# Patient Record
Sex: MALE | Race: BLACK OR AFRICAN AMERICAN | Employment: OTHER | URBAN - METROPOLITAN AREA
[De-identification: names, ages, dates, MRNs, and addresses within clinical notes are randomized per-mention and may not be internally consistent; named-entity substitution may affect disease eponyms.]

---

## 2021-03-04 ENCOUNTER — OFFICE VISIT (OUTPATIENT)
Dept: FAMILY MEDICINE | Facility: CLINIC | Age: 56
End: 2021-03-04
Payer: COMMERCIAL

## 2021-03-04 VITALS
WEIGHT: 188 LBS | HEIGHT: 69 IN | TEMPERATURE: 98 F | OXYGEN SATURATION: 100 % | SYSTOLIC BLOOD PRESSURE: 134 MMHG | HEART RATE: 78 BPM | DIASTOLIC BLOOD PRESSURE: 72 MMHG | BODY MASS INDEX: 27.85 KG/M2

## 2021-03-04 DIAGNOSIS — Z86.0100 HISTORY OF COLONIC POLYPS: ICD-10-CM

## 2021-03-04 DIAGNOSIS — F17.200 NICOTINE DEPENDENCE, UNCOMPLICATED, UNSPECIFIED NICOTINE PRODUCT TYPE: Primary | ICD-10-CM

## 2021-03-04 DIAGNOSIS — N52.9 ERECTILE DYSFUNCTION, UNSPECIFIED ERECTILE DYSFUNCTION TYPE: ICD-10-CM

## 2021-03-04 DIAGNOSIS — Z12.11 SCREENING FOR COLON CANCER: ICD-10-CM

## 2021-03-04 PROCEDURE — 99203 OFFICE O/P NEW LOW 30 MIN: CPT | Performed by: FAMILY MEDICINE

## 2021-03-04 RX ORDER — VARENICLINE TARTRATE 1 MG/1
1 TABLET, FILM COATED ORAL 2 TIMES DAILY
Qty: 60 TABLET | Refills: 1 | Status: SHIPPED | OUTPATIENT
Start: 2021-04-03

## 2021-03-04 RX ORDER — SILDENAFIL CITRATE 20 MG/1
TABLET ORAL
Qty: 60 TABLET | Refills: 0 | Status: SHIPPED | OUTPATIENT
Start: 2021-03-04

## 2021-03-04 RX ORDER — VARENICLINE TARTRATE 1 MG/1
1 TABLET, FILM COATED ORAL 2 TIMES DAILY
Qty: 60 TABLET | Refills: 1 | Status: SHIPPED | OUTPATIENT
Start: 2021-04-03 | End: 2021-03-04

## 2021-03-04 SDOH — HEALTH STABILITY: MENTAL HEALTH: HOW MANY STANDARD DRINKS CONTAINING ALCOHOL DO YOU HAVE ON A TYPICAL DAY?: NOT ASKED

## 2021-03-04 SDOH — HEALTH STABILITY: MENTAL HEALTH: HOW OFTEN DO YOU HAVE A DRINK CONTAINING ALCOHOL?: 2-4 TIMES A MONTH

## 2021-03-04 SDOH — HEALTH STABILITY: MENTAL HEALTH: HOW OFTEN DO YOU HAVE 6 OR MORE DRINKS ON ONE OCCASION?: NOT ASKED

## 2021-03-04 ASSESSMENT — MIFFLIN-ST. JEOR: SCORE: 1673.14

## 2021-03-04 NOTE — PROGRESS NOTES
"    Assessment & Plan     Nicotine dependence, uncomplicated, unspecified nicotine product type  - varenicline (CHANTIX CONTINUING MONTH CHRISSY) 1 MG tablet; Take 1 tablet (1 mg) by mouth 2 times daily    Erectile dysfunction, unspecified erectile dysfunction type  - sildenafil (REVATIO) 20 MG tablet; Take 2-5 tablets ( mg) 30 minutes to 4 hours before anticipated need for erectile dysfunction.    Screening for colon cancer  - GASTROENTEROLOGY ADULT REF PROCEDURE ONLY; Future    History of colonic polyps  - GASTROENTEROLOGY ADULT REF PROCEDURE ONLY; Future       BMI:   Estimated body mass index is 27.76 kg/m  as calculated from the following:    Height as of this encounter: 1.753 m (5' 9\").    Weight as of this encounter: 85.3 kg (188 lb).     No follow-ups on file.    Ham Murphy [facility] PRIOR OTILIO Charlton is a 55 year old who presents for the following health issues     HPI       New Patient/Transfer of Care - insurance change    He had preventive visit completed in November 2020:   - PSA normal, fasting glucose 102, lipids with mildly low HDL, and negative hepatitis c test.    Smoking cessation: prescribed Chantix for smoking cessation    Erectile dysfunction: prescribed sildenafil.    He was referred for colonoscopy and had it scheduled but insurance would not cover at that location.      Review of Systems   Constitutional, HEENT, cardiovascular, pulmonary, gi and gu systems are negative, except as otherwise noted.      Objective    /72   Pulse 78   Temp 98  F (36.7  C) (Tympanic)   Ht 1.753 m (5' 9\")   Wt 85.3 kg (188 lb)   SpO2 100%   BMI 27.76 kg/m    Body mass index is 27.76 kg/m .  Physical Exam   GENERAL: healthy, alert and no distress  RESP: lungs clear to auscultation - no rales, rhonchi or wheezes  CV: regular rate and rhythm, normal S1 S2, no S3 or S4, no murmur, click or rub, no peripheral edema and peripheral pulses strong  MS: no gross " musculoskeletal defects noted, no edema  PSYCH: mentation appears normal, affect normal/bright    Results from CareEverywhere reviewed.

## 2021-03-23 DIAGNOSIS — Z11.59 ENCOUNTER FOR SCREENING FOR OTHER VIRAL DISEASES: ICD-10-CM

## 2021-04-03 ENCOUNTER — HOSPITAL ENCOUNTER (OUTPATIENT)
Dept: LAB | Facility: CLINIC | Age: 56
Discharge: HOME | End: 2021-04-03
Attending: INTERNAL MEDICINE | Admitting: INTERNAL MEDICINE
Payer: COMMERCIAL

## 2021-04-03 DIAGNOSIS — Z11.59 ENCOUNTER FOR SCREENING FOR OTHER VIRAL DISEASES: ICD-10-CM

## 2021-04-03 LAB
SARS-COV-2 RNA RESP QL NAA+PROBE: NORMAL
SPECIMEN SOURCE: NORMAL

## 2021-04-03 PROCEDURE — U0005 INFEC AGEN DETEC AMPLI PROBE: HCPCS | Performed by: INTERNAL MEDICINE

## 2021-04-03 PROCEDURE — U0003 INFECTIOUS AGENT DETECTION BY NUCLEIC ACID (DNA OR RNA); SEVERE ACUTE RESPIRATORY SYNDROME CORONAVIRUS 2 (SARS-COV-2) (CORONAVIRUS DISEASE [COVID-19]), AMPLIFIED PROBE TECHNIQUE, MAKING USE OF HIGH THROUGHPUT TECHNOLOGIES AS DESCRIBED BY CMS-2020-01-R: HCPCS | Performed by: INTERNAL MEDICINE

## 2021-04-04 LAB
LABORATORY COMMENT REPORT: NORMAL
SARS-COV-2 RNA RESP QL NAA+PROBE: NEGATIVE
SPECIMEN SOURCE: NORMAL

## 2021-04-06 ENCOUNTER — HOSPITAL ENCOUNTER (OUTPATIENT)
Facility: CLINIC | Age: 56
Discharge: HOME | End: 2021-04-06
Attending: INTERNAL MEDICINE | Admitting: INTERNAL MEDICINE
Payer: COMMERCIAL

## 2021-04-06 VITALS
HEIGHT: 68 IN | HEART RATE: 77 BPM | DIASTOLIC BLOOD PRESSURE: 70 MMHG | OXYGEN SATURATION: 96 % | RESPIRATION RATE: 16 BRPM | WEIGHT: 185 LBS | BODY MASS INDEX: 28.04 KG/M2 | SYSTOLIC BLOOD PRESSURE: 125 MMHG

## 2021-04-06 LAB — COLONOSCOPY: NORMAL

## 2021-04-06 PROCEDURE — 45378 DIAGNOSTIC COLONOSCOPY: CPT | Performed by: INTERNAL MEDICINE

## 2021-04-06 PROCEDURE — G0500 MOD SEDAT ENDO SERVICE >5YRS: HCPCS | Performed by: INTERNAL MEDICINE

## 2021-04-06 PROCEDURE — G0105 COLORECTAL SCRN; HI RISK IND: HCPCS | Performed by: INTERNAL MEDICINE

## 2021-04-06 PROCEDURE — 250N000011 HC RX IP 250 OP 636: Performed by: INTERNAL MEDICINE

## 2021-04-06 RX ORDER — FLUMAZENIL 0.1 MG/ML
0.2 INJECTION, SOLUTION INTRAVENOUS
Status: DISCONTINUED | OUTPATIENT
Start: 2021-04-06 | End: 2021-04-06 | Stop reason: HOSPADM

## 2021-04-06 RX ORDER — NALOXONE HYDROCHLORIDE 0.4 MG/ML
0.4 INJECTION, SOLUTION INTRAMUSCULAR; INTRAVENOUS; SUBCUTANEOUS
Status: DISCONTINUED | OUTPATIENT
Start: 2021-04-06 | End: 2021-04-06 | Stop reason: HOSPADM

## 2021-04-06 RX ORDER — NALOXONE HYDROCHLORIDE 0.4 MG/ML
0.2 INJECTION, SOLUTION INTRAMUSCULAR; INTRAVENOUS; SUBCUTANEOUS
Status: DISCONTINUED | OUTPATIENT
Start: 2021-04-06 | End: 2021-04-06 | Stop reason: HOSPADM

## 2021-04-06 RX ORDER — ONDANSETRON 2 MG/ML
4 INJECTION INTRAMUSCULAR; INTRAVENOUS
Status: DISCONTINUED | OUTPATIENT
Start: 2021-04-06 | End: 2021-04-06 | Stop reason: HOSPADM

## 2021-04-06 RX ORDER — ONDANSETRON 4 MG/1
4 TABLET, ORALLY DISINTEGRATING ORAL EVERY 6 HOURS PRN
Status: DISCONTINUED | OUTPATIENT
Start: 2021-04-06 | End: 2021-04-06 | Stop reason: HOSPADM

## 2021-04-06 RX ORDER — FENTANYL CITRATE 50 UG/ML
INJECTION, SOLUTION INTRAMUSCULAR; INTRAVENOUS PRN
Status: COMPLETED | OUTPATIENT
Start: 2021-04-06 | End: 2021-04-06

## 2021-04-06 RX ORDER — LIDOCAINE 40 MG/G
CREAM TOPICAL
Status: DISCONTINUED | OUTPATIENT
Start: 2021-04-06 | End: 2021-04-06 | Stop reason: HOSPADM

## 2021-04-06 RX ORDER — ONDANSETRON 2 MG/ML
4 INJECTION INTRAMUSCULAR; INTRAVENOUS EVERY 6 HOURS PRN
Status: DISCONTINUED | OUTPATIENT
Start: 2021-04-06 | End: 2021-04-06 | Stop reason: HOSPADM

## 2021-04-06 RX ORDER — PROCHLORPERAZINE MALEATE 10 MG
10 TABLET ORAL EVERY 6 HOURS PRN
Status: DISCONTINUED | OUTPATIENT
Start: 2021-04-06 | End: 2021-04-06 | Stop reason: HOSPADM

## 2021-04-06 RX ADMIN — FENTANYL CITRATE 100 MCG: 50 INJECTION, SOLUTION INTRAMUSCULAR; INTRAVENOUS at 10:55

## 2021-04-06 RX ADMIN — MIDAZOLAM 2 MG: 1 INJECTION INTRAMUSCULAR; INTRAVENOUS at 10:55

## 2021-04-06 ASSESSMENT — MIFFLIN-ST. JEOR: SCORE: 1643.65

## 2021-04-06 NOTE — H&P
Pre-Endoscopy History and Physical     Zoltan Dalton MRN# [id number]   YOB: 1965 Age: 55 year old     Date of Procedure: 2021  Primary care provider: Ham Murphy  Type of Endoscopy: Colonoscopy with possible biopsy, possible polypectomy  Reason for Procedure: screen  Type of Anesthesia Anticipated: Conscious Sedation    HPI:    Zoltan is a 55 year old male who will be undergoing the above procedure.      A history and physical has been performed. The patient's medications and allergies have been reviewed. The risks and benefits of the procedure and the sedation options and risks were discussed with the patient.  All questions were answered and informed consent was obtained.      He denies a personal or family history of anesthesia complications or bleeding disorders.     Patient Active Problem List   Diagnosis     Erectile dysfunction     Tobacco abuse        History reviewed. No pertinent past medical history.     Past Surgical History:   Procedure Laterality Date     COLONOSCOPY         Social History     Tobacco Use     Smoking status: Former Smoker     Packs/day: 1.00     Years: 38.00     Pack years: 38.00     Types: Cigarettes     Start date:      Quit date: 2020     Years since quittin.2     Smokeless tobacco: Never Used   Substance Use Topics     Alcohol use: Yes     Frequency: 2-4 times a month     Comment: 6 per week       Family History   Problem Relation Age of Onset     Colon Cancer No family hx of        Prior to Admission medications    Medication Sig Start Date End Date Taking? Authorizing Provider   sildenafil (REVATIO) 20 MG tablet Take 2-5 tablets ( mg) 30 minutes to 4 hours before anticipated need for erectile dysfunction. 3/4/21  Yes Ham Murphy DO   varenicline (CHANTIX CONTINUING MONTH CHRISSY) 1 MG tablet Take 1 tablet (1 mg) by mouth 2 times daily 4/3/21  Yes Ham Murphy, DO       No Known Allergies     REVIEW OF SYSTEMS:   5 point ROS  "negative except as noted above in HPI, including Gen., Resp., CV, GI &  system review.    PHYSICAL EXAM:   /75   Pulse 82   Resp 12   Ht 1.727 m (5' 8\")   Wt 83.9 kg (185 lb)   SpO2 98%   BMI 28.13 kg/m   Estimated body mass index is 28.13 kg/m  as calculated from the following:    Height as of this encounter: 1.727 m (5' 8\").    Weight as of this encounter: 83.9 kg (185 lb).   GENERAL APPEARANCE: alert, and oriented  MENTAL STATUS: alert  AIRWAY EXAM: Mallampatti Class I (visualization of the soft palate, fauces, uvula, anterior and posterior pillars)  RESP: lungs clear to auscultation - no rales, rhonchi or wheezes  CV: regular rates and rhythm  DIAGNOSTICS:    Not indicated    IMPRESSION   ASA Class 2 - Mild systemic disease    PLAN:   Plan for Colonoscopy with possible biopsy, possible polypectomy. We discussed the risks, benefits and alternatives and the patient wished to proceed.    The above has been forwarded to the consulting provider.      Signed Electronically by: Ambrose Vargas MD  April 6, 2021          "

## 2021-04-06 NOTE — LETTER
March 22, 2021      Zoltan Dalton  [address] [address]        Dear Zoltan,      Please be aware that coverage of these services is subject to the terms and limitations of your health insurance plan.  Call member services at your health plan with any benefit or coverage questions.    Thank you for choosing [facility]. You are scheduled for the following service(s):    Date:  4/6/2021             Procedure:  COLONOSCOPY  Doctor:           Arrival Time:  10:30 am *Enter and check in at the Main Hospital Entrance*  Procedure Time:  11:00 am      Location:   [facility]        Endoscopy Department, First Floor         [address] [place], [place] [address]      [phone] or [phone] ([facility]) to reschedule        MIRALAX -GATORADE  PREP  Colonoscopy is the most accurate test to detect colon polyps and colon cancer; and the only test where polyps can be removed. During this procedure, a doctor examines the lining of your large intestine and rectum through a flexible tube.   Transportation  You must arrange for a ride for the day of your procedure with a responsible adult. A taxi , Uber, etc, is not an option unless you are accompanied by a responsible adult. If you fail to arrange transportation with a responsible adult, your procedure will be cancelled and rescheduled.    Purchase the  following supplies at your local pharmacy:  - 2 (two) bisacodyl tablets: each tablet contains 5 mg.  (Dulcolax  laxative NOT Dulcolax  stool softener)   - 1 (one) 8.3 oz bottle of Polyethylene Glycol (PEG) 3350 Powder   (MiraLAX , Smooth LAX , ClearLAX  or equivalent)  - 64 oz Gatorade    Regular Gatorade, Gatorade G2 , Powerade , Powerade Zero  or Pedialyte  is acceptable. Red colored flavors are not allowed; all other colors (yellow, green, orange, purple and blue) are okay. It is also okay to buy two 2.12 oz packets of powdered Gatorade that  can be mixed with water to a total volume of 64 oz of liquid.  - 1 (one) 10 oz bottle of Magnesium Citrate (Red colored flavors are not allowed)  It is also okay for you to use a 0.5 oz package of powdered magnesium citrate (17 g) mixed with 10 oz of water.      PREPARATION FOR COLONOSCOPY    7 days before:    Discontinue fiber supplements and medications containing iron. This includes Metamucil  and Fibercon ; and multivitamins with iron.    3 days before:    Begin a low-fiber diet. A low-fiber diet helps making the cleanout more effective.     Examples of a low-fiber diet include (but are not limited to): white bread, white rice, pasta, crackers, fish, chicken, eggs, ground beef, creamy peanut butter, cooked/steamed/boiled vegetables, canned fruit, bananas, melons, milk, plain yogurt cheese, salad dressing and other condiments.     The following are not allowed on a low-fiber diet: seeds, nuts, popcorn, bran, whole wheat, corn, quinoa, raw fruits and vegetables, berries and dried fruit, beans and lentils.    For additional details on low-fiber diet, please refer to the table on the last page.    2 days before:    Continue the low-fiber diet.     Drink at least 8 glasses of water throughout the day.     Stop eating solid foods at 11:45 pm.    1 day before:    In the morning: begin a clear liquid diet (liquids you can see through).     Examples of a clear liquid diet include: water, clear broth or bouillon, Gatorade, Pedialyte or Powerade, carbonated and non-carbonated soft drinks (Sprite , 7-Up , ginger ale), strained fruit juices without pulp (apple, white grape, white cranberry), Jell-O  and popsicles.     The following are not allowed on a clear liquid diet: red liquids, alcoholic beverages, dairy products (milk, creamer, and yogurt), protein shakes, creamy broths, juice with pulp and chewing tobacco.    At noon: take 2 (two) bisacodyl tablets     At 4 (and no later than 6pm): start drinking the Miralax-Gatorade  preparation (8.3 oz of Miralax mixed with 64 oz of Gatorade in a large pitcher). Drink 1(one) 8 oz glass every 15 minutes thereafter, until the mixture is gone.    COLON CLEANSING TIPS: drink adequate amounts of fluids before and after your colon cleansing to prevent dehydration. Stay near a toilet because you will have diarrhea. Even if you are sitting on the toilet, continue to drink the cleansing solution every 15 minutes. If you feel nauseous or vomit, rinse your mouth with water, take a 15 to 30-minute-break and then continue drinking the solution. You will be uncomfortable until the stool has flushed from your colon (in about 2 to 4 hours). You may feel chilled.    Day of your procedure  You may take all of your morning medications including blood pressure medications, blood thinners (if you have not been instructed to stop these by our office), methadone, anti-seizure medications with sips of water 3 hours prior to your procedure or earlier. Do not take insulin or vitamins prior to your procedure. Continue the clear liquid diet.       4 hours prior: drink 10 oz of magnesium citrate. It may be easier to drink it with a straw.    STOP consuming all liquids after that.     Do not take anything by mouth during this time.     Allow extra time to travel to your procedure as you may need to stop and use a restroom along the way.    You are ready for the procedure, if you followed all instructions and your stool is no longer formed, but clear or yellow liquid. If you are unsure whether your colon is clean, please call our office at [phone] before you leave for your appointment.    Bring the following to your procedure:  - Insurance Card/Photo ID.   - List of current medications including over-the-counter medications and supplements.   - Your rescue inhaler if you currently use one to control asthma.    Canceling or rescheduling your appointment:   If you must cancel or reschedule your appointment, please call  [phone] as soon as possible.      COLONOSCOPY PRE-PROCEDURE CHECKLIST    If you have diabetes, ask your regular doctor for diet and medication restrictions.  If you take an anticoagulant or anti-platelet medication (such as Coumadin , Lovenox , Pradaxa , Xarelto , Eliquis , etc.), please call your primary doctor for advice on holding this medication.  If you take aspirin you may continue to do so.  If you are or may be pregnant, please discuss the risks and benefits of this procedure with your doctor.        What happens during a colonoscopy?    Plan to spend up to two hours, starting at registration time, at the endoscopy center the day of your procedure. The colonoscopy takes an average of 15 to 30 minutes. Recovery time is about 30 minutes.      Before the exam:    You will change into a gown.    Your medical history and medication list will be reviewed with you, unless that has been done over the phone prior to the procedure.     A nurse will insert an intravenous (IV) line into your hand or arm.    The doctor will meet with you and will give you a consent form to sign.  During the exam:     Medicine will be given through the IV line to help you relax.     Your heart rate and oxygen levels will be monitored. If your blood pressure is low, you may be given fluids through the IV line.     The doctor will insert a flexible hollow tube, called a colonoscope, into your rectum. The scope will be advanced slowly through the large intestine (colon).    You may have a feeling of fullness or pressure.     If an abnormal tissue or a polyp is found, the doctor may remove it through the endoscope for closer examination, or biopsy. Tissue removal is painless    After the exam:           Any tissue samples removed during the exam will be sent to a lab for evaluation. It may take 5-7 working days for you to be notified of the results.     A nurse will provide you with complete discharge instructions before you leave the  endoscopy center. Be sure to ask the nurse for specific instructions if you take blood thinners such as Aspirin, Coumadin or Plavix.     The doctor will prepare a full report for you and for the physician who referred you for the procedure.     Your doctor will talk with you about the initial results of your exam.      Medication given during the exam will prohibit you from driving for the rest of the day.     Following the exam, you may resume your normal diet. Your first meal should be light, no greasy foods. Avoid alcohol until the next day.     You may resume your regular activities the day after the procedure.         LOW-FIBER DIET    Foods RECOMMENDED Foods to AVOID   Breads, Cereal, Rice and Pasta:   White bread, rolls, biscuits, croissant and fiordaliza toast.   Waffles, Swedish toast and pancakes.   White rice, noodles, pasta, macaroni and peeled cooked potatoes.   Plain crackers and saltines.   Cooked cereals: farina, cream of rice.   Cold cereals: Puffed Rice , Rice Krispies , Corn Flakes  and Special K    Breads, Cereal, Rice and Pasta:   Breads or rolls with nuts, seeds or fruit.   Whole wheat, pumpernickel, rye breads and cornbread.   Potatoes with skin, brown or wild rice, and kasha (buckwheat).     Vegetables:   Tender cooked and canned vegetables without seeds: carrots, asparagus tips, green or wax beans, pumpkin, spinach, lima beans. Vegetables:   Raw or steamed vegetables.   Vegetables with seeds.   Sauerkraut.   Winter squash, peas, broccoli, Brussel sprouts, cabbage, onions, cauliflower, baked beans, peas and corn.   Fruits:   Strained fruit juice.   Canned fruit, except pineapple.   Ripe bananas and melon. Fruits:   Prunes and prune juice.   Raw fruits.   Dried fruits: figs, dates and raisins.   Milk/Dairy:   Milk: plain or flavored.   Yogurt, custard and ice cream.   Cheese and cottage cheese Milk/Dairy:     Meat and other proteins:   ground, well-cooked tender beef, lamb, ham, veal, pork, fish,  poultry and organ meats.   Eggs.   Peanut butter without nuts. Meat and other proteins:   Tough, fibrous meats with gristle.   Dry beans, peas and lentils.   Peanut butter with nuts.   Tofu.   Fats, Snack, Sweets, Condiments and Beverages:   Margarine, butter, oils, mayonnaise, sour cream and salad dressing, plain gravy.   Sugar, hard candy, clear jelly, honey and syrup.   Spices, cooked herbs, bouillon, broth and soups made with allowed vegetable, ketchup and mustard.   Coffee, tea and carbonated drinks.   Plain cakes, cookies and pretzels.   Gelatin, plain puddings, custard, ice cream, sherbet and popsicles. Fats, Snack, Sweets, Condiments and Beverages:   Nuts, seeds and coconut.   Jam, marmalade and preserves.   Pickles, olives, relish and horseradish.   All desserts containing nuts, seeds, dried fruit and coconut; or made from whole grains or bran.   Candy made with nuts or seeds.   Popcorn.         DIRECTIONS TO THE ENDOSCOPY DEPARTMENT    From the north ([facility])  Take [address] South, exit on [address] [address]. Get into the left hand hawa, turn left (east), go one-half mile to [address] and turn left. Go north to the second stoplight, take a right on [address] and follow it to the Main Hospital entrance.    From the south ([facility])  Take 35N to the [address]E split and exit on [address] [address]. On [address] [address], turn left (west) to [address]. Turn right (north) on [address]. Go north to the second stoplight, take a right on [address] and follow it to the Main Hospital entrance.    From the east via 35E ([facility])  Take [address] south to [address] [address] exit. Turn right on [address] [address]. Go west to [address]. Turn right (north) on [address]. Go to the second stoplight, take a right on [address] to the Main Hospital entrance.    From the east via [address] ([facility])  Take [address] to [address]. Turn left  (south) on [address] to [address], turn left (east) on [address] and follow it to the Main Hospital entrance.    From the west via [address] ([place], [place])  Take [address] east to [address]. Turn right (south) on [address] to [address], turn left (east) on [address] and follow it to the Main Hospital entrance.

## 2025-01-01 ENCOUNTER — APPOINTMENT (OUTPATIENT)
Dept: ULTRASOUND IMAGING | Facility: CLINIC | Age: 60
DRG: 420 | End: 2025-01-01
Attending: STUDENT IN AN ORGANIZED HEALTH CARE EDUCATION/TRAINING PROGRAM

## 2025-01-01 ENCOUNTER — APPOINTMENT (OUTPATIENT)
Dept: ULTRASOUND IMAGING | Facility: CLINIC | Age: 60
DRG: 420 | End: 2025-01-01

## 2025-01-01 ENCOUNTER — APPOINTMENT (OUTPATIENT)
Dept: ULTRASOUND IMAGING | Facility: CLINIC | Age: 60
DRG: 420 | End: 2025-01-01
Attending: EMERGENCY MEDICINE

## 2025-01-01 ENCOUNTER — APPOINTMENT (OUTPATIENT)
Dept: CT IMAGING | Facility: CLINIC | Age: 60
End: 2025-01-01
Attending: STUDENT IN AN ORGANIZED HEALTH CARE EDUCATION/TRAINING PROGRAM

## 2025-01-01 ENCOUNTER — APPOINTMENT (OUTPATIENT)
Dept: PHYSICAL THERAPY | Facility: CLINIC | Age: 60
DRG: 420 | End: 2025-01-01
Attending: STUDENT IN AN ORGANIZED HEALTH CARE EDUCATION/TRAINING PROGRAM

## 2025-01-01 ENCOUNTER — APPOINTMENT (OUTPATIENT)
Dept: CT IMAGING | Facility: CLINIC | Age: 60
DRG: 420 | End: 2025-01-01
Attending: EMERGENCY MEDICINE

## 2025-01-01 ENCOUNTER — DOCUMENTATION ONLY (OUTPATIENT)
Dept: OTHER | Facility: CLINIC | Age: 60
End: 2025-01-01

## 2025-01-01 ENCOUNTER — APPOINTMENT (OUTPATIENT)
Dept: MRI IMAGING | Facility: CLINIC | Age: 60
DRG: 420 | End: 2025-01-01

## 2025-01-01 ENCOUNTER — APPOINTMENT (OUTPATIENT)
Dept: GENERAL RADIOLOGY | Facility: CLINIC | Age: 60
DRG: 420 | End: 2025-01-01
Attending: STUDENT IN AN ORGANIZED HEALTH CARE EDUCATION/TRAINING PROGRAM

## 2025-01-01 PROCEDURE — 74183 MRI ABD W/O CNTR FLWD CNTR: CPT

## 2025-01-01 PROCEDURE — 76705 ECHO EXAM OF ABDOMEN: CPT

## 2025-01-01 PROCEDURE — 71250 CT THORAX DX C-: CPT

## 2025-01-01 PROCEDURE — 49083 ABD PARACENTESIS W/IMAGING: CPT | Mod: 52

## 2025-01-01 PROCEDURE — 93971 EXTREMITY STUDY: CPT | Mod: LT

## 2025-01-01 PROCEDURE — 74330 X-RAY BILE/PANC ENDOSCOPY: CPT

## 2025-06-24 ENCOUNTER — HOSPITAL ENCOUNTER (INPATIENT)
Facility: CLINIC | Age: 60
End: 2025-06-24
Attending: EMERGENCY MEDICINE | Admitting: STUDENT IN AN ORGANIZED HEALTH CARE EDUCATION/TRAINING PROGRAM

## 2025-06-24 DIAGNOSIS — R74.01 TRANSAMINITIS: ICD-10-CM

## 2025-06-24 DIAGNOSIS — R53.83 OTHER FATIGUE: ICD-10-CM

## 2025-06-24 DIAGNOSIS — K63.89 COLONIC MASS: ICD-10-CM

## 2025-06-24 DIAGNOSIS — R91.8 PULMONARY NODULES: ICD-10-CM

## 2025-06-24 DIAGNOSIS — R16.0 LIVER MASS: ICD-10-CM

## 2025-06-24 DIAGNOSIS — R63.4 UNEXPLAINED WEIGHT LOSS: ICD-10-CM

## 2025-06-24 DIAGNOSIS — R17 ELEVATED BILIRUBIN: ICD-10-CM

## 2025-06-24 DIAGNOSIS — N17.9 AKI (ACUTE KIDNEY INJURY): ICD-10-CM

## 2025-06-24 LAB
AFP SERPL-MCNC: <1.8 NG/ML
ALBUMIN SERPL BCG-MCNC: 3 G/DL (ref 3.5–5.2)
ALBUMIN UR-MCNC: 10 MG/DL
ALP SERPL-CCNC: 489 U/L (ref 40–150)
ALT SERPL W P-5'-P-CCNC: 140 U/L (ref 0–70)
ANION GAP SERPL CALCULATED.3IONS-SCNC: 13 MMOL/L (ref 7–15)
APPEARANCE UR: ABNORMAL
APTT PPP: 26 SECONDS (ref 22–38)
AST SERPL W P-5'-P-CCNC: 135 U/L (ref 0–45)
BASOPHILS # BLD AUTO: 0 10E3/UL (ref 0–0.2)
BASOPHILS NFR BLD AUTO: 0 %
BILIRUB SERPL-MCNC: 33.4 MG/DL
BILIRUB UR QL STRIP: ABNORMAL
BUN SERPL-MCNC: 52.3 MG/DL (ref 8–23)
CALCIUM SERPL-MCNC: 12 MG/DL (ref 8.8–10.4)
CEA SERPL-MCNC: 43.1 NG/ML
CELLULAR CAST: 43 /LPF (ref ?–1)
CHLORIDE SERPL-SCNC: 97 MMOL/L (ref 98–107)
CK SERPL-CCNC: 474 U/L (ref 39–308)
COLOR UR AUTO: ABNORMAL
CREAT SERPL-MCNC: 2.24 MG/DL (ref 0.67–1.17)
EGFRCR SERPLBLD CKD-EPI 2021: 33 ML/MIN/1.73M2
EOSINOPHIL # BLD AUTO: 0 10E3/UL (ref 0–0.7)
EOSINOPHIL NFR BLD AUTO: 0 %
ERYTHROCYTE [DISTWIDTH] IN BLOOD BY AUTOMATED COUNT: 23.4 % (ref 10–15)
EST. AVERAGE GLUCOSE BLD GHB EST-MCNC: 77 MG/DL
FLUAV RNA SPEC QL NAA+PROBE: NEGATIVE
FLUBV RNA RESP QL NAA+PROBE: NEGATIVE
GLUCOSE SERPL-MCNC: 107 MG/DL (ref 70–99)
GLUCOSE UR STRIP-MCNC: NEGATIVE MG/DL
GRANULAR CAST: 45 /LPF (ref ?–1)
HBA1C MFR BLD: 4.3 %
HCO3 SERPL-SCNC: 24 MMOL/L (ref 22–29)
HCT VFR BLD AUTO: 30.7 % (ref 40–53)
HGB BLD-MCNC: 11.3 G/DL (ref 13.3–17.7)
HGB UR QL STRIP: ABNORMAL
HOLD SPECIMEN: NORMAL
HYALINE CASTS: 68 /LPF
IMM GRANULOCYTES # BLD: 0.2 10E3/UL
IMM GRANULOCYTES NFR BLD: 1 %
INR PPP: 1.09 (ref 0.85–1.15)
KETONES UR STRIP-MCNC: ABNORMAL MG/DL
LACTATE SERPL-SCNC: 1 MMOL/L (ref 0.7–2)
LEUKOCYTE ESTERASE UR QL STRIP: NEGATIVE
LIPASE SERPL-CCNC: 31 U/L (ref 13–60)
LYMPHOCYTES # BLD AUTO: 1.5 10E3/UL (ref 0.8–5.3)
LYMPHOCYTES NFR BLD AUTO: 10 %
MCH RBC QN AUTO: 33.5 PG (ref 26.5–33)
MCHC RBC AUTO-ENTMCNC: 36.8 G/DL (ref 31.5–36.5)
MCV RBC AUTO: 91 FL (ref 78–100)
MONOCYTES # BLD AUTO: 1.1 10E3/UL (ref 0–1.3)
MONOCYTES NFR BLD AUTO: 8 %
MUCOUS THREADS #/AREA URNS LPF: PRESENT /LPF
NEUTROPHILS # BLD AUTO: 11.7 10E3/UL (ref 1.6–8.3)
NEUTROPHILS NFR BLD AUTO: 81 %
NITRATE UR QL: NEGATIVE
NRBC # BLD AUTO: 0 10E3/UL
NRBC BLD AUTO-RTO: 0 /100
PH UR STRIP: 5.5 [PH] (ref 5–7)
PLATELET # BLD AUTO: 277 10E3/UL (ref 150–450)
POTASSIUM SERPL-SCNC: 3.6 MMOL/L (ref 3.4–5.3)
PROT SERPL-MCNC: 5.9 G/DL (ref 6.4–8.3)
PROTHROMBIN TIME: 14.2 SECONDS (ref 11.8–14.8)
RBC # BLD AUTO: 3.37 10E6/UL (ref 4.4–5.9)
RBC URINE: 3 /HPF
RSV RNA SPEC NAA+PROBE: NEGATIVE
SARS-COV-2 RNA RESP QL NAA+PROBE: NEGATIVE
SODIUM SERPL-SCNC: 134 MMOL/L (ref 135–145)
SP GR UR STRIP: 1.01 (ref 1–1.03)
SQUAMOUS EPITHELIAL: 5 /HPF
TSH SERPL DL<=0.005 MIU/L-ACNC: NORMAL M[IU]/L
UROBILINOGEN UR STRIP-MCNC: NORMAL MG/DL
WBC # BLD AUTO: 14.5 10E3/UL (ref 4–11)
WBC URINE: 17 /HPF

## 2025-06-24 PROCEDURE — 85610 PROTHROMBIN TIME: CPT | Performed by: NURSE PRACTITIONER

## 2025-06-24 PROCEDURE — 82105 ALPHA-FETOPROTEIN SERUM: CPT | Performed by: NURSE PRACTITIONER

## 2025-06-24 PROCEDURE — 258N000003 HC RX IP 258 OP 636

## 2025-06-24 PROCEDURE — 87637 SARSCOV2&INF A&B&RSV AMP PRB: CPT | Performed by: NURSE PRACTITIONER

## 2025-06-24 PROCEDURE — 85025 COMPLETE CBC W/AUTO DIFF WBC: CPT | Performed by: EMERGENCY MEDICINE

## 2025-06-24 PROCEDURE — 36415 COLL VENOUS BLD VENIPUNCTURE: CPT | Performed by: STUDENT IN AN ORGANIZED HEALTH CARE EDUCATION/TRAINING PROGRAM

## 2025-06-24 PROCEDURE — 82040 ASSAY OF SERUM ALBUMIN: CPT | Performed by: EMERGENCY MEDICINE

## 2025-06-24 PROCEDURE — 81001 URINALYSIS AUTO W/SCOPE: CPT | Performed by: EMERGENCY MEDICINE

## 2025-06-24 PROCEDURE — 99223 1ST HOSP IP/OBS HIGH 75: CPT | Performed by: NURSE PRACTITIONER

## 2025-06-24 PROCEDURE — 36415 COLL VENOUS BLD VENIPUNCTURE: CPT

## 2025-06-24 PROCEDURE — 36415 COLL VENOUS BLD VENIPUNCTURE: CPT | Performed by: NURSE PRACTITIONER

## 2025-06-24 PROCEDURE — 85730 THROMBOPLASTIN TIME PARTIAL: CPT | Performed by: NURSE PRACTITIONER

## 2025-06-24 PROCEDURE — 250N000011 HC RX IP 250 OP 636

## 2025-06-24 PROCEDURE — 83690 ASSAY OF LIPASE: CPT | Performed by: EMERGENCY MEDICINE

## 2025-06-24 PROCEDURE — 258N000003 HC RX IP 258 OP 636: Performed by: EMERGENCY MEDICINE

## 2025-06-24 PROCEDURE — 258N000003 HC RX IP 258 OP 636: Performed by: NURSE PRACTITIONER

## 2025-06-24 PROCEDURE — 250N000013 HC RX MED GY IP 250 OP 250 PS 637

## 2025-06-24 PROCEDURE — 36415 COLL VENOUS BLD VENIPUNCTURE: CPT | Performed by: EMERGENCY MEDICINE

## 2025-06-24 PROCEDURE — 99418 PROLNG IP/OBS E/M EA 15 MIN: CPT | Performed by: NURSE PRACTITIONER

## 2025-06-24 PROCEDURE — 83605 ASSAY OF LACTIC ACID: CPT | Performed by: STUDENT IN AN ORGANIZED HEALTH CARE EDUCATION/TRAINING PROGRAM

## 2025-06-24 PROCEDURE — 99285 EMERGENCY DEPT VISIT HI MDM: CPT | Mod: 25

## 2025-06-24 PROCEDURE — 250N000013 HC RX MED GY IP 250 OP 250 PS 637: Performed by: NURSE PRACTITIONER

## 2025-06-24 PROCEDURE — 83036 HEMOGLOBIN GLYCOSYLATED A1C: CPT | Performed by: NURSE PRACTITIONER

## 2025-06-24 PROCEDURE — 84443 ASSAY THYROID STIM HORMONE: CPT | Performed by: NURSE PRACTITIONER

## 2025-06-24 PROCEDURE — 87086 URINE CULTURE/COLONY COUNT: CPT | Performed by: EMERGENCY MEDICINE

## 2025-06-24 PROCEDURE — 82378 CARCINOEMBRYONIC ANTIGEN: CPT | Performed by: NURSE PRACTITIONER

## 2025-06-24 PROCEDURE — 96360 HYDRATION IV INFUSION INIT: CPT

## 2025-06-24 PROCEDURE — 83735 ASSAY OF MAGNESIUM: CPT | Performed by: STUDENT IN AN ORGANIZED HEALTH CARE EDUCATION/TRAINING PROGRAM

## 2025-06-24 PROCEDURE — 86301 IMMUNOASSAY TUMOR CA 19-9: CPT | Performed by: NURSE PRACTITIONER

## 2025-06-24 PROCEDURE — 82550 ASSAY OF CK (CPK): CPT | Performed by: EMERGENCY MEDICINE

## 2025-06-24 PROCEDURE — 120N000001 HC R&B MED SURG/OB

## 2025-06-24 RX ORDER — PROCHLORPERAZINE MALEATE 5 MG/1
10 TABLET ORAL EVERY 6 HOURS PRN
Status: DISCONTINUED | OUTPATIENT
Start: 2025-06-24 | End: 2025-07-10 | Stop reason: HOSPADM

## 2025-06-24 RX ORDER — ONDANSETRON 2 MG/ML
4 INJECTION INTRAMUSCULAR; INTRAVENOUS EVERY 6 HOURS PRN
Status: DISCONTINUED | OUTPATIENT
Start: 2025-06-24 | End: 2025-07-10 | Stop reason: HOSPADM

## 2025-06-24 RX ORDER — AMOXICILLIN 250 MG
1 CAPSULE ORAL 2 TIMES DAILY
Status: DISCONTINUED | OUTPATIENT
Start: 2025-06-24 | End: 2025-07-10 | Stop reason: HOSPADM

## 2025-06-24 RX ORDER — ALBUMIN (HUMAN) 12.5 G/50ML
50 SOLUTION INTRAVENOUS ONCE
Status: DISCONTINUED | OUTPATIENT
Start: 2025-06-24 | End: 2025-06-29

## 2025-06-24 RX ORDER — HYDROMORPHONE HCL IN WATER/PF 6 MG/30 ML
0.4 PATIENT CONTROLLED ANALGESIA SYRINGE INTRAVENOUS
Status: DISCONTINUED | OUTPATIENT
Start: 2025-06-24 | End: 2025-07-10 | Stop reason: HOSPADM

## 2025-06-24 RX ORDER — HYDROMORPHONE HCL IN WATER/PF 6 MG/30 ML
0.2 PATIENT CONTROLLED ANALGESIA SYRINGE INTRAVENOUS
Status: DISCONTINUED | OUTPATIENT
Start: 2025-06-24 | End: 2025-07-10 | Stop reason: HOSPADM

## 2025-06-24 RX ORDER — HYDROMORPHONE HYDROCHLORIDE 2 MG/1
2 TABLET ORAL EVERY 4 HOURS PRN
Status: DISCONTINUED | OUTPATIENT
Start: 2025-06-24 | End: 2025-07-09

## 2025-06-24 RX ORDER — ALBUTEROL SULFATE 0.83 MG/ML
2.5 SOLUTION RESPIRATORY (INHALATION) EVERY 4 HOURS PRN
Status: DISCONTINUED | OUTPATIENT
Start: 2025-06-24 | End: 2025-07-10 | Stop reason: HOSPADM

## 2025-06-24 RX ORDER — SODIUM CHLORIDE, SODIUM LACTATE, POTASSIUM CHLORIDE, CALCIUM CHLORIDE 600; 310; 30; 20 MG/100ML; MG/100ML; MG/100ML; MG/100ML
INJECTION, SOLUTION INTRAVENOUS CONTINUOUS
Status: DISCONTINUED | OUTPATIENT
Start: 2025-06-24 | End: 2025-06-28

## 2025-06-24 RX ORDER — ACETAMINOPHEN 650 MG/1
650 SUPPOSITORY RECTAL EVERY 4 HOURS PRN
Status: DISCONTINUED | OUTPATIENT
Start: 2025-06-24 | End: 2025-07-10 | Stop reason: HOSPADM

## 2025-06-24 RX ORDER — POLYETHYLENE GLYCOL 3350 17 G/17G
17 POWDER, FOR SOLUTION ORAL 2 TIMES DAILY PRN
Status: DISCONTINUED | OUTPATIENT
Start: 2025-06-24 | End: 2025-07-10 | Stop reason: HOSPADM

## 2025-06-24 RX ORDER — LIDOCAINE 40 MG/G
CREAM TOPICAL
Status: DISCONTINUED | OUTPATIENT
Start: 2025-06-24 | End: 2025-07-10 | Stop reason: HOSPADM

## 2025-06-24 RX ORDER — CALCIUM CARBONATE 500 MG/1
1000 TABLET, CHEWABLE ORAL 4 TIMES DAILY PRN
Status: DISCONTINUED | OUTPATIENT
Start: 2025-06-24 | End: 2025-07-10 | Stop reason: HOSPADM

## 2025-06-24 RX ORDER — AMOXICILLIN 250 MG
2 CAPSULE ORAL 2 TIMES DAILY
Status: DISCONTINUED | OUTPATIENT
Start: 2025-06-24 | End: 2025-07-10 | Stop reason: HOSPADM

## 2025-06-24 RX ORDER — HYDRALAZINE HYDROCHLORIDE 20 MG/ML
10 INJECTION INTRAMUSCULAR; INTRAVENOUS EVERY 4 HOURS PRN
Status: DISCONTINUED | OUTPATIENT
Start: 2025-06-24 | End: 2025-07-09

## 2025-06-24 RX ORDER — ONDANSETRON 4 MG/1
4 TABLET, ORALLY DISINTEGRATING ORAL EVERY 6 HOURS PRN
Status: DISCONTINUED | OUTPATIENT
Start: 2025-06-24 | End: 2025-07-10 | Stop reason: HOSPADM

## 2025-06-24 RX ORDER — HYDRALAZINE HYDROCHLORIDE 10 MG/1
10 TABLET, FILM COATED ORAL EVERY 4 HOURS PRN
Status: DISCONTINUED | OUTPATIENT
Start: 2025-06-24 | End: 2025-07-09

## 2025-06-24 RX ORDER — LIDOCAINE 40 MG/G
CREAM TOPICAL
Status: DISCONTINUED | OUTPATIENT
Start: 2025-06-24 | End: 2025-06-26 | Stop reason: HOSPADM

## 2025-06-24 RX ORDER — ACETAMINOPHEN 325 MG/1
650 TABLET ORAL EVERY 4 HOURS PRN
Status: DISCONTINUED | OUTPATIENT
Start: 2025-06-24 | End: 2025-07-10 | Stop reason: HOSPADM

## 2025-06-24 RX ADMIN — SODIUM CHLORIDE, SODIUM LACTATE, POTASSIUM CHLORIDE, AND CALCIUM CHLORIDE: .6; .31; .03; .02 INJECTION, SOLUTION INTRAVENOUS at 23:45

## 2025-06-24 RX ADMIN — SODIUM CHLORIDE 1000 ML: 0.9 INJECTION, SOLUTION INTRAVENOUS at 08:34

## 2025-06-24 RX ADMIN — PHYTONADIONE 5 MG: 10 INJECTION, EMULSION INTRAMUSCULAR; INTRAVENOUS; SUBCUTANEOUS at 19:33

## 2025-06-24 RX ADMIN — SODIUM CHLORIDE, SODIUM LACTATE, POTASSIUM CHLORIDE, AND CALCIUM CHLORIDE: .6; .31; .03; .02 INJECTION, SOLUTION INTRAVENOUS at 14:50

## 2025-06-24 RX ADMIN — SENNOSIDES AND DOCUSATE SODIUM 2 TABLET: 50; 8.6 TABLET ORAL at 20:21

## 2025-06-24 RX ADMIN — POLYETHYLENE GLYCOL 3350, SODIUM SULFATE ANHYDROUS, SODIUM BICARBONATE, SODIUM CHLORIDE, POTASSIUM CHLORIDE 4000 ML: 236; 22.74; 6.74; 5.86; 2.97 POWDER, FOR SOLUTION ORAL at 16:47

## 2025-06-24 ASSESSMENT — ACTIVITIES OF DAILY LIVING (ADL)
ADLS_ACUITY_SCORE: 28
ADLS_ACUITY_SCORE: 41
ADLS_ACUITY_SCORE: 28
ADLS_ACUITY_SCORE: 41
WEAR_GLASSES_OR_BLIND: NO
EATING/SWALLOWING: SWALLOWING SOLID FOOD
DIFFICULTY_COMMUNICATING: NO
CONCENTRATING,_REMEMBERING_OR_MAKING_DECISIONS_DIFFICULTY: NO
WALKING_OR_CLIMBING_STAIRS_DIFFICULTY: YES
ADLS_ACUITY_SCORE: 41
ADLS_ACUITY_SCORE: 28
ADLS_ACUITY_SCORE: 28
DRESSING/BATHING_DIFFICULTY: NO
TOILETING_ISSUES: NO
FALL_HISTORY_WITHIN_LAST_SIX_MONTHS: NO
ADLS_ACUITY_SCORE: 41
ADLS_ACUITY_SCORE: 28
WALKING_OR_CLIMBING_STAIRS: AMBULATION DIFFICULTY, REQUIRES EQUIPMENT
ADLS_ACUITY_SCORE: 41
HEARING_DIFFICULTY_OR_DEAF: NO
DOING_ERRANDS_INDEPENDENTLY_DIFFICULTY: NO
ADLS_ACUITY_SCORE: 41
ADLS_ACUITY_SCORE: 46
ADLS_ACUITY_SCORE: 46
ADLS_ACUITY_SCORE: 28
CHANGE_IN_FUNCTIONAL_STATUS_SINCE_ONSET_OF_CURRENT_ILLNESS/INJURY: NO
ADLS_ACUITY_SCORE: 41
ADLS_ACUITY_SCORE: 41
DIFFICULTY_EATING/SWALLOWING: YES
ADLS_ACUITY_SCORE: 28

## 2025-06-24 ASSESSMENT — COLUMBIA-SUICIDE SEVERITY RATING SCALE - C-SSRS
6. HAVE YOU EVER DONE ANYTHING, STARTED TO DO ANYTHING, OR PREPARED TO DO ANYTHING TO END YOUR LIFE?: NO
1. IN THE PAST MONTH, HAVE YOU WISHED YOU WERE DEAD OR WISHED YOU COULD GO TO SLEEP AND NOT WAKE UP?: NO
2. HAVE YOU ACTUALLY HAD ANY THOUGHTS OF KILLING YOURSELF IN THE PAST MONTH?: NO

## 2025-06-24 NOTE — CONSULTS
GASTROENTEROLOGY CONSULTATION     Zoltan Dalton  [address] [facility] [address]  60 year old male    Admission Date/Time: 6/24/2025  Primary Care Provider: No Ref-Primary, Physician    We were asked to see the patient in consultation by Dr. Jane Rhodes for evaluation of elevated LFTs, likely liver mass, possible colon mass.      PAST MEDICAL HISTORY:  Patient Active Problem List    Diagnosis Date Noted    Transaminitis 06/24/2025     Priority: Medium    Pulmonary nodules 06/24/2025     Priority: Medium    Liver mass 06/24/2025     Priority: Medium    Unexplained weight loss 06/24/2025     Priority: Medium    PIYUSH (acute kidney injury) 06/24/2025     Priority: Medium    Colonic mass 06/24/2025     Priority: Medium    Elevated bilirubin 06/24/2025     Priority: Medium    Other fatigue 06/24/2025     Priority: Medium    Erectile dysfunction 11/19/2020     Priority: Medium    Tobacco abuse 08/08/2014     Priority: Medium       HPI:  Zoltan Dalton is a 60 year old male with a history of tobacco use who presented on 6/24 for abdominal pain and was admitted with PIYUSH, pulmonary nodules, elevated LFTs, weight loss, liver mass, and colonic mass.    He reports epigastric pain with eating for several months that improved within a few hours, as well as decreased appetite, fatigue, and dizziness. He also notes discolored urine.  He also reports a 40 pound weight loss over the past several months associated with his decreased appetite. He noted yellow discoloration of his skin and eyes about 1 month ago. Bowel movements have become less frequent in the last few months, occurring once every 3 days. He reports prolonged time spent passing the bowel movement, but they are not hard. No dark or bloody stools. No dysphagia, nausea, or vomiting. He does not take any medications or supplements at home. No alcohol use in the last 3 months, but was previously drinking a 6 pack of beer or less a week. No history of IV  "drug use. He does smoke marijuana every few months.     Labs on admission show Tbili 33.4, , , alk phos 489. INR 1.41. WBC 14. Hgb 11.3 with normal MCV. BUN 52.3 and Cr 2.24 consistent with PIYUSH. Normal lipase.    CTCAP w/o contrast on admission demonstrated likely malignancy within the liver, possible colonic mass with wall thickening and decompression at the mid ascending colon, pulmonary nodules possibly metastatic, small ascites, and mild anasarca. Abd US demonstrated mod to severe biliary ductal dilation in left hepatic lobe, multiple hypoechoic indeterminate liver lesions, and small ascites.     Surveillance colonoscopy 21 was unremarkable, and recall was 5 years. He previously had a 4mm tubular adenoma on colonoscopy in 2015.    REVIEW OF SYSTEMS: Negative except noted above    MEDICATIONS:   Prior to Admission medications    Not on File       ALLERGIES: No Known Allergies    SOCIAL HISTORY:  Social History     Tobacco Use    Smoking status: Former     Current packs/day: 0.00     Average packs/day: 1 pack/day for 41.0 years (41.0 ttl pk-yrs)     Types: Cigarettes     Start date:      Quit date: 2020     Years since quittin.4    Smokeless tobacco: Never   Substance Use Topics    Alcohol use: Yes     Comment: 6 per week    Drug use: Never       FAMILY HISTORY:  Family History   Problem Relation Age of Onset    Colon Cancer No family hx of        PHYSICAL EXAM:   General: No acute distress. Alert and oriented x3.   /66   Pulse 79   Temp 97.6  F (36.4  C) (Temporal)   Resp 18   Ht 1.727 m (5' 8\")   Wt 59 kg (130 lb 1.1 oz)   SpO2 100%   BMI 19.78 kg/m      EYES: icteric  NECK: no JVD  RESPIRATORY: ctab  CARDIOVASCULAR: RRR  GASTROINTESTINAL: Soft, no distention. Mild generalized tenderness. No rebound, guarding, or rigidity. Active bowel sounds  EXTREMITIES: Dry to touch, warm. No edema.  SKIN/HAIR/NAILS: jaundice  PSYCHIATRIC: Orientated to self, place, and situation. " Normal affect.        ADDITIONAL COMMENTS:   I reviewed the patient's new clinical lab test results.   Recent Labs   Lab Test 06/24/25  0642   WBC 14.5*   HGB 11.3*   MCV 91      INR 1.09     Recent Labs   Lab Test 06/24/25  0642   POTASSIUM 3.6   CHLORIDE 97*   CO2 24   BUN 52.3*   ANIONGAP 13     Recent Labs   Lab Test 06/24/25  0833 06/24/25  0642   ALBUMIN  --  3.0*   BILITOTAL  --  33.4*   ALT  --  140*   AST  --  135*   PROTEIN 10*  --    LIPASE  --  31     MELD 3.0: 32 at 6/24/2025  6:42 AM  MELD-Na: 29 at 6/24/2025  6:42 AM  Calculated from:  Serum Creatinine: 2.24 mg/dL at 6/24/2025  6:42 AM  Serum Sodium: 134 mmol/L at 6/24/2025  6:42 AM  Total Bilirubin: 33.4 mg/dL at 6/24/2025  6:42 AM  Serum Albumin: 3 g/dL at 6/24/2025  6:42 AM  INR(ratio): 1.09 at 6/24/2025  6:42 AM  Age at listing (hypothetical): 60 years  Sex: Male at 6/24/2025  6:42 AM      IMAGING     CTCAP w/o contrast 6/24/25  IMPRESSION:  1.  Findings worrisome for malignancy within the liver. The liver appears heterogeneous with suggestion of underlying hepatic masses primarily at the right liver. There is severe left worse than right intrahepatic biliary ductal dilatation with cutoff of the biliary tree at the central liver. Recommend correlation with hepatic MRI and MRCP.  2.  Indeterminate focal region of wall thickening and decompression at the mid ascending colon. This should be considered a colonic mass until proven otherwise. Recommend correlation with colonoscopy.  3.  Several indeterminate pulmonary nodules bilaterally. Metastatic disease is possible. This could also relate to an atypical infectious or inflammatory etiology. Recommend follow-up short interval CT chest in 3 months.  4.  Small ascites. A few borderline prominent upper abdominal lymph nodes are suggested but these are limited in view.  5.  Technically indeterminate small sclerosis at the left ischial tuberosity.  6.  Mild anasarca.    Abd US  6/24/25  IMPRESSION:  1.  Moderate to severe intrahepatic biliary ductal dilatation in the left hepatic lobe. The common duct is only visualized at the hepatic hilum where it is dilated to 12 mm. Consider MRCP for further evaluation.  2.  Heterogeneous liver with multiple indeterminate hypoechoic lesions in the liver. The lesions are better seen on the noncontrast CT performed today. These are indeterminate but suspicious for malignancy. These can also be evaluated on the MRCP.  3.  Small ascites.      ENDOSCOPIC EVALUATION     None yet this admission    ASSESSMENT & PLAN       Active Problems:    Elevated LFTs    Liver mass    Unexplained weight loss    PIYUSH     Possible Colonic mass      Assessment: Zoltan Dalton is a 60 year old male with a history of tobacco use who presented on 6/24 for abdominal pain and was admitted with PIYUSH, pulmonary nodules, elevated LFTs, weight loss, liver mass, and colonic mass.    1. Elevated LFTs, liver mass: Likely malignancy. No known history of liver disease. No recent ETOH use but previously ~6 drinks/week for years.  Labs on admit: Tbili 33.4, , , alk phos 489. WBC 14.5, Hgb 11.3. INR 1.09. He presented with PIYUSH with Cr 2.24, BUN 52.3, and GFR 33. Normal lipase.    CTCAP showed liver masses suggestive of malignancy and severe intrahepatic biliary ductal dilation with cutoff at the central liver. This is redemonstrated on ultrasound.  -- Discussed with biliary provider. To rule out primary hepatic mass vs multiple satellite lesions suggestive of metastases, will obtain MRCP. We will plan for ERCP 6/26. Will give 5mg IV vit K PM today.  Pending MRCP results, may add EUS.     He is not a candidate for steroids as this does not appear to be alcoholic hepatitis, and also concern for infection with leukocytosis, abnormal UA w/ pending culture, and possible pulmonary infection. Consider blood cultures.    Elevated LFTs and biliary dilation likely related to metastatic  disease. AFP and CA 19-9 pending. Will also check hepatitis panel.    2. Possible colon mass: Noted on admission CT. Previous colonoscopy in 2021 unremarkable, though on colonoscopy in 2015 had a 4mm tubular adenoma in the transverse colon. Weight loss of 40 lbs in last 4 months associated with decreased appetite. Increased constipation recently, but otherwise no alarm symptoms indicative of colon cancer.     We will complete bowel prep today with colonoscopy tomorrow. Will also consult colorectal surgery.      PLAN:  -Hepatitis panel ordered  -Diagnostic para with cell count  -MRCP to check for solitary mass vs multiple satellite masses suggestive of metastatic lesions  -5mg IV vitK 6/24 PM  -AFP, CEA, CA 19-9 pending  -Bowel prep today with clear liquid diet, NPO at midnight  -Mag citrate contraindicated, PRN golytely 2L in AM if not cleared out  -Colonoscopy tomorrow  -ERCP 6/26, pending MRCP may add EUS  -Colorectal surgery consulted    45 minutes of total time was spent providing patient care, including patient evaluation, reviewing documentation/ test results, and .     I discussed the patient's findings and plan with Dr. Ramirez.    Lisa Sneed, S, PA-C  [facility]  Weekdays after 5 pm and weekends: [phone]

## 2025-06-24 NOTE — ED NOTES
[facility]    ED Boarding Nurse Handoff Addendum Report:    Date/time: 6/24/2025, 2:37 PM    Neuro: Alert and Oriented x4  Activity: are SBA with IV pole  Telemetry Monitoring: No  Pain: denying pain.  O2: RA  LDA's: Peripheral  Fluids: has Lactated Ringer's running at 125 mL per hour.  Diet: Clear liquid  Consults: GI  Discharge Disposition: TBD    Plan of Care:    Colonoscopy: 6/25/2025  ERCP: 6/26/2025    Vital signs (within last 30 minutes):    Vitals:    06/24/25 1130 06/24/25 1200 06/24/25 1220 06/24/25 1400   BP: 111/60 109/51  118/66   Pulse: 71 69  75   Resp:  18  18   Temp:    98  F (36.7  C)   TempSrc:    Oral   SpO2: 98% 100% 97% 100%   Weight:       Height:           ED Boarding Nurse name: Frances Nath RN

## 2025-06-24 NOTE — ED PROVIDER NOTES
Emergency Department Note      History of Present Illness     Chief Complaint   Abdominal Pain      HPI   Zoltan Dalton is a 60 year old male with a past medical history significant for tobacco abuse who presents to the emergency department for evaluation of abdominal pain. He reports that for the past several months, he has been experiencing persistent epigastric pain that seems to be brought on with certain foods and meals. He does also feel intermittent pain to his lower abdomen as well. He endorses lower appetite compared to normal as well as fatigue and dizziness. He also has been feeling off balance on his feet. He states that he normally is fairly healthy and does not usually follow with a PCP. However, over the past several months, he has lost up to 40 pounds. Prior to this, he reports that his appetite was much better but this has been decreased as of recently. He has also noticed dark, discolored urine as well as blurry vision. He denies any fevers, chills, or other infectious symptoms. He denies experiencing any prior episodes of this. He does endorse occasional tobacco use, but denies recent alcohol use and was not a daily drinker in the past.     Independent Historian   None    Review of External Notes   I reviewed the hospital admission note from 4/6/21, for which the patient was seen for colonoscopy.    Past Medical History     Medical History and Problem List   ED  Tobacco abuse    Medications   Sildenafil  bisacodyl  varenicline    Surgical History   colonoscopy    Physical Exam     Patient Vitals for the past 24 hrs:   BP Temp Temp src Pulse Resp SpO2 Height Weight   06/24/25 0829 134/66 -- -- 79 -- 100 % -- --   06/24/25 0800 127/61 -- -- 75 18 100 % -- --   06/24/25 0759 127/61 -- -- 75 -- 100 % -- --   06/24/25 0730 127/60 -- -- 74 -- 100 % -- --   06/24/25 0707 126/64 -- -- -- -- 100 % -- --   06/24/25 0642 124/77 -- -- 79 -- 100 % -- --   06/24/25 0641 -- -- -- -- -- 100 % -- --   06/24/25  "0640 -- -- -- -- -- 100 % -- --   06/24/25 0639 124/77 -- -- 79 -- -- -- --   06/24/25 0628 130/50 97.6  F (36.4  C) Temporal 102 18 99 % 1.727 m (5' 8\") 59 kg (130 lb 1.1 oz)     Physical Exam  General: Thin, resting comfortably when I enter the room  Eyes: Pupils equal, conjunctivae pink no scleral icterus or conjunctival injection  ENT:  Moist mucus membranes  Respiratory:  Lungs clear to auscultation bilaterally, no crackles/rubs/wheezes.  Good air movement  CV: Normal rate and rhythm, no murmurs  GI:  Abdomen soft and non-distended.  No tenderness, guarding or rebound  Skin: Warm, dry.  No rashes or petechiae  Musculoskeletal: No peripheral edema or calf tenderness  Neuro: Alert and oriented to person/place/time  Psychiatric: Normal affect      Diagnostics     Lab Results   Labs Ordered and Resulted from Time of ED Arrival to Time of ED Departure   COMPREHENSIVE METABOLIC PANEL - Abnormal       Result Value    Sodium 134 (*)     Potassium 3.6      Carbon Dioxide (CO2) 24      Anion Gap 13      Urea Nitrogen 52.3 (*)     Creatinine 2.24 (*)     GFR Estimate 33 (*)     Calcium 12.0 (*)     Chloride 97 (*)     Glucose 107 (*)     Alkaline Phosphatase 489 (*)      (*)      (*)     Protein Total 5.9 (*)     Albumin 3.0 (*)     Bilirubin Total 33.4 (*)    ROUTINE UA WITH MICROSCOPIC REFLEX TO CULTURE - Abnormal    Color Urine Dark Yellow (*)     Appearance Urine Slightly Cloudy (*)     Glucose Urine Negative      Bilirubin Urine Large (*)     Ketones Urine Trace (*)     Specific Gravity Urine 1.015      Blood Urine Moderate (*)     pH Urine 5.5      Protein Albumin Urine 10 (*)     Urobilinogen Urine Normal      Nitrite Urine Negative      Leukocyte Esterase Urine Negative      Mucus Urine Present (*)     RBC Urine 3 (*)     WBC Urine 17 (*)     Squamous Epithelials Urine 5 (*)     Hyaline Casts Urine 68 (*)     Cellular Casts Urine 43 (*)     Granular Casts Urine 45 (*)    CBC WITH PLATELETS AND " DIFFERENTIAL - Abnormal    WBC Count 14.5 (*)     RBC Count 3.37 (*)     Hemoglobin 11.3 (*)     Hematocrit 30.7 (*)     MCV 91      MCH 33.5 (*)     MCHC 36.8 (*)     RDW 23.4 (*)     Platelet Count 277      % Neutrophils 81      % Lymphocytes 10      % Monocytes 8      % Eosinophils 0      % Basophils 0      % Immature Granulocytes 1      NRBCs per 100 WBC 0      Absolute Neutrophils 11.7 (*)     Absolute Lymphocytes 1.5      Absolute Monocytes 1.1      Absolute Eosinophils 0.0      Absolute Basophils 0.0      Absolute Immature Granulocytes 0.2      Absolute NRBCs 0.0     CK TOTAL - Abnormal     (*)    LIPASE - Normal    Lipase 31     CEA   AFP TUMOR MARKER   CANCER ANTIGEN 19-9   URINE CULTURE       Imaging   US Abdomen Limited (RUQ)   Final Result   IMPRESSION:   1.  Moderate to severe intrahepatic biliary ductal dilatation in the left hepatic lobe. The common duct is only visualized at the hepatic hilum where it is dilated to 12 mm. Consider MRCP for further evaluation.   2.  Heterogeneous liver with multiple indeterminate hypoechoic lesions in the liver. The lesions are better seen on the noncontrast CT performed today. These are indeterminate but suspicious for malignancy. These can also be evaluated on the MRCP.   3.  Small ascites.            CT Chest Abdomen Pelvis w/o Contrast   Final Result   IMPRESSION:   1.  Findings worrisome for malignancy within the liver. The liver appears heterogeneous with suggestion of underlying hepatic masses primarily at the right liver. There is severe left worse than right intrahepatic biliary ductal dilatation with cutoff of    the biliary tree at the central liver. Recommend correlation with hepatic MRI and MRCP.   2.  Indeterminate focal region of wall thickening and decompression at the mid ascending colon. This should be considered a colonic mass until proven otherwise. Recommend correlation with colonoscopy.   3.  Several indeterminate pulmonary nodules  bilaterally. Metastatic disease is possible. This could also relate to an atypical infectious or inflammatory etiology. Recommend follow-up short interval CT chest in 3 months.   4.  Small ascites. A few borderline prominent upper abdominal lymph nodes are suggested but these are limited in view.   5.  Technically indeterminate small sclerosis at the left ischial tuberosity.   6.  Mild anasarca.        EKG   None    Independent Interpretation       ED Course      Medications Administered   Medications   sodium chloride 0.9% BOLUS 1,000 mL (1,000 mLs Intravenous $New Bag 6/24/25 8612)       Procedures   Procedures     Discussion of Management   Admitting Hospitalist, Dr. Rhodes  [facility], Patricia JAMISON for Dr. Ramirez    ED Course   ED Course as of 06/24/25 1121 Tue Jun 24, 2025   0706 I obtained history and examined the patient as noted above.     1010 I rechecked the patient and explained findings.   1048 I spoke with Patricia JAMISON for Dr. Ramirez from [facility] regarding patient care.   1120 I spoke with Joaquin for Dr. Rhodes from [facility], who accepts the patient for admission.       Additional Documentation  None    Medical Decision Making / Diagnosis     CMS Diagnoses: None    MIPS   None    MDM   Zoltan Dalton is a 60 year old male who presents to the emergency department with a complaint of fatigue, dark urine, weight loss, and abdominal pain.  On exam patient is thin.  He is not in any acute distress.  Vital signs are all within acceptable limits.  Patient's abdomen is not tender to palpation.  No distention or guarding.  No lower extremity edema.  Lung sounds are clear.  Patient reports that he is lost 40 pounds over the past 4 months.    CT of the chest abdomen and pelvis shows colon mass, hepatic mass, pulmonary nodules.  The patient has transaminitis with a bilirubin of 33.  Patient does need an MRCP. Spoke with GI, ok to stay here at [facility]. Spoke with the patient and told him this is most likely  cancer and he needs admission to the hospital for further testing and treatment. He is agreeable. Patient is admitted.     Disposition   The patient was admitted to the hospital.     Diagnosis     ICD-10-CM    1. PIYUSH (acute kidney injury)  N17.9       2. Colonic mass  K63.89       3. Liver mass  R16.0       4. Pulmonary nodules  R91.8       5. Transaminitis  R74.01       6. Elevated bilirubin  R17       7. Unexplained weight loss  R63.4       8. Other fatigue  R53.83            Discharge Medications   New Prescriptions    No medications on file         Scribe Disclosure:  I, Salud Dalton, am serving as a scribe at 7:12 AM on 6/24/2025 to document services personally performed by Gema Dinaa MD based on my observations and the provider's statements to me.        Gema Diana MD  06/24/25 7118

## 2025-06-24 NOTE — PROGRESS NOTES
"Hospital Medicine Brief Note:  6/24/2025 2:21 PM     /51   Pulse 69   Temp 97.6  F (36.4  C) (Temporal)   Resp 18   Ht 1.727 m (5' 8\")   Wt 59 kg (130 lb 1.1 oz)   SpO2 97%   BMI 19.78 kg/m       Discussed with GI -- appreciate their assistance.  Plan for colonoscopy in the am -- 6/25/25 and ERCP on 6/26/25.                            Please page Medicine On-call for any acute medical issues that may arise.  We maybe contacted via Vocera --   \"Admitting Hospitalist [facility]\"      Ham Nuñez Ed.D, APRN, CNP  Hospital Medicine   [address]   "

## 2025-06-24 NOTE — ED TRIAGE NOTES
"Here for concern of upper abdominal pain associated with constipation, dizziness, balance is off, and lack of energy. Stated have been losing weight since March, lost about 40 lbs. Stated was eating well up till March, but has been not able to eat will due to \"my digestion\" is bad. ABCs intact.     Triage Assessment (Adult)       Row Name 06/24/25 9260          Triage Assessment    Airway WDL WDL        Respiratory WDL    Respiratory WDL WDL        Cardiac WDL    Cardiac WDL WDL                     "

## 2025-06-24 NOTE — H&P
[facility]    History and Physical - Hospitalist Service       Date of Admission:  6/24/2025    Assessment & Plan      Zoltan Dalton is a 60 year old male who has a PMH of tobacco use who presents to [facility] ED for evaluation of abdominal pain.    Presumed hepatic malignancy.  Constellation of symptoms includes early satiety, 40# weight loss, epigastric and abdominal pain.    Transaminitis:  Likely secondary to hepatic malignancy.     Right intrahepatic biliary ductal dilatation 12 mm.    Acute kidney injury  Colonic mass  Pulmonary nodules  Weight loss/failure to thrive  Tobacco use disorder      Plan:  -- Admit to Medicine  -- GI consult. Will eventually need oncology +/- colon rectal surgery input as well.   -- NPO  -- IVF hydration   -- Sending CEA, CA 19-9,and AFP.  -- Check UA with UC.  Will also obtain other baseline labs including INR, TSH, A1C and acute hepatitis panel for completion.    -- Repeat CBC and CMP in the Am.   -- Multimodal pain management.  Antiemetics.        Diet: NPO for Medical/Clinical Reasons Except for: Meds, Ice Chips  DVT Prophylaxis: Pneumatic Compression Devices  Maki Catheter: Not present  Lines: None     Cardiac Monitoring: None  Code Status: Full Code    Clinically Significant Risk Factors Present on Admission         # Hyponatremia: Lowest Na = 134 mmol/L in last 2 days, will monitor as appropriate  # Hypochloremia: Lowest Cl = 97 mmol/L in last 2 days, will monitor as appropriate   # Hypercalcemia: Highest Ca = 12 mg/dL in last 2 days, will monitor as appropriate    # Hypoalbuminemia: Lowest albumin = 3 g/dL at 6/24/2025  6:42 AM, will monitor as appropriate                          Disposition Plan     Medically Ready for Discharge: Anticipated in 2-4 Days         The patient's care was discussed with the Bedside Nurse, Patient, and EM Team.    GUILHERME Hansen CNP  Hospitalist Service  [facility]  Securely message  with Wilder (more info)  Text page via [facility] Paging/Directory     ______________________________________________________________________    Chief Complaint   Multiple    History is obtained from the patient, electronic health record, and emergency department physician    History of Present Illness   Zoltan Dalton is a 60 year old male who has a PMH of tobacco use who presents to [facility] ED for evaluation of abdominal pain.  He endorses a several month history of persistent epigastric and lower abdominal pain, early satiety, fatigue, dizziness, and 40# weight loss.  He endorses dark colored urine.  No fever, chlls, or other infectious symptoms.  He does not have an established PCP and has not been seen by a provider in several years.      ED course: IV, labs, imaging, tx.  Pertinent findings:  Lab: Bili 33.4, Scr 2.24/BUN 52.3, LFT  -- alk phos 489, /, glucose 107. WBC 14.5  Imaging: Abd US with moderate to severe intrahepatic biliary ductal dilatation L hepatic lobe.  Limited view of common duct but noted to be dilated to 12 mm.  Heterogenous liver with multiple indeterminate hypoechoic lesions. Small ascites.  CT CAP (non contrast due to PIYUSH): Concerning for malignancy within the liver.  Heterogenous with concerns for hepatic masses at the right liver.  Right intrahepatic biliary ductal dilatation with cutoff of the biliary tree at the central liver.  Indeterminate focal regional of wall thickening and decompression at the mid ascending colon -- concerning for colonic mass until proven otherwise.  Several indeterminate pulmonary nodules with metastatic disease on the differential vs atypical infectious or inflammatory etiology.  Small ascites/anasarca and technically indeterminate small sclerosis area at the level of left ischial tuberosity.  Tx: NS bolus 1 L    Previous records:  Hep C viral ab negative  4/2021  C-scope 4/2021 negative.  Repeat 5 years for surveillance.       Past Medical  History    Tobacco use     Past Surgical History   Past Surgical History:   Procedure Laterality Date    COLONOSCOPY      COLONOSCOPY N/A 2021    Procedure: COLONOSCOPY (fv);  Surgeon: Ambrose Vargas MD;  Location:  GI       Prior to Admission Medications   None        Review of Systems    The 10 point Review of Systems is negative other than noted in the HPI or here.     Social History   I have reviewed this patient's social history and updated it with pertinent information if needed.  Social History     Tobacco Use    Smoking status: Former     Current packs/day: 0.00     Average packs/day: 1 pack/day for 41.0 years (41.0 ttl pk-yrs)     Types: Cigarettes     Start date:      Quit date: 2020     Years since quittin.4    Smokeless tobacco: Never   Substance Use Topics    Alcohol use: Yes     Comment: 6 per week    Drug use: Never         Family History     Denies any first degree relative history of cancer.        Allergies   No Known Allergies     Physical Exam   Vital Signs: Temp: 97.6  F (36.4  C) Temp src: Temporal BP: 109/51 Pulse: 69   Resp: 18 SpO2: 100 % O2 Device: None (Room air)    Weight: 130 lbs 1.14 oz    GEN:   Alert, oriented x 3, appears comfortable, NAD.  NECK:   Supple ,no mass or thyromegaly   HEENT:  Normocephalic/atraumatic, no scleral icterus, no nasal discharge, mouth moist.  CV:   Regular rate and rhythm, no murmur or JVD.  S1 + S2 noted, no S3 or S4.  LUNGS:   Clear to auscultation bilaterally without rales/rhonchi/wheezing/retractions.  Symmetric chest rise on inhalation noted.  ABD:   Active bowel sounds, soft, mild tenderness/non-distended.  No rebound/guarding/rigidity.  EXT:   No edema.  No cyanosis.  No joint synovitis noted.  SKIN:   Dry to touch, no exanthems noted in the visualized areas.  Neurologic: Grossly intact,non focal.   Neuropsychiatric:  General: normal, calm and normal eye contact  Level of consciousness: alert / normal  Affect:  normal  Orientation: oriented to self, place, time and situation     Medical Decision Making       90 MINUTES SPENT BY ME on the date of service doing chart review, history, exam, documentation & further activities per the note.      Data   ------------------------- PAST 24 HR DATA REVIEWED -----------------------------------------------    I have personally reviewed the following data over the past 24 hrs:    14.5 (H)  \   11.3 (L)   / 277     134 (L) 97 (L) 52.3 (H) /  107 (H)   3.6 24 2.24 (H) \     ALT: 140 (H) AST: 135 (H) AP: 489 (H) TBILI: 33.4 (HH)   ALB: 3.0 (L) TOT PROTEIN: 5.9 (L) LIPASE: 31     INR:  1.09 PTT:  N/A   D-dimer:  N/A Fibrinogen:  N/A       Imaging results reviewed over the past 24 hrs:   Recent Results (from the past 24 hours)   CT Chest Abdomen Pelvis w/o Contrast    Narrative    EXAM: CT CHEST ABDOMEN PELVIS W/O CONTRAST  LOCATION: [facility]  DATE: 6/24/2025    INDICATION: Weight loss, fatigue, pain with eating.  COMPARISON: None.  TECHNIQUE: CT scan of the chest, abdomen, and pelvis was performed without IV contrast. Multiplanar reformats were obtained. Dose reduction techniques were used.   CONTRAST: None.    FINDINGS:   LUNGS AND PLEURA: No effusions or pneumothorax. No lobar consolidation identified. There are multiple pulmonary nodules noted bilaterally. Grouping of groundglass nodularity noted at the right lower lobe, for example series 4 image 186. A focal example   at the right lower lobe is 8 x 5 mm, series 4 image 205. A rounded solid irregular left upper lobe nodule is 5 mm image 58. Nodule example along the medial inferior right upper lobe along the minor fissure is 14 x 9 mm image 172. There are other nodule   examples. Calcified granulomas at the right lower lobe noted.    MEDIASTINUM/AXILLAE: Assessment limited by low mediastinal fat. No conspicuous adenopathy or acute mediastinal abnormality identified.    CORONARY ARTERY CALCIFICATION:  Mild.    HEPATOBILIARY: Diffusely heterogeneous liver is identified worrisome for underlying neoplasm. Ill-defined hypodensity at the posterior right liver is 4.5 cm series 3 image 125. A few peripheral small hypodense nodules noted at the central liver image   140, posterior right liver image 147. There is severe left greater than the right intrahepatic biliary ductal dilatation. This ductal dilatation becomes cut off along the medial central and right liver around image 130. No conspicuous extrahepatic   biliary ductal dilatation can be visualized. Contracted gallbladder. Tiny gallstones.    PANCREAS: Unenhanced pancreas shows no conspicuous acute abnormality. This assessment is limited in assessment for mass.     SPLEEN: Mildly heterogeneous spleen. No splenic enlargement.    ADRENAL GLANDS: Bilateral adrenal thickening left greater than right. On left side this is 1.3 cm, and on the right this is approximately 0.8 cm.    KIDNEYS/BLADDER: No hydronephrosis. No visible focal renal abnormality within the limits of unenhanced scanning. No stones. Bladder shows diffuse mild wall thickening that may just relate to incomplete distention.    BOWEL: Moderate stool within the colon and rectum. A few small bowel loops are mildly distended with fluid and gas. There is nonspecific circumferential apparent wall thickening and decompression at the mid ascending colon along the right flank. This is   approximately 3.6 cm series 3 image 197.    LYMPH NODES: A few mildly prominent upper abdominal lymph nodes suggested. One example is 8 mm at the gastrohepatic region image 132. Lymph nodes are difficult to assess given the low abdominal fat. There are adjacent small examples versus vascular   varices.    VASCULATURE: Suggestion of a few vascular varices. Mild aortic calcifications.    PELVIC ORGANS: Small ascites within the abdomen and pelvis. Prominent prostate measuring 4.9 cm. Mildly high position of the left  testicle.    MUSCULOSKELETAL: Diffuse degenerative changes throughout the spine. Rounded lucency along the inferior endplate of L2 on series 8 image 66 could just be a Schmorl's node. Indeterminate patchy mild sclerosis along the left ischial tuberosity series 3   image 299. Mild anasarca.      Impression    IMPRESSION:  1.  Findings worrisome for malignancy within the liver. The liver appears heterogeneous with suggestion of underlying hepatic masses primarily at the right liver. There is severe left worse than right intrahepatic biliary ductal dilatation with cutoff of   the biliary tree at the central liver. Recommend correlation with hepatic MRI and MRCP.  2.  Indeterminate focal region of wall thickening and decompression at the mid ascending colon. This should be considered a colonic mass until proven otherwise. Recommend correlation with colonoscopy.  3.  Several indeterminate pulmonary nodules bilaterally. Metastatic disease is possible. This could also relate to an atypical infectious or inflammatory etiology. Recommend follow-up short interval CT chest in 3 months.  4.  Small ascites. A few borderline prominent upper abdominal lymph nodes are suggested but these are limited in view.  5.  Technically indeterminate small sclerosis at the left ischial tuberosity.  6.  Mild anasarca.   US Abdomen Limited (RUQ)    Narrative    EXAM: US ABDOMEN LIMITED  LOCATION: [facility]  DATE: 6/24/2025    INDICATION: elevated bili, pain with eating  COMPARISON: Concomitant CT of the abdomen and pelvis  TECHNIQUE: Limited abdominal ultrasound.    FINDINGS:    GALLBLADDER: Contracted gallbladder. Sonographic Lozano sign is negative. No gallbladder wall thickening or pericholecystic fluid. Limited evaluation for gallstones due to contracted bladder.    BILE DUCTS: Moderate to severe intrahepatic biliary ductal dilatation in the left hepatic lobe. The common duct is visualized only at the hepatic hilum  where it is dilated to 12 mm..    LIVER: Heterogeneous liver echotexture. Indeterminate ill-defined hypoechoic lesion in the right hepatic lobe measuring 1.3 cm and ill-defined hypoechoic lesion in the left hepatic lobe measuring 2.1 cm. There are several other masses in the liver seen   on the noncontrast CT today which are not seen well by ultrasound. Small cyst in the right hepatic lobe measuring 0.9 cm.. The portal vein is patent with flow in the normal direction.    RIGHT KIDNEY: No hydronephrosis.    PANCREAS: The pancreas is largely obscured by overlying gas.    Small ascites.      Impression    IMPRESSION:  1.  Moderate to severe intrahepatic biliary ductal dilatation in the left hepatic lobe. The common duct is only visualized at the hepatic hilum where it is dilated to 12 mm. Consider MRCP for further evaluation.  2.  Heterogeneous liver with multiple indeterminate hypoechoic lesions in the liver. The lesions are better seen on the noncontrast CT performed today. These are indeterminate but suspicious for malignancy. These can also be evaluated on the MRCP.  3.  Small ascites.

## 2025-06-24 NOTE — PHARMACY-ADMISSION MEDICATION HISTORY
Pharmacist Admission Medication History    Admission medication history is complete. The information provided in this note is only as accurate as the sources available at the time of the update.    Information Source(s): Patient and CareEverywhere/SureScripts via in-person    Pertinent Information: None    Changes made to PTA medication list:  Added: None  Deleted: sildenafil, varenicline  Changed: None    Allergies reviewed with patient and updates made in EHR: yes    Medication History Completed By:  Dot Man PharmD, [facility]  Emergency Medicine Clinical Pharmacist  [phone]   6/24/2025 10:13 AM    No outpatient medications have been marked as taking for the 6/24/25 encounter (Hospital Encounter).

## 2025-06-24 NOTE — ED NOTES
[facility]  ED Nurse Handoff Report    ED Chief complaint: Abdominal Pain  . ED Diagnosis:   Final diagnoses:   PIYUSH (acute kidney injury)   Colonic mass   Liver mass   Pulmonary nodules   Transaminitis   Elevated bilirubin   Unexplained weight loss   Other fatigue       Allergies: No Known Allergies    Code Status: Full Code    Activity level - Baseline/Home:  independent.  Activity Level - Current:   standby.   Lift room needed: No.   Bariatric: No   Needed: No   Isolation: No.   Infection: Not Applicable.     Respiratory status: Room air    Vital Signs (within 30 minutes):   Vitals:    06/24/25 0730 06/24/25 0759 06/24/25 0800 06/24/25 0829   BP: 127/60 127/61 127/61 134/66   Pulse: 74 75 75 79   Resp:   18    Temp:       TempSrc:       SpO2: 100% 100% 100% 100%   Weight:       Height:           Cardiac Rhythm:  ,      Pain level:    Patient confused: No.   Patient Falls Risk: nonskid shoes/slippers when out of bed.   Elimination Status: Has voided     Patient Report - Initial Complaint: Abdominal pain.   Focused Assessment:      Abnormal Results:   Labs Ordered and Resulted from Time of ED Arrival to Time of ED Departure   COMPREHENSIVE METABOLIC PANEL - Abnormal       Result Value    Sodium 134 (*)     Potassium 3.6      Carbon Dioxide (CO2) 24      Anion Gap 13      Urea Nitrogen 52.3 (*)     Creatinine 2.24 (*)     GFR Estimate 33 (*)     Calcium 12.0 (*)     Chloride 97 (*)     Glucose 107 (*)     Alkaline Phosphatase 489 (*)      (*)      (*)     Protein Total 5.9 (*)     Albumin 3.0 (*)     Bilirubin Total 33.4 (*)    ROUTINE UA WITH MICROSCOPIC REFLEX TO CULTURE - Abnormal    Color Urine Dark Yellow (*)     Appearance Urine Slightly Cloudy (*)     Glucose Urine Negative      Bilirubin Urine Large (*)     Ketones Urine Trace (*)     Specific Gravity Urine 1.015      Blood Urine Moderate (*)     pH Urine 5.5      Protein Albumin Urine 10 (*)     Urobilinogen  Urine Normal      Nitrite Urine Negative      Leukocyte Esterase Urine Negative      Mucus Urine Present (*)     RBC Urine 3 (*)     WBC Urine 17 (*)     Squamous Epithelials Urine 5 (*)     Hyaline Casts Urine 68 (*)     Cellular Casts Urine 43 (*)     Granular Casts Urine 45 (*)    CBC WITH PLATELETS AND DIFFERENTIAL - Abnormal    WBC Count 14.5 (*)     RBC Count 3.37 (*)     Hemoglobin 11.3 (*)     Hematocrit 30.7 (*)     MCV 91      MCH 33.5 (*)     MCHC 36.8 (*)     RDW 23.4 (*)     Platelet Count 277      % Neutrophils 81      % Lymphocytes 10      % Monocytes 8      % Eosinophils 0      % Basophils 0      % Immature Granulocytes 1      NRBCs per 100 WBC 0      Absolute Neutrophils 11.7 (*)     Absolute Lymphocytes 1.5      Absolute Monocytes 1.1      Absolute Eosinophils 0.0      Absolute Basophils 0.0      Absolute Immature Granulocytes 0.2      Absolute NRBCs 0.0     CK TOTAL - Abnormal     (*)    LIPASE - Normal    Lipase 31     INR - Normal    INR 1.09      PT 14.2     CEA   AFP TUMOR MARKER   CANCER ANTIGEN 19-9   PARTIAL THROMBOPLASTIN TIME   URINE CULTURE        US Abdomen Limited (RUQ)   Final Result   IMPRESSION:   1.  Moderate to severe intrahepatic biliary ductal dilatation in the left hepatic lobe. The common duct is only visualized at the hepatic hilum where it is dilated to 12 mm. Consider MRCP for further evaluation.   2.  Heterogeneous liver with multiple indeterminate hypoechoic lesions in the liver. The lesions are better seen on the noncontrast CT performed today. These are indeterminate but suspicious for malignancy. These can also be evaluated on the MRCP.   3.  Small ascites.            CT Chest Abdomen Pelvis w/o Contrast   Final Result   IMPRESSION:   1.  Findings worrisome for malignancy within the liver. The liver appears heterogeneous with suggestion of underlying hepatic masses primarily at the right liver. There is severe left worse than right intrahepatic biliary ductal  dilatation with cutoff of    the biliary tree at the central liver. Recommend correlation with hepatic MRI and MRCP.   2.  Indeterminate focal region of wall thickening and decompression at the mid ascending colon. This should be considered a colonic mass until proven otherwise. Recommend correlation with colonoscopy.   3.  Several indeterminate pulmonary nodules bilaterally. Metastatic disease is possible. This could also relate to an atypical infectious or inflammatory etiology. Recommend follow-up short interval CT chest in 3 months.   4.  Small ascites. A few borderline prominent upper abdominal lymph nodes are suggested but these are limited in view.   5.  Technically indeterminate small sclerosis at the left ischial tuberosity.   6.  Mild anasarca.          Treatments provided: Labs, IV fluids, imaging  Family Comments: not at bedside  OBS brochure/video discussed/provided to patient:  N/A  ED Medications:   Medications   sodium chloride 0.9% BOLUS 1,000 mL (1,000 mLs Intravenous $New Bag 6/24/25 5929)       Drips infusing:  No  For the majority of the shift this patient was Green.   Interventions performed were. N/a    Sepsis treatment initiated: No    Cares/treatment/interventions/medications to be completed following ED care: TBD    ED Nurse Name: Sandhya Og RN  11:53 AM

## 2025-06-25 ENCOUNTER — ANESTHESIA (OUTPATIENT)
Dept: SURGERY | Facility: CLINIC | Age: 60
End: 2025-06-25

## 2025-06-25 ENCOUNTER — ANESTHESIA EVENT (OUTPATIENT)
Dept: SURGERY | Facility: CLINIC | Age: 60
End: 2025-06-25

## 2025-06-25 LAB
ALBUMIN SERPL BCG-MCNC: 2.2 G/DL (ref 3.5–5.2)
ALBUMIN SERPL BCG-MCNC: 2.2 G/DL (ref 3.5–5.2)
ALP SERPL-CCNC: 410 U/L (ref 40–150)
ALP SERPL-CCNC: 410 U/L (ref 40–150)
ALT SERPL W P-5'-P-CCNC: 115 U/L (ref 0–70)
ALT SERPL W P-5'-P-CCNC: 115 U/L (ref 0–70)
ANION GAP SERPL CALCULATED.3IONS-SCNC: 10 MMOL/L (ref 7–15)
AST SERPL W P-5'-P-CCNC: 118 U/L (ref 0–45)
AST SERPL W P-5'-P-CCNC: 118 U/L (ref 0–45)
BACTERIA UR CULT: NO GROWTH
BILIRUB DIRECT SERPL-MCNC: 20.89 MG/DL (ref 0–0.3)
BILIRUB SERPL-MCNC: 26.6 MG/DL
BILIRUB SERPL-MCNC: 26.6 MG/DL
BUN SERPL-MCNC: 51.4 MG/DL (ref 8–23)
CALCIUM SERPL-MCNC: 10.8 MG/DL (ref 8.8–10.4)
CHLORIDE SERPL-SCNC: 106 MMOL/L (ref 98–107)
CREAT SERPL-MCNC: 1.5 MG/DL (ref 0.67–1.17)
EGFRCR SERPLBLD CKD-EPI 2021: 53 ML/MIN/1.73M2
ERYTHROCYTE [DISTWIDTH] IN BLOOD BY AUTOMATED COUNT: 23.5 % (ref 10–15)
GLUCOSE SERPL-MCNC: 88 MG/DL (ref 70–99)
HAV IGM SERPL QL IA: NONREACTIVE
HBV CORE IGM SERPL QL IA: NONREACTIVE
HBV SURFACE AG SERPL QL IA: NONREACTIVE
HCO3 SERPL-SCNC: 23 MMOL/L (ref 22–29)
HCT VFR BLD AUTO: 28.1 % (ref 40–53)
HCV AB SERPL QL IA: NONREACTIVE
HGB BLD-MCNC: 10.5 G/DL (ref 13.3–17.7)
MAGNESIUM SERPL-MCNC: 2.5 MG/DL (ref 1.7–2.3)
MCH RBC QN AUTO: 33.9 PG (ref 26.5–33)
MCHC RBC AUTO-ENTMCNC: 37.4 G/DL (ref 31.5–36.5)
MCV RBC AUTO: 91 FL (ref 78–100)
PHOSPHATE SERPL-MCNC: 2.7 MG/DL (ref 2.5–4.5)
PLATELET # BLD AUTO: 309 10E3/UL (ref 150–450)
POTASSIUM SERPL-SCNC: 2.9 MMOL/L (ref 3.4–5.3)
POTASSIUM SERPL-SCNC: 3.2 MMOL/L (ref 3.4–5.3)
PROT SERPL-MCNC: 4.7 G/DL (ref 6.4–8.3)
PROT SERPL-MCNC: 4.7 G/DL (ref 6.4–8.3)
RBC # BLD AUTO: 3.1 10E6/UL (ref 4.4–5.9)
SODIUM SERPL-SCNC: 139 MMOL/L (ref 135–145)
WBC # BLD AUTO: 12.5 10E3/UL (ref 4–11)

## 2025-06-25 PROCEDURE — 82248 BILIRUBIN DIRECT: CPT | Performed by: STUDENT IN AN ORGANIZED HEALTH CARE EDUCATION/TRAINING PROGRAM

## 2025-06-25 PROCEDURE — 84132 ASSAY OF SERUM POTASSIUM: CPT | Performed by: STUDENT IN AN ORGANIZED HEALTH CARE EDUCATION/TRAINING PROGRAM

## 2025-06-25 PROCEDURE — 258N000003 HC RX IP 258 OP 636: Performed by: NURSE PRACTITIONER

## 2025-06-25 PROCEDURE — 250N000013 HC RX MED GY IP 250 OP 250 PS 637: Performed by: STUDENT IN AN ORGANIZED HEALTH CARE EDUCATION/TRAINING PROGRAM

## 2025-06-25 PROCEDURE — 84100 ASSAY OF PHOSPHORUS: CPT | Performed by: STUDENT IN AN ORGANIZED HEALTH CARE EDUCATION/TRAINING PROGRAM

## 2025-06-25 PROCEDURE — 85018 HEMOGLOBIN: CPT | Performed by: NURSE PRACTITIONER

## 2025-06-25 PROCEDURE — 99254 IP/OBS CNSLTJ NEW/EST MOD 60: CPT

## 2025-06-25 PROCEDURE — 99232 SBSQ HOSP IP/OBS MODERATE 35: CPT | Performed by: STUDENT IN AN ORGANIZED HEALTH CARE EDUCATION/TRAINING PROGRAM

## 2025-06-25 PROCEDURE — 36415 COLL VENOUS BLD VENIPUNCTURE: CPT | Performed by: NURSE PRACTITIONER

## 2025-06-25 PROCEDURE — 120N000001 HC R&B MED SURG/OB

## 2025-06-25 PROCEDURE — 36415 COLL VENOUS BLD VENIPUNCTURE: CPT

## 2025-06-25 PROCEDURE — 86709 HEPATITIS A IGM ANTIBODY: CPT

## 2025-06-25 PROCEDURE — 255N000002 HC RX 255 OP 636

## 2025-06-25 PROCEDURE — 84155 ASSAY OF PROTEIN SERUM: CPT | Performed by: NURSE PRACTITIONER

## 2025-06-25 PROCEDURE — 36415 COLL VENOUS BLD VENIPUNCTURE: CPT | Performed by: STUDENT IN AN ORGANIZED HEALTH CARE EDUCATION/TRAINING PROGRAM

## 2025-06-25 PROCEDURE — 250N000013 HC RX MED GY IP 250 OP 250 PS 637

## 2025-06-25 PROCEDURE — A9585 GADOBUTROL INJECTION: HCPCS

## 2025-06-25 RX ORDER — NALOXONE HYDROCHLORIDE 0.4 MG/ML
0.2 INJECTION, SOLUTION INTRAMUSCULAR; INTRAVENOUS; SUBCUTANEOUS
Status: DISCONTINUED | OUTPATIENT
Start: 2025-06-25 | End: 2025-07-10 | Stop reason: HOSPADM

## 2025-06-25 RX ORDER — POTASSIUM CHLORIDE 1.5 G/1.58G
20 POWDER, FOR SOLUTION ORAL ONCE
Status: COMPLETED | OUTPATIENT
Start: 2025-06-25 | End: 2025-06-25

## 2025-06-25 RX ORDER — GADOBUTROL 604.72 MG/ML
5.5 INJECTION INTRAVENOUS ONCE
Status: COMPLETED | OUTPATIENT
Start: 2025-06-25 | End: 2025-06-25

## 2025-06-25 RX ORDER — NALOXONE HYDROCHLORIDE 0.4 MG/ML
0.4 INJECTION, SOLUTION INTRAMUSCULAR; INTRAVENOUS; SUBCUTANEOUS
Status: DISCONTINUED | OUTPATIENT
Start: 2025-06-25 | End: 2025-07-10 | Stop reason: HOSPADM

## 2025-06-25 RX ORDER — POTASSIUM CHLORIDE 1.5 G/1.58G
40 POWDER, FOR SOLUTION ORAL ONCE
Status: COMPLETED | OUTPATIENT
Start: 2025-06-25 | End: 2025-06-25

## 2025-06-25 RX ADMIN — SODIUM CHLORIDE, SODIUM LACTATE, POTASSIUM CHLORIDE, AND CALCIUM CHLORIDE: .6; .31; .03; .02 INJECTION, SOLUTION INTRAVENOUS at 14:45

## 2025-06-25 RX ADMIN — GADOBUTROL 5.5 ML: 604.72 INJECTION INTRAVENOUS at 21:18

## 2025-06-25 RX ADMIN — POTASSIUM CHLORIDE 20 MEQ: 1.5 POWDER, FOR SOLUTION ORAL at 16:13

## 2025-06-25 RX ADMIN — POTASSIUM CHLORIDE 40 MEQ: 1.5 POWDER, FOR SOLUTION ORAL at 14:43

## 2025-06-25 RX ADMIN — SODIUM CHLORIDE, SODIUM LACTATE, POTASSIUM CHLORIDE, AND CALCIUM CHLORIDE: .6; .31; .03; .02 INJECTION, SOLUTION INTRAVENOUS at 07:29

## 2025-06-25 RX ADMIN — SODIUM CHLORIDE, SODIUM LACTATE, POTASSIUM CHLORIDE, AND CALCIUM CHLORIDE: .6; .31; .03; .02 INJECTION, SOLUTION INTRAVENOUS at 23:29

## 2025-06-25 RX ADMIN — POLYETHYLENE GLYCOL-3350 AND ELECTROLYTES 2000 ML: 236; 6.74; 5.86; 2.97; 22.74 POWDER, FOR SOLUTION ORAL at 18:10

## 2025-06-25 ASSESSMENT — ACTIVITIES OF DAILY LIVING (ADL)
ADLS_ACUITY_SCORE: 41
ADLS_ACUITY_SCORE: 39
ADLS_ACUITY_SCORE: 39
ADLS_ACUITY_SCORE: 28
ADLS_ACUITY_SCORE: 41
ADLS_ACUITY_SCORE: 28
ADLS_ACUITY_SCORE: 39
ADLS_ACUITY_SCORE: 41
ADLS_ACUITY_SCORE: 41
ADLS_ACUITY_SCORE: 39
ADLS_ACUITY_SCORE: 28
ADLS_ACUITY_SCORE: 39
ADLS_ACUITY_SCORE: 41
ADLS_ACUITY_SCORE: 28
ADLS_ACUITY_SCORE: 41
ADLS_ACUITY_SCORE: 39
ADLS_ACUITY_SCORE: 28
ADLS_ACUITY_SCORE: 39
ADLS_ACUITY_SCORE: 41
ADLS_ACUITY_SCORE: 41

## 2025-06-25 NOTE — PLAN OF CARE
Pt declining a consult or resources for smoking cessation. Discussed PT not here to  but offer resources and help with his non smoking life as able. Pt declining this says he is not much of a smoker anymore. Pt again politely declining any consult. Will complete orders.

## 2025-06-25 NOTE — PLAN OF CARE
"Aox4, VSS, on RA, no tele. PT to have MRCP done this evening.  Currently on bowel prep with second jug to start at 1800 for endoscopy in the AM, finished the first jug around 1800.  PT educated on the goal for bowel prep being 4 cups an hour until done, PT and son agreeable to instructions, will continue to monitor progress until end of shift at 19:30.  PT now on k/mg/phos, k replaced today.  LR running at 125.  Dscharge TBD.    Goal Outcome Evaluation:      Plan of Care Reviewed With: patient    Overall Patient Progress: improvingOverall Patient Progress: improving    Outcome Evaluation: PT on bowel prep, cont on IV fluids, endoscopy in the AM      Problem: Adult Inpatient Plan of Care  Goal: Plan of Care Review  Description: The Plan of Care Review/Shift note should be completed every shift.  The Outcome Evaluation is a brief statement about your assessment that the patient is improving, declining, or no change.  This information will be displayed automatically on your shift  note.  Outcome: Progressing  Flowsheets (Taken 6/25/2025 1727)  Outcome Evaluation: PT on bowel prep, cont on IV fluids, endoscopy in the AM  Plan of Care Reviewed With: patient  Overall Patient Progress: improving  Goal: Patient-Specific Goal (Individualized)  Description: You can add care plan individualizations to a care plan. Examples of Individualization might be:  \"Parent requests to be called daily at 9am for status\", \"I have a hard time hearing out of my right ear\", or \"Do not touch me to wake me up as it startles  me\".  Outcome: Progressing  Goal: Absence of Hospital-Acquired Illness or Injury  Outcome: Progressing  Intervention: Identify and Manage Fall Risk  Recent Flowsheet Documentation  Taken 6/25/2025 1602 by Laurie Barcenas, RN  Safety Promotion/Fall Prevention: safety round/check completed  Intervention: Prevent Skin Injury  Recent Flowsheet Documentation  Taken 6/25/2025 1602 by Laurie Barcenas, RN  Body Position: " position changed independently  Goal: Optimal Comfort and Wellbeing  Outcome: Progressing  Goal: Readiness for Transition of Care  Outcome: Progressing     Problem: Fall Injury Risk  Goal: Absence of Fall and Fall-Related Injury  Outcome: Progressing  Intervention: Promote Injury-Free Environment  Recent Flowsheet Documentation  Taken 6/25/2025 1602 by Laurie Barcenas RN  Safety Promotion/Fall Prevention: safety round/check completed       Problem: Acute Kidney Injury/Impairment  Goal: Fluid and Electrolyte Balance  Outcome: Progressing  Goal: Improved Oral Intake  Outcome: Progressing  Goal: Effective Renal Function  Outcome: Progressing       Problem: Liver Failure  Goal: Optimal Coping with Liver Failure  Outcome: Progressing  Goal: Absence of Bleeding  Outcome: Progressing  Goal: Fluid and Electrolyte Balance  Outcome: Progressing  Goal: Optimal Gastrointestinal Function  Outcome: Progressing  Goal: Blood Glucose Level Within Target Range  Outcome: Progressing  Goal: Hemodynamic Stability  Outcome: Progressing  Goal: Absence of Infection Signs and Symptoms  Outcome: Progressing  Goal: Optimal Neurologic Function  Outcome: Progressing  Goal: Improved Oral Intake  Outcome: Progressing  Goal: Optimal Pain Control, Comfort and Function  Outcome: Progressing  Goal: Effective Oxygenation and Ventilation  Outcome: Progressing  Intervention: Promote Airway Secretion Clearance  Recent Flowsheet Documentation  Taken 6/25/2025 1602 by Laurie Barcenas RN  Activity Management: up ad lyle

## 2025-06-25 NOTE — PROGRESS NOTES
"    GASTROENTEROLOGY PROGRESS NOTE    CC: Elevated LFTs, liver mass, possible colon mass    Of note not mentioned yesterday on consult, pt is a daily cigarette smoker.    SUBJECTIVE:  Feeling better today, less abdominal pressure since beginning bowel prep.    OBJECTIVE:  General Appearance:  Lying comfortably in bed. Ex partner at bedside.  /54 (BP Location: Left arm)   Pulse 70   Temp 97.6  F (36.4  C) (Oral)   Resp 18   Ht 1.727 m (5' 8\")   Wt 56.4 kg (124 lb 4.8 oz)   SpO2 98%   BMI 18.90 kg/m      Patient Vitals for the past 72 hrs:   Weight   06/24/25 1500 56.4 kg (124 lb 4.8 oz)   06/24/25 0628 59 kg (130 lb 1.1 oz)       PHYSICAL EXAM    EYES: icteric  RESPIRATORY: unlabored breathing  GASTROINTESTINAL: Soft, nontender to palpation. Active bowel sounds.  SKIN/HAIR/NAILS: jaundice  NEUROLOGIC: Alert and oriented      Additional Comments:  ROS, FH, SH: See initial GI consult for details.    I have reviewed the patient's new clinical lab results:    Recent Labs   Lab Test 06/24/25  0642   WBC 14.5*   HGB 11.3*   MCV 91      INR 1.09     Recent Labs   Lab Test 06/24/25  0642   POTASSIUM 3.6   CHLORIDE 97*   CO2 24   BUN 52.3*   ANIONGAP 13     Recent Labs   Lab Test 06/24/25  0833 06/24/25  0642   ALBUMIN  --  3.0*   BILITOTAL  --  33.4*   ALT  --  140*   AST  --  135*   PROTEIN 10*  --    LIPASE  --  31         IMAGING     CTCAP w/o contrast 6/24/25  IMPRESSION:  1.  Findings worrisome for malignancy within the liver. The liver appears heterogeneous with suggestion of underlying hepatic masses primarily at the right liver. There is severe left worse than right intrahepatic biliary ductal dilatation with cutoff of the biliary tree at the central liver. Recommend correlation with hepatic MRI and MRCP.  2.  Indeterminate focal region of wall thickening and decompression at the mid ascending colon. This should be considered a colonic mass until proven otherwise. Recommend correlation with " colonoscopy.  3.  Several indeterminate pulmonary nodules bilaterally. Metastatic disease is possible. This could also relate to an atypical infectious or inflammatory etiology. Recommend follow-up short interval CT chest in 3 months.  4.  Small ascites. A few borderline prominent upper abdominal lymph nodes are suggested but these are limited in view.  5.  Technically indeterminate small sclerosis at the left ischial tuberosity.  6.  Mild anasarca.     Abd US 6/24/25  IMPRESSION:  1.  Moderate to severe intrahepatic biliary ductal dilatation in the left hepatic lobe. The common duct is only visualized at the hepatic hilum where it is dilated to 12 mm. Consider MRCP for further evaluation.  2.  Heterogeneous liver with multiple indeterminate hypoechoic lesions in the liver. The lesions are better seen on the noncontrast CT performed today. These are indeterminate but suspicious for malignancy. These can also be evaluated on the MRCP.  3.  Small ascites.    US Paracentesis 6/24/25  FINDINGS: Limited abdominal ultrasound demonstrates trace volume intraperitoneal ascites, not amenable for safe percutaneous drainage. No paracentesis performed.     ENDOSCOPIC EVALUATION     None yet this admission        Active Problems:    Elevated LFTs    Liver mass    Unexplained weight loss    PIYUSH (acute kidney injury)    Colonic mass     Assessment: Zoltan Dalton is a 60 year old male with a history of tobacco use who presented on 6/24 for abdominal pain and was admitted with PIYUSH, pulmonary nodules, elevated LFTs, weight loss, liver mass, and colonic mass.     1. Elevated LFTs, liver mass: Likely malignancy. No known history of liver disease. No recent ETOH use but previously ~6 drinks/week for years.  Labs on admit: Tbili 33.4, , , alk phos 489. WBC 14.5, Hgb 11.3. INR 1.09. He presented with PIYUSH with Cr 2.24, BUN 52.3, and GFR 33. Normal lipase.     CTCAP showed liver masses suggestive of malignancy and severe  intrahepatic biliary ductal dilation with cutoff at the central liver. This is redemonstrated on ultrasound  -- Discussed with biliary provider. To rule out primary hepatic mass vs multiple satellite lesions suggestive of metastases, will MRCP ordered but not yet completed, needs to be completed prior to ERCP tomorrow. Pending MRCP results, may add EUS to ERCP 6/26.    LFTs mildly decreased from yesterday. Elevation and biliary dilation likely related to metastatic disease. AFP normal. CA 19-9 and hepatitis panel pending.    Paracentesis not performed as he was not amenable for safe percutaneous drainage.  Per nursing note 6/24, it was cancelled d/t small abscess, however this was not noted on ultrasound and not noted on admission CT.     He is not a candidate for steroids as this does not appear to be alcoholic hepatitis, and also concern for infection with leukocytosis, abnormal UA w/ pending culture, and possible pulmonary infection. Consider blood cultures.    Discussed with his ex partner that the thought is malignancy but additional imaging/procedure today and tomorrow are to confirm.         2. Possible colon mass: Noted on admission CT. Previous colonoscopy in 2021 unremarkable, though on colonoscopy in 2015 had a 4mm tubular adenoma in the transverse colon. Weight loss of 40 lbs in last 4 months associated with decreased appetite. Increased constipation recently, but otherwise no alarm symptoms indicative of colon cancer. CEA normal.      He did not complete bowel prep yesterday, but was started this AM. He endorsed constipation, so we will plan to finish current bowel prep, do 2L tonight, and 2L in AM if not cleared out.  Colonoscopy tomorrow in conjunction with ERCP.         PLAN:  -CA 19-9 and acute hepatitis panel pending  -MRCP today (needs to be completed before ERCP)  -Finish current Golytely 4L prep, additional 2L today 6/25 PM  -Mag citrate contraindicated, check prep first thing in AM, PRN  golytely 2L if not cleared out  -Colonoscopy tomorrow with ERCP  -Pending MRCP may add EUS to ERCP and colonoscopy  -Colorectal surgery consult pending      33 minutes of total time was spent providing patient care, including patient evaluation, reviewing documentation/test results, and .     I will update Dr. Ramirez, GI staff  SRIRAM Hu, PA-C  [facility]  [phone]

## 2025-06-25 NOTE — PLAN OF CARE
"Pt A&O x4. On RA. PIV infusin LR at 125ml/hr. PIV CDI. Urine cultures pending. NPO. Denies pain,SOB,N/V. Son at bedside. Plan for a colonscopy today (GI prep given).       Goal Outcome Evaluation:      Plan of Care Reviewed With: patient    Overall Patient Progress: no changeOverall Patient Progress: no change    Outcome Evaluation: Plan for a colonscopy today.    Problem: Adult Inpatient Plan of Care  Goal: Plan of Care Review  Description: The Plan of Care Review/Shift note should be completed every shift.  The Outcome Evaluation is a brief statement about your assessment that the patient is improving, declining, or no change.  This information will be displayed automatically on your shift  note.  Outcome: Progressing  Flowsheets (Taken 6/25/2025 0430)  Outcome Evaluation: Plan for a colonscopy today.  Plan of Care Reviewed With: patient  Overall Patient Progress: no change  Goal: Patient-Specific Goal (Individualized)  Description: You can add care plan individualizations to a care plan. Examples of Individualization might be:  \"Parent requests to be called daily at 9am for status\", \"I have a hard time hearing out of my right ear\", or \"Do not touch me to wake me up as it startles  me\".  Outcome: Progressing  Goal: Absence of Hospital-Acquired Illness or Injury  Outcome: Progressing  Intervention: Identify and Manage Fall Risk  Recent Flowsheet Documentation  Taken 6/24/2025 2014 by Anastasia Randall RN  Safety Promotion/Fall Prevention:   activity supervised   supervised activity   safety round/check completed   nonskid shoes/slippers when out of bed   room near nurse's station  Intervention: Prevent Skin Injury  Recent Flowsheet Documentation  Taken 6/24/2025 2014 by Anastasia Randall RN  Body Position: position changed independently  Intervention: Prevent and Manage VTE (Venous Thromboembolism) Risk  Recent Flowsheet Documentation  Taken 6/24/2025 2014 by Anastasia Randall RN  VTE Prevention/Management: SCDs " off (sequential compression devices)  Intervention: Prevent Infection  Recent Flowsheet Documentation  Taken 6/24/2025 2014 by Anastasia Randall RN  Infection Prevention: rest/sleep promoted  Goal: Optimal Comfort and Wellbeing  Outcome: Progressing  Goal: Readiness for Transition of Care  Outcome: Progressing     Problem: Fall Injury Risk  Goal: Absence of Fall and Fall-Related Injury  Outcome: Progressing  Intervention: Identify and Manage Contributors  Recent Flowsheet Documentation  Taken 6/24/2025 2014 by Anastasia Randall RN  Medication Review/Management: medications reviewed  Intervention: Promote Injury-Free Environment  Recent Flowsheet Documentation  Taken 6/24/2025 2014 by Anastasia Randall RN  Safety Promotion/Fall Prevention:   activity supervised   supervised activity   safety round/check completed   nonskid shoes/slippers when out of bed   room near nurse's station     Problem: Acute Kidney Injury/Impairment  Goal: Fluid and Electrolyte Balance  Outcome: Progressing  Goal: Improved Oral Intake  Outcome: Progressing  Goal: Effective Renal Function  Outcome: Progressing  Intervention: Monitor and Support Renal Function  Recent Flowsheet Documentation  Taken 6/24/2025 2014 by Anastasia Randall RN  Medication Review/Management: medications reviewed     Problem: Liver Failure  Goal: Optimal Coping with Liver Failure  Outcome: Progressing  Goal: Absence of Bleeding  Outcome: Progressing  Goal: Fluid and Electrolyte Balance  Outcome: Progressing  Goal: Optimal Gastrointestinal Function  Outcome: Progressing  Goal: Blood Glucose Level Within Target Range  Outcome: Progressing  Goal: Hemodynamic Stability  Outcome: Progressing  Goal: Absence of Infection Signs and Symptoms  Outcome: Progressing  Goal: Optimal Neurologic Function  Outcome: Progressing  Goal: Improved Oral Intake  Outcome: Progressing  Goal: Optimal Pain Control, Comfort and Function  Outcome: Progressing  Goal: Effective Oxygenation and  Ventilation  Outcome: Progressing  Intervention: Promote Airway Secretion Clearance  Recent Flowsheet Documentation  Taken 6/24/2025 2014 by Anastasia Randall, RN  Cough And Deep Breathing: done independently per patient

## 2025-06-25 NOTE — PLAN OF CARE
"VSS afebrile. LFT's elevated, See US abd and CT. UCX no growth. Cr improving.On IVF's. Plan for MRCP this afternoon. ECRP and Colonoscopy tomorrow. Pt needs to drink the previous Golytely Prep and the second Golytely Prep as well. GI and Colorectal surgery following. K+2.9 md aware added K+ and Mag protocol. Pt now having clear/brown flec's watery stool.         Goal Outcome Evaluation:      Plan of Care Reviewed With: patient    Overall Patient Progress: no changeOverall Patient Progress: no change    Outcome Evaluation: VSS afebrile. LFT's elevated, See US abd and CT. UCX no growth. Cr improving.On IVF's. Plan for MRCP this afternoon. ECRP and Colonoscopy tomorrow. Pt needs to drink the previous Golytely Prep and the second Golytely Prep as well. GI and Surgery following.          Problem: Adult Inpatient Plan of Care  Goal: Plan of Care Review  Description: The Plan of Care Review/Shift note should be completed every shift.  The Outcome Evaluation is a brief statement about your assessment that the patient is improving, declining, or no change.  This information will be displayed automatically on your shift  note.  Outcome: Not Progressing  Flowsheets (Taken 6/25/2025 1340)  Outcome Evaluation: VSS afebrile. LFT's elevated, See US abd and CT. UCX no growth. Cr improving.On IVF's. Plan for MRCP this afternoon. ECRP and Colonoscopy tomorrow. Pt needs to drink the previous Golytely Prep and the second Golytely Prep as well. GI and Surgery following.  Plan of Care Reviewed With: patient  Overall Patient Progress: no change  Goal: Patient-Specific Goal (Individualized)  Description: You can add care plan individualizations to a care plan. Examples of Individualization might be:  \"Parent requests to be called daily at 9am for status\", \"I have a hard time hearing out of my right ear\", or \"Do not touch me to wake me up as it startles  me\".  Outcome: Not Progressing  Goal: Absence of Hospital-Acquired Illness or " Injury  Outcome: Not Progressing  Goal: Optimal Comfort and Wellbeing  Outcome: Not Progressing  Goal: Readiness for Transition of Care  Outcome: Not Progressing     Problem: Fall Injury Risk  Goal: Absence of Fall and Fall-Related Injury  Outcome: Not Progressing     Problem: Acute Kidney Injury/Impairment  Goal: Fluid and Electrolyte Balance  Outcome: Not Progressing  Goal: Improved Oral Intake  Outcome: Not Progressing  Goal: Effective Renal Function  Outcome: Not Progressing     Problem: Liver Failure  Goal: Optimal Coping with Liver Failure  Outcome: Not Progressing  Goal: Absence of Bleeding  Outcome: Not Progressing  Goal: Fluid and Electrolyte Balance  Outcome: Not Progressing  Goal: Optimal Gastrointestinal Function  Outcome: Not Progressing  Goal: Blood Glucose Level Within Target Range  Outcome: Not Progressing  Goal: Hemodynamic Stability  Outcome: Not Progressing  Goal: Absence of Infection Signs and Symptoms  Outcome: Not Progressing  Goal: Optimal Neurologic Function  Outcome: Not Progressing  Goal: Improved Oral Intake  Outcome: Not Progressing  Goal: Optimal Pain Control, Comfort and Function  Outcome: Not Progressing  Goal: Effective Oxygenation and Ventilation  Outcome: Not Progressing

## 2025-06-25 NOTE — PROGRESS NOTES
[facility]    Medicine Progress Note - Hospitalist Service    Date of Admission:  6/24/2025    Assessment & Plan   Zoltan Dalton is a 60 year old male with PMH of tobacco use disorder, who was admitted on 6/24/2025 with abdominal pain.    Presumed hepatic malignancy  Multiple liver lesions and intrahepatic biliary ductal dilatation  Early satiety  40lb weight loss  Epigastric and abdominal pain  Failure to thrive  Presents with months of above symptoms. Does not have a PCP or interact with the healthcare system often. Hx of ~6 drinks/week for years.   Afebrile and hemodynamically stable on arrival. Labs notable for tbili 33.4, alk phos 489, AST//140, Cr 2.24, WBC 14.5. Abs US showed moderate to severe intrahepatic biliary ductal dilatation in L hepatic lobe, common duct dilated to 12mm, and heterogenous liver with multiple indeterminate hypoechoic lesions. CT CAP with findings worrisome for liver malignancy- hepatic masses, severe L>R intrahepatic biliary dilatation; indeterminate focal region of wall thickening and decompression at mid ascending colon, considered colonic mass until proven otherwise; several indeterminate pulmonary nodules bilaterally, and small ascites.   Paracentesis unable to be completed 2/2 small volume ascites. AFP<1.8, CA 19-9 pending, CEA 43.1  - GI consulted  - MRCP   - ERCP and colonoscopy 6/26; +/- EUS pending MRCP results  - Hepatitis panel pending    Colonic mass  Previous colonoscopy '21 unremarkable, though had 4mm tubular adenoma in transverse colon on colonoscopy '15.   - GI planning colonoscopy  - CRS consult    Transaminitis   Likely related to above.   - Trend LFTs    PIYUSH  Cr 2.24 from unclear baseline. Likely pre-renal given improvement with IVF.   - IVF   - Avoid nephrotoxins  - AM BMP    Bilateral pulmonary nodules  Possible metastatic disease vs infectious or inflammatory.   - Follow up CT chest in 3 months pending above workup    Tobacco use  disorder - daily smoker, encourage cessation,           Diet: NPO for Procedure/Surgery per Anesthesia Guidelines Except for: Meds; Clear liquids before procedure/surgery: ADULT (Age GREATER than or Equal to 18 years) - Clear liquids 2 hours before procedure/surgery    DVT Prophylaxis: Pneumatic Compression Devices  Maki Catheter: Not present  Lines: None     Cardiac Monitoring: None  Code Status: Full Code      Clinically Significant Risk Factors Present on Admission         # Hyponatremia: Lowest Na = 134 mmol/L in last 2 days, will monitor as appropriate  # Hypochloremia: Lowest Cl = 97 mmol/L in last 2 days, will monitor as appropriate   # Hypercalcemia: Highest Ca = 12 mg/dL in last 2 days, will monitor as appropriate    # Hypoalbuminemia: Lowest albumin = 3 g/dL at 6/24/2025  6:42 AM, will monitor as appropriate                          Social Drivers of Health    Tobacco Use: Medium Risk (4/6/2021)    Patient History     Smoking Tobacco Use: Former     Smokeless Tobacco Use: Never   Alcohol Use: Unknown (3/4/2021)    AUDIT-C     Frequency of Alcohol Consumption: 2-4 times a month     Average Number of Drinks: Not asked     Frequency of Binge Drinking: Not asked          Disposition Plan     Medically Ready for Discharge: Anticipated in 2-4 Days             Jane Rhodes,   Hospitalist Service  [facility]  Securely message with [employer] (more info)  Text page via [employer] Paging/Directory   ______________________________________________________________________    Interval History   No acute overnight events. Feels ok today. No abdominal pain. Very thirsty. Doing colonoscopy prep for tomorrow. MRCP today.     Physical Exam   Vital Signs: Temp: 97.6  F (36.4  C) Temp src: Oral BP: 132/54 Pulse: 70   Resp: 18 SpO2: 98 % O2 Device: None (Room air)    Weight: 124 lbs 4.8 oz    Constitutional: Awake, alert, no distress, and cooperative  Cardiovascular: Regular rate and rhythm, normal S1  and S2, no S3 or S4, and no murmur noted  Respiratory: No increased work of breathing, good air exchange, clear to auscultation bilaterally, no crackles or wheezing  Gastrointestinal: Abdomen soft, non-tender, non-distended. BS normal. No masses, organomegaly     Medical Decision Making       45 MINUTES SPENT BY ME on the date of service doing chart review, history, exam, documentation & further activities per the note.      Data     I have personally reviewed the following data over the past 24 hrs:    12.5 (H)  \   10.5 (L)   / 309     139 106 51.4 (H) /  88   2.9 (L) 23 1.50 (H) \     ALT: 115 (H); 115 (H) AST: 118 (H); 118 (H) AP: 410 (H); 410 (H) TBILI: 26.6 (HH); 26.6 (HH)   ALB: 2.2 (L); 2.2 (L) TOT PROTEIN: 4.7 (L); 4.7 (L) LIPASE: N/A     TSH: N/A T4: N/A A1C: N/A     Procal: N/A CRP: N/A Lactic Acid: 1.0       INR:  N/A PTT:  N/A   D-dimer:  N/A Fibrinogen:  N/A

## 2025-06-25 NOTE — CONSULTS
[facility]  Colon and Rectal Surgery Consult Note  Name: Zoltan Dalton    MRN: [id number]  YOB: 1965    Age: 60 year old  Date of admission: 6/24/2025  Primary care provider: Maris Ref-Primary, Physician     Requesting Physician: Lisa Sneed PA-C   Reason for consult:  Possible colon mass           History of Present Illness:   Zoltan Dalton is a 60 year old male with a history of tobacco use, seen at the request of Lisa Sneed PA-C, presents with abdominal pain. He reports that over the last several months he has had persistent epigastric pain  as well as some lower abdominal pain. This also became associated with a poor appetite, fatigue,  40 lb weight loss, and dizziness. Given all of his symptoms, he presents to the ER for further evaluation. In the ER, he was afebrile, tachycardic to 102, and blood pressure 130/50.  His labs were remarkable for sodium 134, creatinine 2.24, calcium 12, albumin 3, total protein 5.9, alk-phos 49, , , total bilirubin 33.4, total , WBC 14.5, hemoglobin 11.3, platelets 227 , INR 1.09, PT 14.2, PTT 26.  A CT scan of the chest abdomen and pelvis with contrast revealed indeterminate focal wall thickening and decompression of the mid ascending colon  concerning for a potential colonic mass, hepatic masses primarily of the right liver concerning for malignancy, severe left intrahepatic biliary ductal dilatation, several indeterminate pulmonary nodules could represent metastatic disease versus atypical infectious or inflammatory etiology, and small amount of ascites.  Given these findings, he was admitted for further evaluation and management.  GI and CRS were consulted. He was scheduled for a paracentesis today but this was cancelled as it was thought to be unsafe for percutaneous drainage. GI is planning for colonoscopy tomorrow in conjunction with an ERCP.    Today, the patient is resting in bed comfortably but fatigued.  Tolerating the bowel prep and having looser stools. No nausea, vomiting or abdominal pain reported. On average since feeling sick, he has a firm bowel movement every 3 days without bleeding. No recent bleeding.     Colonoscopy History:  : Normal. 5 year recall.    Surgical History: No prior abdominal surgery             Past Medical History:   No past medical history on file.          Past Surgical History:     Past Surgical History:   Procedure Laterality Date    COLONOSCOPY      COLONOSCOPY N/A 2021    Procedure: COLONOSCOPY (fv);  Surgeon: Ambrose Vargas MD;  Location:  GI               Social History:     Social History     Tobacco Use    Smoking status: Former     Current packs/day: 0.00     Average packs/day: 1 pack/day for 41.0 years (41.0 ttl pk-yrs)     Types: Cigarettes     Start date:      Quit date: 2020     Years since quittin.5    Smokeless tobacco: Never   Substance Use Topics    Alcohol use: Yes     Comment: 6 per week             Family History:     Family History   Problem Relation Age of Onset    Colon Cancer No family hx of              Allergies:   No Known Allergies          Medications:     Current Facility-Administered Medications   Medication Dose Route Frequency Provider Last Rate Last Admin    albumin human 25 % injection 50 g  50 g Intravenous Once Lisa Sneed PA-C        lidocaine 1 % 1-30 mL  1-30 mL Subcutaneous Once Elio Rivers MD        senna-docusate (SENOKOT-S/PERICOLACE) 8.6-50 MG per tablet 1 tablet  1 tablet Oral BID Ham Nuñez APRN CNP        Or    senna-docusate (SENOKOT-S/PERICOLACE) 8.6-50 MG per tablet 2 tablet  2 tablet Oral BID Ham Nuñez APRN CNP   2 tablet at 25    sodium chloride (PF) 0.9% PF flush 3 mL  3 mL Intracatheter Q8H Ham Conway APRN CNP        sodium chloride (PF) 0.9% PF flush 3 mL  3 mL Intracatheter Q8H Melyssa Obrien APRN CNP   3 mL at 25 145             Review of  "Systems:   A comprehensive greater than 10 system review of systems was carried out.  Pertinent positives and negatives are noted above.  Otherwise negative for contributory info.            Physical Exam:     Blood pressure 132/54, pulse 70, temperature 97.6  F (36.4  C), temperature source Oral, resp. rate 18, height 1.727 m (5' 8\"), weight 56.4 kg (124 lb 4.8 oz), SpO2 98%.  No intake or output data in the 24 hours ending 06/25/25 1141  EXAM:  GEN: Awake alert and oriented, appears stated age.  PULM: Non-labored breathing with normal respiratory effort  CVS: reg rate and rhythm, no peripheral edema  ABD: Soft, non tender, non distended. No rebound or guarding   RECTAL: Rectal exam was deferred  NEURO: CN II-XII grossly intact  MSK: extremeties with no clubbing, cyanosis or edema; able to ambulate  PSYCH: responsive, alert, cooperative; oriented x3; appropriate mood and affect  EXT/SKIN: inspection reveals no rashes, lesions or ulcers         Data Reviewed:     Results for orders placed or performed during the hospital encounter of 06/24/25   US Abdomen Limited (RUQ)    Narrative    EXAM: US ABDOMEN LIMITED  LOCATION: [facility]  DATE: 6/24/2025    INDICATION: elevated bili, pain with eating  COMPARISON: Concomitant CT of the abdomen and pelvis  TECHNIQUE: Limited abdominal ultrasound.    FINDINGS:    GALLBLADDER: Contracted gallbladder. Sonographic Lozano sign is negative. No gallbladder wall thickening or pericholecystic fluid. Limited evaluation for gallstones due to contracted bladder.    BILE DUCTS: Moderate to severe intrahepatic biliary ductal dilatation in the left hepatic lobe. The common duct is visualized only at the hepatic hilum where it is dilated to 12 mm..    LIVER: Heterogeneous liver echotexture. Indeterminate ill-defined hypoechoic lesion in the right hepatic lobe measuring 1.3 cm and ill-defined hypoechoic lesion in the left hepatic lobe measuring 2.1 cm. There are " several other masses in the liver seen   on the noncontrast CT today which are not seen well by ultrasound. Small cyst in the right hepatic lobe measuring 0.9 cm.. The portal vein is patent with flow in the normal direction.    RIGHT KIDNEY: No hydronephrosis.    PANCREAS: The pancreas is largely obscured by overlying gas.    Small ascites.      Impression    IMPRESSION:  1.  Moderate to severe intrahepatic biliary ductal dilatation in the left hepatic lobe. The common duct is only visualized at the hepatic hilum where it is dilated to 12 mm. Consider MRCP for further evaluation.  2.  Heterogeneous liver with multiple indeterminate hypoechoic lesions in the liver. The lesions are better seen on the noncontrast CT performed today. These are indeterminate but suspicious for malignancy. These can also be evaluated on the MRCP.  3.  Small ascites.       CT Chest Abdomen Pelvis w/o Contrast    Narrative    EXAM: CT CHEST ABDOMEN PELVIS W/O CONTRAST  LOCATION: [facility]  DATE: 6/24/2025    INDICATION: Weight loss, fatigue, pain with eating.  COMPARISON: None.  TECHNIQUE: CT scan of the chest, abdomen, and pelvis was performed without IV contrast. Multiplanar reformats were obtained. Dose reduction techniques were used.   CONTRAST: None.    FINDINGS:   LUNGS AND PLEURA: No effusions or pneumothorax. No lobar consolidation identified. There are multiple pulmonary nodules noted bilaterally. Grouping of groundglass nodularity noted at the right lower lobe, for example series 4 image 186. A focal example   at the right lower lobe is 8 x 5 mm, series 4 image 205. A rounded solid irregular left upper lobe nodule is 5 mm image 58. Nodule example along the medial inferior right upper lobe along the minor fissure is 14 x 9 mm image 172. There are other nodule   examples. Calcified granulomas at the right lower lobe noted.    MEDIASTINUM/AXILLAE: Assessment limited by low mediastinal fat. No conspicuous  adenopathy or acute mediastinal abnormality identified.    CORONARY ARTERY CALCIFICATION: Mild.    HEPATOBILIARY: Diffusely heterogeneous liver is identified worrisome for underlying neoplasm. Ill-defined hypodensity at the posterior right liver is 4.5 cm series 3 image 125. A few peripheral small hypodense nodules noted at the central liver image   140, posterior right liver image 147. There is severe left greater than the right intrahepatic biliary ductal dilatation. This ductal dilatation becomes cut off along the medial central and right liver around image 130. No conspicuous extrahepatic   biliary ductal dilatation can be visualized. Contracted gallbladder. Tiny gallstones.    PANCREAS: Unenhanced pancreas shows no conspicuous acute abnormality. This assessment is limited in assessment for mass.     SPLEEN: Mildly heterogeneous spleen. No splenic enlargement.    ADRENAL GLANDS: Bilateral adrenal thickening left greater than right. On left side this is 1.3 cm, and on the right this is approximately 0.8 cm.    KIDNEYS/BLADDER: No hydronephrosis. No visible focal renal abnormality within the limits of unenhanced scanning. No stones. Bladder shows diffuse mild wall thickening that may just relate to incomplete distention.    BOWEL: Moderate stool within the colon and rectum. A few small bowel loops are mildly distended with fluid and gas. There is nonspecific circumferential apparent wall thickening and decompression at the mid ascending colon along the right flank. This is   approximately 3.6 cm series 3 image 197.    LYMPH NODES: A few mildly prominent upper abdominal lymph nodes suggested. One example is 8 mm at the gastrohepatic region image 132. Lymph nodes are difficult to assess given the low abdominal fat. There are adjacent small examples versus vascular   varices.    VASCULATURE: Suggestion of a few vascular varices. Mild aortic calcifications.    PELVIC ORGANS: Small ascites within the abdomen and  pelvis. Prominent prostate measuring 4.9 cm. Mildly high position of the left testicle.    MUSCULOSKELETAL: Diffuse degenerative changes throughout the spine. Rounded lucency along the inferior endplate of L2 on series 8 image 66 could just be a Schmorl's node. Indeterminate patchy mild sclerosis along the left ischial tuberosity series 3   image 299. Mild anasarca.      Impression    IMPRESSION:  1.  Findings worrisome for malignancy within the liver. The liver appears heterogeneous with suggestion of underlying hepatic masses primarily at the right liver. There is severe left worse than right intrahepatic biliary ductal dilatation with cutoff of   the biliary tree at the central liver. Recommend correlation with hepatic MRI and MRCP.  2.  Indeterminate focal region of wall thickening and decompression at the mid ascending colon. This should be considered a colonic mass until proven otherwise. Recommend correlation with colonoscopy.  3.  Several indeterminate pulmonary nodules bilaterally. Metastatic disease is possible. This could also relate to an atypical infectious or inflammatory etiology. Recommend follow-up short interval CT chest in 3 months.  4.  Small ascites. A few borderline prominent upper abdominal lymph nodes are suggested but these are limited in view.  5.  Technically indeterminate small sclerosis at the left ischial tuberosity.  6.  Mild anasarca.   US Paracentesis with Albumin    Narrative    EXAM: LIMITED ABDOMINAL ULTRASOUND  LOCATION: [facility]  DATE: 6/24/2025    INDICATION: Ascites of indeterminate etiology.    FINDINGS: Limited abdominal ultrasound demonstrates trace volume intraperitoneal ascites, not amenable for safe percutaneous drainage. No paracentesis performed.       Recent Labs   Lab 06/24/25  0642   WBC 14.5*   HGB 11.3*   HCT 30.7*   MCV 91        Recent Labs   Lab 06/25/25  0711 06/24/25  0642    134*   POTASSIUM 2.9* 3.6   CHLORIDE 106 97*    CO2 23 24   ANIONGAP 10 13   GLC 88 107*   BUN 51.4* 52.3*   CR 1.50* 2.24*   GFRESTIMATED 53* 33*   SANDRA 10.8* 12.0*   PROTTOTAL 4.7*  4.7* 5.9*   ALBUMIN 2.2*  2.2* 3.0*   BILITOTAL 26.6*  26.6* 33.4*   ALKPHOS 410*  410* 489*   *  118* 135*   *  115* 140*     Recent Labs   Lab 06/24/25  0642   INR 1.09     Recent Labs   Lab 06/24/25  1628   LACT 1.0     Recent Labs   Lab 06/24/25  0833   COLOR Dark Yellow*   APPEARANCE Slightly Cloudy*   URINEGLC Negative   URINEBILI Large*   URINEKETONE Trace*   SG 1.015   UBLD Moderate*   URINEPH 5.5   PROTEIN 10*   NITRITE Negative   LEUKEST Negative   RBCU 3*   WBCU 17*         Assessment and Plan:   Zoltan Dalton is a 60 year old male with a history of tobacco use, seen at the request of Lisa Sneed PA-C, presents with several months of persistent epigastric pain, lower abdominal pain, a poor appetite, fatigue, 40 lb weight loss, and dizziness. A CT C/A/P with contrast revealed focal wall thickening and decompression of the mid ascending colon concerning for a potential colonic mass, hepatic masses primarily of the right liver concerning for malignancy, severe left intrahepatic biliary ductal dilatation, several indeterminate pulmonary nodules could represent metastatic disease. He has elevated LFTs. CEA normal. Abdominal exam benign. He remains hemodynamically stable so no emergent surgery is indicated at this time. Agree with a colonoscopy to evaluate the colonic mass on imaging. Will await the pathology from the biopsies to confirm diagnosis. Given the likelihood of metastatic disease, upfront surgery is typically not recommended unless the patient is or becomes obstructive or symptomatically anemic. With that being said, would likely recommend an oncology consult for consideration of chemotherapy pending pathology and further workup.     Plan:  Surgery: No emergent surgery indicated  Diet: NPO for colonoscopy  IV Fluids: Per  hospitalist  Antibiotics: Not indicated from CRS perspective  Medications: Continue home meds per hospitalist  I&O s:  strict I&O s  Labs:   - Reviewed  - Ordered: None   Imaging:   - Dr. Cody and myself have personally viewed: CT abd/pelvis  - Ordered: None  Activity: ambulate as tolerated, encourage OOB  DVT prophylaxis: SCD s  This plan has been discussed with Dr. Cody    Patient specific identified risk factors considered as part of today s evaluation include: tobacco use      Additional history obtained from the chart and patient.     Level of MDM: Moderate        Sindy Campbell PA-C  [facility]  Phone:  [phone]

## 2025-06-26 ENCOUNTER — APPOINTMENT (OUTPATIENT)
Dept: GENERAL RADIOLOGY | Facility: CLINIC | Age: 60
End: 2025-06-26

## 2025-06-26 ENCOUNTER — ANESTHESIA EVENT (OUTPATIENT)
Dept: SURGERY | Facility: CLINIC | Age: 60
End: 2025-06-26

## 2025-06-26 ENCOUNTER — ANESTHESIA (OUTPATIENT)
Dept: SURGERY | Facility: CLINIC | Age: 60
End: 2025-06-26

## 2025-06-26 VITALS
DIASTOLIC BLOOD PRESSURE: 48 MMHG | SYSTOLIC BLOOD PRESSURE: 101 MMHG | WEIGHT: 124.3 LBS | HEIGHT: 68 IN | TEMPERATURE: 97.6 F | HEART RATE: 72 BPM | RESPIRATION RATE: 18 BRPM | BODY MASS INDEX: 18.84 KG/M2 | OXYGEN SATURATION: 97 %

## 2025-06-26 LAB
ALBUMIN SERPL BCG-MCNC: 2.2 G/DL (ref 3.5–5.2)
ALP SERPL-CCNC: 391 U/L (ref 40–150)
ALT SERPL W P-5'-P-CCNC: 110 U/L (ref 0–70)
ANION GAP SERPL CALCULATED.3IONS-SCNC: 8 MMOL/L (ref 7–15)
AST SERPL W P-5'-P-CCNC: 110 U/L (ref 0–45)
BILIRUB SERPL-MCNC: 25.4 MG/DL
BUN SERPL-MCNC: 44.8 MG/DL (ref 8–23)
CALCIUM SERPL-MCNC: 10.9 MG/DL (ref 8.8–10.4)
CANCER AG19-9 SERPL IA-ACNC: 31 U/ML
CHLORIDE SERPL-SCNC: 109 MMOL/L (ref 98–107)
COLONOSCOPY: NORMAL
CREAT SERPL-MCNC: 1.42 MG/DL (ref 0.67–1.17)
EGFRCR SERPLBLD CKD-EPI 2021: 56 ML/MIN/1.73M2
ERCP: NORMAL
ERYTHROCYTE [DISTWIDTH] IN BLOOD BY AUTOMATED COUNT: 23.7 % (ref 10–15)
GLUCOSE BLDC GLUCOMTR-MCNC: 73 MG/DL (ref 70–99)
GLUCOSE SERPL-MCNC: 91 MG/DL (ref 70–99)
HCO3 SERPL-SCNC: 23 MMOL/L (ref 22–29)
HCT VFR BLD AUTO: 27.3 % (ref 40–53)
HGB BLD-MCNC: 10.2 G/DL (ref 13.3–17.7)
INR PPP: 1.14 (ref 0.85–1.15)
MAGNESIUM SERPL-MCNC: 2.4 MG/DL (ref 1.7–2.3)
MCH RBC QN AUTO: 33.9 PG (ref 26.5–33)
MCHC RBC AUTO-ENTMCNC: 37.4 G/DL (ref 31.5–36.5)
MCV RBC AUTO: 91 FL (ref 78–100)
PHOSPHATE SERPL-MCNC: 2.3 MG/DL (ref 2.5–4.5)
PLATELET # BLD AUTO: 284 10E3/UL (ref 150–450)
POTASSIUM SERPL-SCNC: 3.6 MMOL/L (ref 3.4–5.3)
POTASSIUM SERPL-SCNC: 3.6 MMOL/L (ref 3.4–5.3)
PROT SERPL-MCNC: 4.4 G/DL (ref 6.4–8.3)
PROTHROMBIN TIME: 14.7 SECONDS (ref 11.8–14.8)
RBC # BLD AUTO: 3.01 10E6/UL (ref 4.4–5.9)
SODIUM SERPL-SCNC: 140 MMOL/L (ref 135–145)
UPPER EUS: NORMAL
WBC # BLD AUTO: 12.8 10E3/UL (ref 4–11)

## 2025-06-26 PROCEDURE — 85018 HEMOGLOBIN: CPT | Performed by: STUDENT IN AN ORGANIZED HEALTH CARE EDUCATION/TRAINING PROGRAM

## 2025-06-26 PROCEDURE — 0F7D8DZ DILATION OF PANCREATIC DUCT WITH INTRALUMINAL DEVICE, VIA NATURAL OR ARTIFICIAL OPENING ENDOSCOPIC: ICD-10-PCS | Performed by: INTERNAL MEDICINE

## 2025-06-26 PROCEDURE — 250N000011 HC RX IP 250 OP 636: Performed by: NURSE PRACTITIONER

## 2025-06-26 PROCEDURE — 88305 TISSUE EXAM BY PATHOLOGIST: CPT | Mod: 26 | Performed by: PATHOLOGY

## 2025-06-26 PROCEDURE — 85610 PROTHROMBIN TIME: CPT | Performed by: STUDENT IN AN ORGANIZED HEALTH CARE EDUCATION/TRAINING PROGRAM

## 2025-06-26 PROCEDURE — 258N000003 HC RX IP 258 OP 636

## 2025-06-26 PROCEDURE — 84100 ASSAY OF PHOSPHORUS: CPT | Performed by: STUDENT IN AN ORGANIZED HEALTH CARE EDUCATION/TRAINING PROGRAM

## 2025-06-26 PROCEDURE — C2617 STENT, NON-COR, TEM W/O DEL: HCPCS | Performed by: INTERNAL MEDICINE

## 2025-06-26 PROCEDURE — 370N000017 HC ANESTHESIA TECHNICAL FEE, PER MIN: Performed by: INTERNAL MEDICINE

## 2025-06-26 PROCEDURE — 255N000002 HC RX 255 OP 636: Performed by: INTERNAL MEDICINE

## 2025-06-26 PROCEDURE — 83735 ASSAY OF MAGNESIUM: CPT | Performed by: STUDENT IN AN ORGANIZED HEALTH CARE EDUCATION/TRAINING PROGRAM

## 2025-06-26 PROCEDURE — C1769 GUIDE WIRE: HCPCS | Performed by: INTERNAL MEDICINE

## 2025-06-26 PROCEDURE — 272N000001 HC OR GENERAL SUPPLY STERILE: Performed by: INTERNAL MEDICINE

## 2025-06-26 PROCEDURE — 120N000001 HC R&B MED SURG/OB

## 2025-06-26 PROCEDURE — 250N000009 HC RX 250: Performed by: INTERNAL MEDICINE

## 2025-06-26 PROCEDURE — 88173 CYTOPATH EVAL FNA REPORT: CPT | Mod: 26 | Performed by: PATHOLOGY

## 2025-06-26 PROCEDURE — 99232 SBSQ HOSP IP/OBS MODERATE 35: CPT | Performed by: STUDENT IN AN ORGANIZED HEALTH CARE EDUCATION/TRAINING PROGRAM

## 2025-06-26 PROCEDURE — 272N000002 HC OR SUPPLY OTHER OPNP: Performed by: INTERNAL MEDICINE

## 2025-06-26 PROCEDURE — 250N000009 HC RX 250

## 2025-06-26 PROCEDURE — 258N000003 HC RX IP 258 OP 636: Performed by: NURSE PRACTITIONER

## 2025-06-26 PROCEDURE — 999N000141 HC STATISTIC PRE-PROCEDURE NURSING ASSESSMENT: Performed by: INTERNAL MEDICINE

## 2025-06-26 PROCEDURE — 88172 CYTP DX EVAL FNA 1ST EA SITE: CPT | Mod: 26 | Performed by: PATHOLOGY

## 2025-06-26 PROCEDURE — 0FD04ZX EXTRACTION OF LIVER, PERCUTANEOUS ENDOSCOPIC APPROACH, DIAGNOSTIC: ICD-10-PCS | Performed by: INTERNAL MEDICINE

## 2025-06-26 PROCEDURE — 250N000025 HC SEVOFLURANE, PER MIN: Performed by: INTERNAL MEDICINE

## 2025-06-26 PROCEDURE — 74330 X-RAY BILE/PANC ENDOSCOPY: CPT

## 2025-06-26 PROCEDURE — 0DJD8ZZ INSPECTION OF LOWER INTESTINAL TRACT, VIA NATURAL OR ARTIFICIAL OPENING ENDOSCOPIC: ICD-10-PCS | Performed by: INTERNAL MEDICINE

## 2025-06-26 PROCEDURE — 250N000013 HC RX MED GY IP 250 OP 250 PS 637: Performed by: STUDENT IN AN ORGANIZED HEALTH CARE EDUCATION/TRAINING PROGRAM

## 2025-06-26 PROCEDURE — 250N000009 HC RX 250: Performed by: NURSE ANESTHETIST, CERTIFIED REGISTERED

## 2025-06-26 PROCEDURE — C1889 IMPLANT/INSERT DEVICE, NOC: HCPCS | Performed by: INTERNAL MEDICINE

## 2025-06-26 PROCEDURE — P9045 ALBUMIN (HUMAN), 5%, 250 ML: HCPCS | Mod: JZ

## 2025-06-26 PROCEDURE — 88173 CYTOPATH EVAL FNA REPORT: CPT | Mod: TC | Performed by: INTERNAL MEDICINE

## 2025-06-26 PROCEDURE — 36415 COLL VENOUS BLD VENIPUNCTURE: CPT | Performed by: STUDENT IN AN ORGANIZED HEALTH CARE EDUCATION/TRAINING PROGRAM

## 2025-06-26 PROCEDURE — 0DJ08ZZ INSPECTION OF UPPER INTESTINAL TRACT, VIA NATURAL OR ARTIFICIAL OPENING ENDOSCOPIC: ICD-10-PCS | Performed by: INTERNAL MEDICINE

## 2025-06-26 PROCEDURE — 710N000009 HC RECOVERY PHASE 1, LEVEL 1, PER MIN: Performed by: INTERNAL MEDICINE

## 2025-06-26 PROCEDURE — 250N000011 HC RX IP 250 OP 636: Mod: JZ

## 2025-06-26 PROCEDURE — 80053 COMPREHEN METABOLIC PANEL: CPT | Performed by: STUDENT IN AN ORGANIZED HEALTH CARE EDUCATION/TRAINING PROGRAM

## 2025-06-26 PROCEDURE — 84132 ASSAY OF SERUM POTASSIUM: CPT | Performed by: STUDENT IN AN ORGANIZED HEALTH CARE EDUCATION/TRAINING PROGRAM

## 2025-06-26 PROCEDURE — 360N000083 HC SURGERY LEVEL 3 W/ FLUORO, PER MIN: Performed by: INTERNAL MEDICINE

## 2025-06-26 DEVICE — COTTON-LEUNG BILIARY STENT
Type: IMPLANTABLE DEVICE | Site: BILE DUCT | Status: NON-FUNCTIONAL
Brand: COTTON-LEUNG
Removed: 2025-07-02

## 2025-06-26 RX ORDER — SODIUM CHLORIDE, SODIUM LACTATE, POTASSIUM CHLORIDE, CALCIUM CHLORIDE 600; 310; 30; 20 MG/100ML; MG/100ML; MG/100ML; MG/100ML
INJECTION, SOLUTION INTRAVENOUS CONTINUOUS PRN
Status: DISCONTINUED | OUTPATIENT
Start: 2025-06-26 | End: 2025-06-26

## 2025-06-26 RX ORDER — PROPOFOL 10 MG/ML
INJECTION, EMULSION INTRAVENOUS PRN
Status: DISCONTINUED | OUTPATIENT
Start: 2025-06-26 | End: 2025-06-26

## 2025-06-26 RX ORDER — VASOPRESSIN IN 0.9 % NACL 2 UNIT/2ML
SYRINGE (ML) INTRAVENOUS PRN
Status: DISCONTINUED | OUTPATIENT
Start: 2025-06-26 | End: 2025-06-26

## 2025-06-26 RX ORDER — LIDOCAINE 40 MG/G
CREAM TOPICAL
Status: DISCONTINUED | OUTPATIENT
Start: 2025-06-26 | End: 2025-06-26 | Stop reason: HOSPADM

## 2025-06-26 RX ORDER — DEXAMETHASONE SODIUM PHOSPHATE 4 MG/ML
4 INJECTION, SOLUTION INTRA-ARTICULAR; INTRALESIONAL; INTRAMUSCULAR; INTRAVENOUS; SOFT TISSUE
Status: DISCONTINUED | OUTPATIENT
Start: 2025-06-26 | End: 2025-06-26

## 2025-06-26 RX ORDER — ONDANSETRON 2 MG/ML
4 INJECTION INTRAMUSCULAR; INTRAVENOUS EVERY 6 HOURS PRN
Status: DISCONTINUED | OUTPATIENT
Start: 2025-06-26 | End: 2025-06-26

## 2025-06-26 RX ORDER — ACETAMINOPHEN 325 MG/1
975 TABLET ORAL ONCE
Status: COMPLETED | OUTPATIENT
Start: 2025-06-26 | End: 2025-06-26

## 2025-06-26 RX ORDER — DEXAMETHASONE SODIUM PHOSPHATE 4 MG/ML
INJECTION, SOLUTION INTRA-ARTICULAR; INTRALESIONAL; INTRAMUSCULAR; INTRAVENOUS; SOFT TISSUE PRN
Status: DISCONTINUED | OUTPATIENT
Start: 2025-06-26 | End: 2025-06-26

## 2025-06-26 RX ORDER — ONDANSETRON 4 MG/1
4 TABLET, ORALLY DISINTEGRATING ORAL EVERY 6 HOURS PRN
Status: DISCONTINUED | OUTPATIENT
Start: 2025-06-26 | End: 2025-06-26

## 2025-06-26 RX ORDER — GLYCOPYRROLATE 0.2 MG/ML
INJECTION, SOLUTION INTRAMUSCULAR; INTRAVENOUS PRN
Status: DISCONTINUED | OUTPATIENT
Start: 2025-06-26 | End: 2025-06-26

## 2025-06-26 RX ORDER — LABETALOL HYDROCHLORIDE 5 MG/ML
10 INJECTION, SOLUTION INTRAVENOUS EVERY 10 MIN PRN
Status: DISCONTINUED | OUTPATIENT
Start: 2025-06-26 | End: 2025-06-26

## 2025-06-26 RX ORDER — ONDANSETRON 4 MG/1
4 TABLET, ORALLY DISINTEGRATING ORAL EVERY 30 MIN PRN
Status: DISCONTINUED | OUTPATIENT
Start: 2025-06-26 | End: 2025-06-26

## 2025-06-26 RX ORDER — ALBUTEROL SULFATE 0.83 MG/ML
2.5 SOLUTION RESPIRATORY (INHALATION) EVERY 4 HOURS PRN
Status: DISCONTINUED | OUTPATIENT
Start: 2025-06-26 | End: 2025-06-26

## 2025-06-26 RX ORDER — FENTANYL CITRATE 50 UG/ML
INJECTION, SOLUTION INTRAMUSCULAR; INTRAVENOUS PRN
Status: DISCONTINUED | OUTPATIENT
Start: 2025-06-26 | End: 2025-06-26

## 2025-06-26 RX ORDER — MEPERIDINE HYDROCHLORIDE 25 MG/ML
12.5 INJECTION INTRAMUSCULAR; INTRAVENOUS; SUBCUTANEOUS EVERY 5 MIN PRN
Status: DISCONTINUED | OUTPATIENT
Start: 2025-06-26 | End: 2025-06-26

## 2025-06-26 RX ORDER — SODIUM CHLORIDE, SODIUM LACTATE, POTASSIUM CHLORIDE, CALCIUM CHLORIDE 600; 310; 30; 20 MG/100ML; MG/100ML; MG/100ML; MG/100ML
INJECTION, SOLUTION INTRAVENOUS CONTINUOUS
Status: DISCONTINUED | OUTPATIENT
Start: 2025-06-26 | End: 2025-06-26 | Stop reason: HOSPADM

## 2025-06-26 RX ORDER — FLUMAZENIL 0.1 MG/ML
0.2 INJECTION, SOLUTION INTRAVENOUS
Status: ACTIVE | OUTPATIENT
Start: 2025-06-26 | End: 2025-06-27

## 2025-06-26 RX ORDER — FENTANYL CITRATE 50 UG/ML
50 INJECTION, SOLUTION INTRAMUSCULAR; INTRAVENOUS EVERY 5 MIN PRN
Status: DISCONTINUED | OUTPATIENT
Start: 2025-06-26 | End: 2025-06-26

## 2025-06-26 RX ORDER — ONDANSETRON 2 MG/ML
4 INJECTION INTRAMUSCULAR; INTRAVENOUS EVERY 30 MIN PRN
Status: DISCONTINUED | OUTPATIENT
Start: 2025-06-26 | End: 2025-06-26

## 2025-06-26 RX ORDER — HYDROMORPHONE HCL IN WATER/PF 6 MG/30 ML
0.4 PATIENT CONTROLLED ANALGESIA SYRINGE INTRAVENOUS EVERY 5 MIN PRN
Status: DISCONTINUED | OUTPATIENT
Start: 2025-06-26 | End: 2025-06-26

## 2025-06-26 RX ORDER — SODIUM CHLORIDE, SODIUM LACTATE, POTASSIUM CHLORIDE, CALCIUM CHLORIDE 600; 310; 30; 20 MG/100ML; MG/100ML; MG/100ML; MG/100ML
INJECTION, SOLUTION INTRAVENOUS CONTINUOUS
Status: DISCONTINUED | OUTPATIENT
Start: 2025-06-26 | End: 2025-06-26

## 2025-06-26 RX ORDER — LIDOCAINE HYDROCHLORIDE 20 MG/ML
INJECTION, SOLUTION INFILTRATION; PERINEURAL PRN
Status: DISCONTINUED | OUTPATIENT
Start: 2025-06-26 | End: 2025-06-26

## 2025-06-26 RX ORDER — PROCHLORPERAZINE MALEATE 5 MG/1
10 TABLET ORAL EVERY 6 HOURS PRN
Status: DISCONTINUED | OUTPATIENT
Start: 2025-06-26 | End: 2025-06-26

## 2025-06-26 RX ORDER — POTASSIUM CHLORIDE 1500 MG/1
40 TABLET, EXTENDED RELEASE ORAL ONCE
Status: COMPLETED | OUTPATIENT
Start: 2025-06-26 | End: 2025-06-26

## 2025-06-26 RX ADMIN — FENTANYL CITRATE 50 MCG: 50 INJECTION INTRAMUSCULAR; INTRAVENOUS at 14:21

## 2025-06-26 RX ADMIN — SODIUM CHLORIDE, SODIUM LACTATE, POTASSIUM CHLORIDE, AND CALCIUM CHLORIDE: .6; .31; .03; .02 INJECTION, SOLUTION INTRAVENOUS at 13:35

## 2025-06-26 RX ADMIN — POTASSIUM & SODIUM PHOSPHATES POWDER PACK 280-160-250 MG 1 PACKET: 280-160-250 PACK at 18:19

## 2025-06-26 RX ADMIN — ROCURONIUM BROMIDE 50 MG: 50 INJECTION, SOLUTION INTRAVENOUS at 13:40

## 2025-06-26 RX ADMIN — LIDOCAINE HYDROCHLORIDE 50 MG: 20 INJECTION, SOLUTION INFILTRATION; PERINEURAL at 13:39

## 2025-06-26 RX ADMIN — PHENYLEPHRINE HYDROCHLORIDE 200 MCG: 10 INJECTION INTRAVENOUS at 13:48

## 2025-06-26 RX ADMIN — DEXAMETHASONE SODIUM PHOSPHATE 4 MG: 4 INJECTION, SOLUTION INTRA-ARTICULAR; INTRALESIONAL; INTRAMUSCULAR; INTRAVENOUS; SOFT TISSUE at 13:40

## 2025-06-26 RX ADMIN — PROPOFOL 200 MG: 10 INJECTION, EMULSION INTRAVENOUS at 13:39

## 2025-06-26 RX ADMIN — POTASSIUM CHLORIDE 40 MEQ: 1500 TABLET, EXTENDED RELEASE ORAL at 01:23

## 2025-06-26 RX ADMIN — PHENYLEPHRINE HYDROCHLORIDE 200 MCG: 10 INJECTION INTRAVENOUS at 13:44

## 2025-06-26 RX ADMIN — Medication 3 UNITS: at 13:57

## 2025-06-26 RX ADMIN — SUGAMMADEX 200 MG: 100 INJECTION, SOLUTION INTRAVENOUS at 15:23

## 2025-06-26 RX ADMIN — PHENYLEPHRINE HYDROCHLORIDE 200 MCG: 10 INJECTION INTRAVENOUS at 13:54

## 2025-06-26 RX ADMIN — PHENYLEPHRINE HYDROCHLORIDE 200 MCG: 10 INJECTION INTRAVENOUS at 14:47

## 2025-06-26 RX ADMIN — PHENYLEPHRINE HYDROCHLORIDE 200 MCG: 10 INJECTION INTRAVENOUS at 14:21

## 2025-06-26 RX ADMIN — POTASSIUM & SODIUM PHOSPHATES POWDER PACK 280-160-250 MG 1 PACKET: 280-160-250 PACK at 23:00

## 2025-06-26 RX ADMIN — ONDANSETRON 4 MG: 2 INJECTION INTRAMUSCULAR; INTRAVENOUS at 15:17

## 2025-06-26 RX ADMIN — SODIUM CHLORIDE, SODIUM LACTATE, POTASSIUM CHLORIDE, AND CALCIUM CHLORIDE: .6; .31; .03; .02 INJECTION, SOLUTION INTRAVENOUS at 07:34

## 2025-06-26 RX ADMIN — ALBUMIN HUMAN: 0.05 INJECTION, SOLUTION INTRAVENOUS at 14:02

## 2025-06-26 RX ADMIN — Medication 2 UNITS: at 14:59

## 2025-06-26 RX ADMIN — PHENYLEPHRINE HYDROCHLORIDE 200 MCG: 10 INJECTION INTRAVENOUS at 14:56

## 2025-06-26 RX ADMIN — ALBUMIN HUMAN: 0.05 INJECTION, SOLUTION INTRAVENOUS at 14:37

## 2025-06-26 RX ADMIN — PHENYLEPHRINE HYDROCHLORIDE 100 MCG: 10 INJECTION INTRAVENOUS at 15:23

## 2025-06-26 RX ADMIN — SODIUM CHLORIDE, SODIUM LACTATE, POTASSIUM CHLORIDE, AND CALCIUM CHLORIDE: .6; .31; .03; .02 INJECTION, SOLUTION INTRAVENOUS at 14:15

## 2025-06-26 RX ADMIN — SODIUM CHLORIDE, SODIUM LACTATE, POTASSIUM CHLORIDE, AND CALCIUM CHLORIDE: .6; .31; .03; .02 INJECTION, SOLUTION INTRAVENOUS at 18:54

## 2025-06-26 RX ADMIN — MIDAZOLAM 2 MG: 1 INJECTION INTRAMUSCULAR; INTRAVENOUS at 13:35

## 2025-06-26 RX ADMIN — ROCURONIUM BROMIDE 20 MG: 50 INJECTION, SOLUTION INTRAVENOUS at 14:17

## 2025-06-26 RX ADMIN — GLUCAGON 0.4 MG: KIT at 14:42

## 2025-06-26 RX ADMIN — FENTANYL CITRATE 50 MCG: 50 INJECTION INTRAMUSCULAR; INTRAVENOUS at 13:39

## 2025-06-26 RX ADMIN — GLYCOPYRROLATE 0.2 MG: 0.2 INJECTION, SOLUTION INTRAMUSCULAR; INTRAVENOUS at 14:17

## 2025-06-26 RX ADMIN — Medication 1 UNITS: at 14:27

## 2025-06-26 ASSESSMENT — ACTIVITIES OF DAILY LIVING (ADL)
ADLS_ACUITY_SCORE: 41
ADLS_ACUITY_SCORE: 40
ADLS_ACUITY_SCORE: 41
ADLS_ACUITY_SCORE: 40
ADLS_ACUITY_SCORE: 41
ADLS_ACUITY_SCORE: 40
ADLS_ACUITY_SCORE: 41
ADLS_ACUITY_SCORE: 40
ADLS_ACUITY_SCORE: 41
ADLS_ACUITY_SCORE: 41

## 2025-06-26 ASSESSMENT — LIFESTYLE VARIABLES: TOBACCO_USE: 1

## 2025-06-26 NOTE — ANESTHESIA PROCEDURE NOTES
Airway       Patient location during procedure: OR       Procedure Start/Stop Times: 6/26/2025 1:43 PM  Staff -        CRNA: Rochelle Uribe APRN CRNA       Performed By: CRNA  Consent for Airway        Urgency: elective  Indications and Patient Condition       Indications for airway management: ronel-procedural       Induction type:intravenous       Mask difficulty assessment: 1 - vent by mask    Final Airway Details       Final airway type: endotracheal airway       Successful airway: ETT - single  Endotracheal Airway Details        ETT size (mm): 8.0       Cuffed: yes       Successful intubation technique: video laryngoscopy       VL Blade Size: Glidescope 3       Grade View of Cords: 1       Adjucts: stylet       Position: Right       Measured from: gums/teeth       Secured at (cm): 24       Bite block used: None    Post intubation assessment        Placement verified by: capnometry, equal breath sounds and chest rise        Number of attempts at approach: 1       Number of other approaches attempted: 0       Secured with: tape       Ease of procedure: easy       Dentition: Intact and Unchanged    Medication(s) Administered   Medication Administration Time: 6/26/2025 1:43 PM

## 2025-06-26 NOTE — PLAN OF CARE
Pt alert and oriented x4. Bowel prep throughout the night. He reports clear watery stools. Denies pain. LR running at 125 ml/hr. On RA. SBA. K, Mg, and Phos protocol. K low and replaced. LFT's elevated. Plans for colonoscopy and ERCP this morning.  Goal Outcome Evaluation:  Plan of Care Reviewed With: patient, family  Overall Patient Progress: no change. Overall Patient Progress: no change  Outcome Evaluation: Pt on bowel prep. Reports clear watery stools. Colonoscopy and ERCP planned for this morning.    Problem: Adult Inpatient Plan of Care  Goal: Plan of Care Review  Description: The Plan of Care Review/Shift note should be completed every shift.  The Outcome Evaluation is a brief statement about your assessment that the patient is improving, declining, or no change.  This information will be displayed automatically on your shift  note.  6/26/2025 0758 by Jun Jean RN  Outcome: Not Progressing  Flowsheets (Taken 6/26/2025 0758)  Plan of Care Reviewed With:   patient   family  Overall Patient Progress: no change  6/26/2025 0757 by Jun Jean RN  Outcome: Not Progressing  6/26/2025 0757 by Jun Jean RN  Outcome: Not Progressing  Flowsheets (Taken 6/26/2025 0757)  Plan of Care Reviewed With:   patient   family  Overall Patient Progress: no change  6/26/2025 0756 by Jun Jean RN  Outcome: Not Progressing  Flowsheets (Taken 6/26/2025 0756)  Plan of Care Reviewed With:   patient   family  Overall Patient Progress: no change  6/26/2025 0754 by Jun Jean RN  Outcome: Not Progressing  Flowsheets (Taken 6/26/2025 0754)  Outcome Evaluation: Pt on bowel prep. Reports clear watery stools. Colonoscopy and ERCP planned for this morning.  Plan of Care Reviewed With:   patient   family  Overall Patient Progress: no change  Goal: Absence of Hospital-Acquired Illness or Injury  Intervention: Identify and Manage Fall Risk  Recent Flowsheet Documentation  Taken 6/26/2025 0121 by  [place], Jun O, RN  Safety Promotion/Fall Prevention: safety round/check completed  Taken 6/25/2025 1953 by Jun Jean RN  Safety Promotion/Fall Prevention: safety round/check completed  Intervention: Prevent Skin Injury  Recent Flowsheet Documentation  Taken 6/26/2025 0121 by Jun Jean RN  Body Position: position changed independently  Intervention: Prevent and Manage VTE (Venous Thromboembolism) Risk  Recent Flowsheet Documentation  Taken 6/26/2025 0121 by Jun Jean RN  VTE Prevention/Management: compression stockings off  Intervention: Prevent Infection  Recent Flowsheet Documentation  Taken 6/26/2025 0121 by Jun Jean RN  Infection Prevention: rest/sleep promoted     Problem: Fall Injury Risk  Goal: Absence of Fall and Fall-Related Injury  Intervention: Promote Injury-Free Environment  Recent Flowsheet Documentation  Taken 6/26/2025 0121 by Jun Jean RN  Safety Promotion/Fall Prevention: safety round/check completed  Taken 6/25/2025 1953 by Jun Jean RN  Safety Promotion/Fall Prevention: safety round/check completed     Problem: Liver Failure  Goal: Effective Oxygenation and Ventilation  Intervention: Promote Airway Secretion Clearance  Recent Flowsheet Documentation  Taken 6/26/2025 0121 by Jun Jean RN  Cough And Deep Breathing: done independently per patient  Activity Management: up ad lyle

## 2025-06-26 NOTE — PLAN OF CARE
Goal Outcome Evaluation:        Pt A&O. Arrived back to unit from procedure. Denies pain. Denies N/V. Tolerating clears. US of L hand completed. IVF infusing. Continued phos replacement. Transfers with Ax1, voided, .     Problem: Adult Inpatient Plan of Care  Goal: Plan of Care Review  Description: The Plan of Care Review/Shift note should be completed every shift.  The Outcome Evaluation is a brief statement about your assessment that the patient is improving, declining, or no change.  This information will be displayed automatically on your shift  note.  Outcome: Progressing  Goal: Absence of Hospital-Acquired Illness or Injury  Outcome: Progressing  Intervention: Identify and Manage Fall Risk  Recent Flowsheet Documentation  Taken 6/26/2025 1700 by Ofelia Chun RN  Safety Promotion/Fall Prevention:   activity supervised   increase visualization of patient   increased rounding and observation   clutter free environment maintained   lighting adjusted   mobility aid in reach   nonskid shoes/slippers when out of bed   patient and family education   safety round/check completed  Intervention: Prevent Skin Injury  Recent Flowsheet Documentation  Taken 6/26/2025 1700 by Ofelia Chun, RN  Skin Protection: adhesive use limited  Taken 6/26/2025 1637 by Ofelia Chun, RN  Body Position: supine, head elevated  Goal: Optimal Comfort and Wellbeing  Outcome: Progressing  Goal: Readiness for Transition of Care  Outcome: Progressing     Problem: Fall Injury Risk  Goal: Absence of Fall and Fall-Related Injury  Outcome: Progressing  Intervention: Identify and Manage Contributors  Recent Flowsheet Documentation  Taken 6/26/2025 1700 by Ofelia Chun, RN  Medication Review/Management: medications reviewed  Intervention: Promote Injury-Free Environment  Recent Flowsheet Documentation  Taken 6/26/2025 1700 by Ofelia Chun, RN  Safety Promotion/Fall Prevention:   activity supervised   increase  visualization of patient   increased rounding and observation   clutter free environment maintained   lighting adjusted   mobility aid in reach   nonskid shoes/slippers when out of bed   patient and family education   safety round/check completed     Problem: Acute Kidney Injury/Impairment  Goal: Fluid and Electrolyte Balance  Outcome: Progressing  Intervention: Monitor and Manage Fluid and Electrolyte Balance  Recent Flowsheet Documentation  Taken 6/26/2025 1700 by Ofelia Chun RN  Fluid/Electrolyte Management: fluids adjusted  Goal: Improved Oral Intake  Outcome: Progressing  Goal: Effective Renal Function  Outcome: Progressing  Intervention: Monitor and Support Renal Function  Recent Flowsheet Documentation  Taken 6/26/2025 1700 by Ofelia Chun RN  Medication Review/Management: medications reviewed     Problem: Liver Failure  Goal: Optimal Coping with Liver Failure  Outcome: Progressing  Goal: Absence of Bleeding  Outcome: Progressing  Goal: Fluid and Electrolyte Balance  Outcome: Progressing  Intervention: Monitor and Manage Fluid and Electrolyte Balance  Recent Flowsheet Documentation  Taken 6/26/2025 1700 by Ofelia Chun RN  Fluid/Electrolyte Management: fluids adjusted  Goal: Optimal Gastrointestinal Function  Outcome: Progressing  Intervention: Monitor and Support Gastrointestinal Function  Recent Flowsheet Documentation  Taken 6/26/2025 1700 by Ofelia Chun RN  Fluid/Electrolyte Management: fluids adjusted  Goal: Blood Glucose Level Within Target Range  Outcome: Progressing  Goal: Hemodynamic Stability  Outcome: Progressing  Goal: Absence of Infection Signs and Symptoms  Outcome: Progressing  Goal: Optimal Neurologic Function  Outcome: Progressing  Intervention: Monitor and Optimize Neurologic Status  Recent Flowsheet Documentation  Taken 6/26/2025 1637 by Ofelia Chun RN  Head of Bed (HOB) Positioning: HOB at 20-30 degrees  Goal: Improved Oral Intake  Outcome:  Progressing  Goal: Optimal Pain Control, Comfort and Function  Outcome: Progressing  Goal: Effective Oxygenation and Ventilation  Outcome: Progressing  Intervention: Promote Airway Secretion Clearance  Recent Flowsheet Documentation  Taken 6/26/2025 1700 by Ofelia Chun, RN  Cough And Deep Breathing: done independently per patient  Activity Management: ambulated to bathroom  Taken 6/26/2025 1637 by Ofelia Chun, RN  Activity Management: activity adjusted per tolerance  Intervention: Optimize Oxygenation and Ventilation  Recent Flowsheet Documentation  Taken 6/26/2025 1637 by Ofelia Chun, RN  Head of Bed (HOB) Positioning: HOB at 20-30 degrees

## 2025-06-26 NOTE — ANESTHESIA CARE TRANSFER NOTE
Patient: Zoltan Dalton    Procedure: Procedure(s):  Endoscopic retrograde cholangiopancreatogram, sphincterotomy with stent placement x2  ESOPHAGOGASTRODUODENOSCOPY, WITH FINE NEEDLE ASPIRATION BIOPSY, ENDOSCOPIC ULTRASOUND GUIDANCE  Colonoscopy       Diagnosis: Liver mass [R16.0]  Elevated bilirubin [R17]  Diagnosis Additional Information: No value filed.    Anesthesia Type:   General     Note:    Oropharynx: oropharynx clear of all foreign objects and spontaneously breathing  Level of Consciousness: drowsy  Oxygen Supplementation: face mask  Level of Supplemental Oxygen (L/min / FiO2): 5  Independent Airway: airway patency satisfactory and stable  Dentition: dentition unchanged  Vital Signs Stable: post-procedure vital signs reviewed and stable  Report to RN Given: handoff report given  Patient transferred to: PACU    Handoff Report: Identifed the Patient, Identified the Reponsible Provider, Reviewed the pertinent medical history, Discussed the surgical course, Reviewed Intra-OP anesthesia mangement and issues during anesthesia, Set expectations for post-procedure period and Allowed opportunity for questions and acknowledgement of understanding      Vitals:  Vitals Value Taken Time   /65 06/26/25 15:40   Temp     Pulse 73 06/26/25 15:41   Resp 7 06/26/25 15:41   SpO2 100 % 06/26/25 15:41   Vitals shown include unfiled device data.    Electronically Signed By: Dean Dennis Severson, APRN CRNA  June 26, 2025  3:42 PM

## 2025-06-26 NOTE — PLAN OF CARE
"VSS afebrile. LFT's elevated, See US abd and CT. UCX no growth. Cr improving.On IVF's.  MRCP done yesterday,see results. ECRP and Colonoscopy today. On Golytely Prep, Pt now having clear/brown flec's watery. GI and Colorectal surgery following. K+, Mag and phos protocol. Pt left hand swollen, pt reports no injury to hand, md aware and US added.       Goal Outcome Evaluation:      Plan of Care Reviewed With: patient, family    Overall Patient Progress: no changeOverall Patient Progress: no change    Outcome Evaluation: VSS afebrile. LFT's elevated, See US abd and CT. UCX no growth. Cr improving.On IVF's.  MRCP done yesterday,see results. ECRP and Colonoscopy today. On Golytely Prep, Pt now having clear/brown flec's watery. GI and Colorectal surgery following. K+, Mag and phos protocol.          Problem: Adult Inpatient Plan of Care  Goal: Plan of Care Review  Description: The Plan of Care Review/Shift note should be completed every shift.  The Outcome Evaluation is a brief statement about your assessment that the patient is improving, declining, or no change.  This information will be displayed automatically on your shift  note.  Outcome: Progressing  Flowsheets (Taken 6/26/2025 1139)  Outcome Evaluation: VSS afebrile. LFT's elevated, See US abd and CT. UCX no growth. Cr improving.On IVF's.  MRCP done yesterday,see results. ECRP and Colonoscopy today. On Golytely Prep, Pt now having clear/brown flec's watery. GI and Colorectal surgery following. K+, Mag and phos protocol.  Plan of Care Reviewed With:   patient   family  Overall Patient Progress: no change  Goal: Patient-Specific Goal (Individualized)  Description: You can add care plan individualizations to a care plan. Examples of Individualization might be:  \"Parent requests to be called daily at 9am for status\", \"I have a hard time hearing out of my right ear\", or \"Do not touch me to wake me up as it startles  me\".  Outcome: Progressing  Goal: Absence of " Hospital-Acquired Illness or Injury  Outcome: Progressing  Intervention: Identify and Manage Fall Risk  Recent Flowsheet Documentation  Taken 6/26/2025 0745 by Nicole Govea RN  Safety Promotion/Fall Prevention: activity supervised  Intervention: Prevent Skin Injury  Recent Flowsheet Documentation  Taken 6/26/2025 0745 by Nicole Govea RN  Body Position: position changed independently  Goal: Optimal Comfort and Wellbeing  Outcome: Progressing  Goal: Readiness for Transition of Care  Outcome: Progressing     Problem: Fall Injury Risk  Goal: Absence of Fall and Fall-Related Injury  Outcome: Progressing  Intervention: Identify and Manage Contributors  Recent Flowsheet Documentation  Taken 6/26/2025 0745 by Nicole Govea RN  Medication Review/Management: medications reviewed  Intervention: Promote Injury-Free Environment  Recent Flowsheet Documentation  Taken 6/26/2025 0745 by Nicole Govea RN  Safety Promotion/Fall Prevention: activity supervised     Problem: Acute Kidney Injury/Impairment  Goal: Fluid and Electrolyte Balance  Outcome: Progressing  Goal: Improved Oral Intake  Outcome: Progressing  Goal: Effective Renal Function  Outcome: Progressing  Intervention: Monitor and Support Renal Function  Recent Flowsheet Documentation  Taken 6/26/2025 0745 by Nicole Govea RN  Medication Review/Management: medications reviewed     Problem: Liver Failure  Goal: Optimal Coping with Liver Failure  Outcome: Progressing  Goal: Absence of Bleeding  Outcome: Progressing  Goal: Fluid and Electrolyte Balance  Outcome: Progressing  Goal: Optimal Gastrointestinal Function  Outcome: Progressing  Goal: Blood Glucose Level Within Target Range  Outcome: Progressing  Goal: Hemodynamic Stability  Outcome: Progressing  Goal: Absence of Infection Signs and Symptoms  Outcome: Progressing  Goal: Optimal Neurologic Function  Outcome: Progressing  Goal: Improved Oral Intake  Outcome: Progressing  Goal: Optimal Pain Control, Comfort and  Function  Outcome: Progressing  Goal: Effective Oxygenation and Ventilation  Outcome: Progressing  Intervention: Promote Airway Secretion Clearance  Recent Flowsheet Documentation  Taken 6/26/2025 2945 by Nicole Govea RN  Activity Management: up ad lyle

## 2025-06-26 NOTE — PROGRESS NOTES
[facility]    Medicine Progress Note - Hospitalist Service    Date of Admission:  6/24/2025    Assessment & Plan   Zoltan Dalton is a 60 year old male with PMH of tobacco use disorder, who was admitted on 6/24/2025 with abdominal pain.    Presumed metastatic malignancy - hepatobiliary vs pancreatic source  Multiple liver lesions and intrahepatic biliary ductal dilatation  Early satiety  40lb weight loss  Epigastric and abdominal pain  Failure to thrive  Presents with months of above symptoms. Does not have a PCP or interact with the healthcare system often. Hx of ~6 drinks/week for years.   Afebrile and hemodynamically stable on arrival. Labs notable for tbili 33.4, alk phos 489, AST//140, Cr 2.24, WBC 14.5. Abs US showed moderate to severe intrahepatic biliary ductal dilatation in L hepatic lobe, common duct dilated to 12mm, and heterogenous liver with multiple indeterminate hypoechoic lesions. CT CAP with findings worrisome for liver malignancy- hepatic masses, severe L>R intrahepatic biliary dilatation; indeterminate focal region of wall thickening and decompression at mid ascending colon, considered colonic mass until proven otherwise; several indeterminate pulmonary nodules bilaterally, and small ascites.   Paracentesis unable to be completed 2/2 small volume ascites. AFP<1.8, CA 19-9 pending, CEA 43.1. Hepatitis panel negative.   MRCP showed massive intrahepatic ductal dilatation and findings that may represent intrahepatic obstructing cholangiocarcinoma vs pancreatic neoplasm with mets.   - GI consulted  - ERCP and colonoscopy 6/26    Colonic mass  Previous colonoscopy '21 unremarkable, though had 4mm tubular adenoma in transverse colon on colonoscopy '15.   - GI planning colonoscopy  - CRS consulted - agree with colonoscopy, upfront surgery usually not recommended for metastatic disease    Transaminitis   Likely related to above.   - Trend LFTs    PIYUSH  Cr 2.24 from unclear  baseline. Likely pre-renal given improvement with IVF.   - IVF   - Avoid nephrotoxins  - AM BMP    Bilateral pulmonary nodules  Possible metastatic disease vs infectious or inflammatory.   - Follow up CT chest in 3 months pending above workup    Tobacco use disorder - daily smoker, encourage cessation,           Diet: NPO for Procedure/Surgery per Anesthesia Guidelines Except for: Meds; Clear liquids before procedure/surgery: ADULT (Age GREATER than or Equal to 18 years) - Clear liquids 2 hours before procedure/surgery    DVT Prophylaxis: Pneumatic Compression Devices  Maki Catheter: Not present  Lines: None     Cardiac Monitoring: None  Code Status: Full Code      Clinically Significant Risk Factors        # Hypokalemia: Lowest K = 2.9 mmol/L in last 2 days, will replace as needed     # Hypercalcemia: Highest Ca = 10.8 mg/dL in last 2 days, will monitor as appropriate    # Hypoalbuminemia: Lowest albumin = 2.2 g/dL at 6/25/2025  7:11 AM, will monitor as appropriate                           Social Drivers of Health    Tobacco Use: Medium Risk (6/25/2025)    Patient History     Smoking Tobacco Use: Former     Smokeless Tobacco Use: Never   Alcohol Use: Unknown (3/4/2021)    AUDIT-C     Frequency of Alcohol Consumption: 2-4 times a month     Average Number of Drinks: Not asked     Frequency of Binge Drinking: Not asked          Disposition Plan     Medically Ready for Discharge: Anticipated in 2-4 Days             Jane Rhodes DO  Hospitalist Service  [facility]  Securely message with [employer] (more info)  Text page via [employer] Paging/Directory   ______________________________________________________________________    Interval History   No acute overnight events.  Awaiting colonoscopy and ERCP. No acute concerns.     Physical Exam   Vital Signs: Temp: 97.6  F (36.4  C) Temp src: Oral BP: 122/55 Pulse: 75   Resp: 16 SpO2: 98 % O2 Device: None (Room air)    Weight: 124 lbs 4.8  oz    Constitutional: Awake, alert, no distress, and cooperative  Cardiovascular: Regular rate and rhythm, normal S1 and S2, no S3 or S4, and no murmur noted  Respiratory: No increased work of breathing, good air exchange, clear to auscultation bilaterally, no crackles or wheezing  Gastrointestinal: Abdomen soft, non-tender, non-distended. BS normal. No masses, organomegaly     Medical Decision Making       45 MINUTES SPENT BY ME on the date of service doing chart review, history, exam, documentation & further activities per the note.      Data     I have personally reviewed the following data over the past 24 hrs:    12.8 (H)  \   10.2 (L)   / 284     140 109 (H) 44.8 (H) /  73   3.6; 3.6 23 1.42 (H) \     ALT: 110 (H) AST: 110 (H) AP: 391 (H) TBILI: 25.4 (HH)   ALB: 2.2 (L) TOT PROTEIN: 4.4 (L) LIPASE: N/A     INR:  1.14 PTT:  N/A   D-dimer:  N/A Fibrinogen:  N/A

## 2025-06-26 NOTE — ANESTHESIA POSTPROCEDURE EVALUATION
Patient: Zoltan Dalton    Procedure: Procedure(s):  Endoscopic retrograde cholangiopancreatogram, sphincterotomy with stent placement x2  ESOPHAGOGASTRODUODENOSCOPY, WITH FINE NEEDLE ASPIRATION BIOPSY, ENDOSCOPIC ULTRASOUND GUIDANCE  Colonoscopy       Anesthesia Type:  General    Note:     Postop Pain Control: Uneventful            Sign Out: Well controlled pain   PONV: No   Neuro/Psych: Uneventful            Sign Out: Acceptable/Baseline neuro status   Airway/Respiratory: Uneventful            Sign Out: Acceptable/Baseline resp. status   CV/Hemodynamics: Uneventful            Sign Out: Acceptable CV status   Other NRE: NONE   DID A NON-ROUTINE EVENT OCCUR? No           Last vitals:  Vitals Value Taken Time   /59 06/26/25 16:37   Temp 97.4  F (36.3  C) 06/26/25 16:37   Pulse 102 06/26/25 16:37   Resp 15 06/26/25 16:37   SpO2 100 % 06/26/25 16:37       Electronically Signed By: Ambrose Rogers MD  June 26, 2025  4:40 PM

## 2025-06-26 NOTE — ANESTHESIA PREPROCEDURE EVALUATION
Anesthesia Pre-Procedure Evaluation    Patient: Zoltan Dalton   MRN: [id number] : 1965          Procedure : Procedure(s):  ENDOSCOPIC RETROGRADE CHOLANGIOPANCREATOGRAPHY, AND COLONOSCOPY  Colonoscopy         History reviewed. No pertinent past medical history.   Past Surgical History:   Procedure Laterality Date    COLONOSCOPY      COLONOSCOPY N/A 2021    Procedure: COLONOSCOPY (fv);  Surgeon: Ambrose Vargas MD;  Location: [facility]      No Known Allergies   Social History     Tobacco Use    Smoking status: Former     Current packs/day: 0.00     Average packs/day: 1 pack/day for 41.0 years (41.0 ttl pk-yrs)     Types: Cigarettes     Start date:      Quit date: 2020     Years since quittin.5    Smokeless tobacco: Never   Substance Use Topics    Alcohol use: Yes     Comment: 6 per week      Wt Readings from Last 1 Encounters:   25 56.4 kg (124 lb 4.8 oz)        Anesthesia Evaluation   Pt has had prior anesthetic. Type: General.    No history of anesthetic complications       ROS/MED HX  ENT/Pulmonary:  - neg pulmonary ROS   (+)                tobacco use, Current use,                       Neurologic:  - neg neurologic ROS     Cardiovascular:  - neg cardiovascular ROS     METS/Exercise Tolerance:     Hematologic:  - neg hematologic  ROS     Musculoskeletal:  - neg musculoskeletal ROS     GI/Hepatic: Comment: Likely malignancy in liver, colon mass    (+)             liver disease,       Renal/Genitourinary:     (+) renal disease,             Endo:  - neg endo ROS     Psychiatric/Substance Use:  - neg psychiatric ROS     Infectious Disease:  - neg infectious disease ROS     Malignancy:  - neg malignancy ROS     Other:  - neg other ROS            Physical Exam  Airway  Mallampati: I  TM distance: >3 FB  Neck ROM: full  Upper bite lip test: I  Mouth opening: >= 4 cm    Cardiovascular - normal exam  Rhythm: regular  Rate: normal rate     Dental     Pulmonary - normal examBreath sounds  "clear to auscultation        Neurological - normal exam  He appears awake, alert and oriented x3.    Other Findings       OUTSIDE LABS:  CBC:   Lab Results   Component Value Date    WBC 12.8 (H) 06/26/2025    WBC 12.5 (H) 06/25/2025    HGB 10.2 (L) 06/26/2025    HGB 10.5 (L) 06/25/2025    HCT 27.3 (L) 06/26/2025    HCT 28.1 (L) 06/25/2025     06/26/2025     06/25/2025     BMP:   Lab Results   Component Value Date     06/26/2025     06/25/2025    POTASSIUM 3.6 06/26/2025    POTASSIUM 3.6 06/26/2025    CHLORIDE 109 (H) 06/26/2025    CHLORIDE 106 06/25/2025    CO2 23 06/26/2025    CO2 23 06/25/2025    BUN 44.8 (H) 06/26/2025    BUN 51.4 (H) 06/25/2025    CR 1.42 (H) 06/26/2025    CR 1.50 (H) 06/25/2025    GLC 73 06/26/2025    GLC 91 06/26/2025     COAGS:   Lab Results   Component Value Date    PTT 26 06/24/2025    INR 1.14 06/26/2025     POC: No results found for: \"BGM\", \"HCG\", \"HCGS\"  HEPATIC:   Lab Results   Component Value Date    ALBUMIN 2.2 (L) 06/26/2025    PROTTOTAL 4.4 (L) 06/26/2025     (H) 06/26/2025     (H) 06/26/2025    ALKPHOS 391 (H) 06/26/2025    BILITOTAL 25.4 (HH) 06/26/2025     OTHER:   Lab Results   Component Value Date    LACT 1.0 06/24/2025    A1C 4.3 06/24/2025    SANDRA 10.9 (H) 06/26/2025    PHOS 2.3 (L) 06/26/2025    MAG 2.4 (H) 06/26/2025    LIPASE 31 06/24/2025    TSH  06/24/2025      Comment:      Unsatisfactory specimen - icteric.           Anesthesia Plan    ASA Status:  3      NPO Status: NPO Appropriate   Anesthesia Type: General.  Airway: oral.  Induction: intravenous.   Techniques and Equipment:       - Monitoring Plan: standard ASA monitoring     Consents    Anesthesia Plan(s) and associated risks, benefits, and realistic alternatives discussed. Questions answered and patient/representative(s) expressed understanding.     - Discussed: anesthesiologist     - Discussed with:  Patient          Blood Consent:      - Discussed with: patient.     - " Consented: consented to blood products     Postoperative Care    Pain management: plan for postoperative opioid use, multimodal analgesia.     Comments:                   Ambrose Rogers MD    I have reviewed the pertinent notes and labs in the chart from the past 30 days and (re)examined the patient.  Any updates or changes from those notes are reflected in this note.    Clinically Significant Risk Factors        # Hypokalemia: Lowest K = 2.9 mmol/L in last 2 days, will replace as needed   # Hyperchloremia: Highest Cl = 109 mmol/L in last 2 days, will monitor as appropriate       # Hypercalcemia: Highest Ca = 10.9 mg/dL in last 2 days, will monitor as appropriate    # Hypoalbuminemia: Lowest albumin = 2.2 g/dL at 6/26/2025  7:09 AM, will monitor as appropriate

## 2025-06-27 LAB
ALBUMIN SERPL BCG-MCNC: 2.5 G/DL (ref 3.5–5.2)
ALP SERPL-CCNC: 352 U/L (ref 40–150)
ALT SERPL W P-5'-P-CCNC: 104 U/L (ref 0–70)
ANION GAP SERPL CALCULATED.3IONS-SCNC: 10 MMOL/L (ref 7–15)
AST SERPL W P-5'-P-CCNC: 101 U/L (ref 0–45)
BILIRUB SERPL-MCNC: 26.4 MG/DL
BUN SERPL-MCNC: 38 MG/DL (ref 8–23)
CALCIUM SERPL-MCNC: 10.7 MG/DL (ref 8.8–10.4)
CHLORIDE SERPL-SCNC: 107 MMOL/L (ref 98–107)
CREAT SERPL-MCNC: 1.47 MG/DL (ref 0.67–1.17)
EGFRCR SERPLBLD CKD-EPI 2021: 54 ML/MIN/1.73M2
ERYTHROCYTE [DISTWIDTH] IN BLOOD BY AUTOMATED COUNT: 24 % (ref 10–15)
GLUCOSE SERPL-MCNC: 109 MG/DL (ref 70–99)
HCO3 SERPL-SCNC: 24 MMOL/L (ref 22–29)
HCT VFR BLD AUTO: 26.2 % (ref 40–53)
HGB BLD-MCNC: 9.7 G/DL (ref 13.3–17.7)
MAGNESIUM SERPL-MCNC: 2.2 MG/DL (ref 1.7–2.3)
MCH RBC QN AUTO: 33.7 PG (ref 26.5–33)
MCHC RBC AUTO-ENTMCNC: 37 G/DL (ref 31.5–36.5)
MCV RBC AUTO: 91 FL (ref 78–100)
PHOSPHATE SERPL-MCNC: 2.6 MG/DL (ref 2.5–4.5)
PLATELET # BLD AUTO: 269 10E3/UL (ref 150–450)
POTASSIUM SERPL-SCNC: 3.2 MMOL/L (ref 3.4–5.3)
POTASSIUM SERPL-SCNC: 3.3 MMOL/L (ref 3.4–5.3)
POTASSIUM SERPL-SCNC: 3.8 MMOL/L (ref 3.4–5.3)
PROT SERPL-MCNC: 4.3 G/DL (ref 6.4–8.3)
RBC # BLD AUTO: 2.88 10E6/UL (ref 4.4–5.9)
SODIUM SERPL-SCNC: 141 MMOL/L (ref 135–145)
WBC # BLD AUTO: 14.1 10E3/UL (ref 4–11)

## 2025-06-27 PROCEDURE — 84100 ASSAY OF PHOSPHORUS: CPT | Performed by: STUDENT IN AN ORGANIZED HEALTH CARE EDUCATION/TRAINING PROGRAM

## 2025-06-27 PROCEDURE — 83735 ASSAY OF MAGNESIUM: CPT | Performed by: STUDENT IN AN ORGANIZED HEALTH CARE EDUCATION/TRAINING PROGRAM

## 2025-06-27 PROCEDURE — 84132 ASSAY OF SERUM POTASSIUM: CPT | Performed by: STUDENT IN AN ORGANIZED HEALTH CARE EDUCATION/TRAINING PROGRAM

## 2025-06-27 PROCEDURE — 120N000001 HC R&B MED SURG/OB

## 2025-06-27 PROCEDURE — 99232 SBSQ HOSP IP/OBS MODERATE 35: CPT | Performed by: STUDENT IN AN ORGANIZED HEALTH CARE EDUCATION/TRAINING PROGRAM

## 2025-06-27 PROCEDURE — 250N000013 HC RX MED GY IP 250 OP 250 PS 637: Performed by: STUDENT IN AN ORGANIZED HEALTH CARE EDUCATION/TRAINING PROGRAM

## 2025-06-27 PROCEDURE — 85018 HEMOGLOBIN: CPT | Performed by: STUDENT IN AN ORGANIZED HEALTH CARE EDUCATION/TRAINING PROGRAM

## 2025-06-27 PROCEDURE — 258N000003 HC RX IP 258 OP 636: Performed by: STUDENT IN AN ORGANIZED HEALTH CARE EDUCATION/TRAINING PROGRAM

## 2025-06-27 PROCEDURE — 82040 ASSAY OF SERUM ALBUMIN: CPT | Performed by: STUDENT IN AN ORGANIZED HEALTH CARE EDUCATION/TRAINING PROGRAM

## 2025-06-27 PROCEDURE — 36415 COLL VENOUS BLD VENIPUNCTURE: CPT | Performed by: STUDENT IN AN ORGANIZED HEALTH CARE EDUCATION/TRAINING PROGRAM

## 2025-06-27 PROCEDURE — 258N000003 HC RX IP 258 OP 636: Performed by: NURSE PRACTITIONER

## 2025-06-27 RX ORDER — POTASSIUM CHLORIDE 1500 MG/1
40 TABLET, EXTENDED RELEASE ORAL ONCE
Status: COMPLETED | OUTPATIENT
Start: 2025-06-27 | End: 2025-06-27

## 2025-06-27 RX ADMIN — POTASSIUM CHLORIDE 40 MEQ: 1500 TABLET, EXTENDED RELEASE ORAL at 10:40

## 2025-06-27 RX ADMIN — POTASSIUM CHLORIDE 40 MEQ: 1500 TABLET, EXTENDED RELEASE ORAL at 18:12

## 2025-06-27 RX ADMIN — SODIUM CHLORIDE, SODIUM LACTATE, POTASSIUM CHLORIDE, AND CALCIUM CHLORIDE: .6; .31; .03; .02 INJECTION, SOLUTION INTRAVENOUS at 18:12

## 2025-06-27 RX ADMIN — SODIUM CHLORIDE, SODIUM LACTATE, POTASSIUM CHLORIDE, AND CALCIUM CHLORIDE: .6; .31; .03; .02 INJECTION, SOLUTION INTRAVENOUS at 10:00

## 2025-06-27 RX ADMIN — SODIUM CHLORIDE, SODIUM LACTATE, POTASSIUM CHLORIDE, AND CALCIUM CHLORIDE: .6; .31; .03; .02 INJECTION, SOLUTION INTRAVENOUS at 02:41

## 2025-06-27 ASSESSMENT — ACTIVITIES OF DAILY LIVING (ADL)
ADLS_ACUITY_SCORE: 45
ADLS_ACUITY_SCORE: 41
ADLS_ACUITY_SCORE: 45
ADLS_ACUITY_SCORE: 41
ADLS_ACUITY_SCORE: 45
ADLS_ACUITY_SCORE: 41

## 2025-06-27 NOTE — PROGRESS NOTES
[facility]    Medicine Progress Note - Hospitalist Service    Date of Admission:  6/24/2025    Assessment & Plan   Zoltan Dalton is a 60 year old male with PMH of tobacco use disorder, who was admitted on 6/24/2025 with abdominal pain.    Presumed metastatic malignancy - hepatobiliary vs pancreatic source  Multiple liver lesions and intrahepatic biliary ductal dilatation s/p stenting 6/26  Early satiety  40lb weight loss  Epigastric and abdominal pain  Failure to thrive  Presents with months of above symptoms. Does not have a PCP or interact with the healthcare system often. Hx of ~6 drinks/week for years.   Afebrile and hemodynamically stable on arrival. Labs notable for tbili 33.4, alk phos 489, AST//140, Cr 2.24, WBC 14.5. Abs US showed moderate to severe intrahepatic biliary ductal dilatation in L hepatic lobe, common duct dilated to 12mm, and heterogenous liver with multiple indeterminate hypoechoic lesions. CT CAP with findings worrisome for liver malignancy- hepatic masses, severe L>R intrahepatic biliary dilatation; indeterminate focal region of wall thickening and decompression at mid ascending colon, considered colonic mass until proven otherwise; several indeterminate pulmonary nodules bilaterally, and small ascites.   Paracentesis unable to be completed 2/2 small volume ascites. AFP<1.8, CA 19-9: 31, CEA 43.1. Hepatitis panel negative.   MRCP showed massive intrahepatic ductal dilatation and findings that may represent intrahepatic obstructing cholangiocarcinoma vs pancreatic neoplasm with mets.   ERCP 6/26 showed biliary obstruction at the hilum 2/2 masses seen on EUS. 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 Underwent sphincterotomy and stenting.   EUS 6/26 with innumerable hepatic masses with obstruction of bile duct at hilum. FNA done.  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 Still awaiting final pathology results. Will involve oncology given high likelihood for malignancy.  - GI consulted  - Will need stent exchange in 2 months  - Follow up FNA results  - Oncology consult    Transaminitis   Likely related to above.   - Trend LFTs    IPYUSH  Possible CKD  Cr 2.24 from unclear baseline. Likely pre-renal given improvement with IVF. Cr remains elevated at 1.4. wonder if this is his baseline.   - Will stop IVF if adequate PO intake as patient developing mild edema  - Avoid nephrotoxins  - AM BMP    Colonic mass, ruled out  Previous colonoscopy '21 unremarkable, though had 4mm tubular adenoma in transverse colon on colonoscopy '15.   Colonoscopy on 6/26 showed a normal colon.     Bilateral pulmonary nodules  Possible metastatic disease vs infectious or inflammatory.   - Follow up CT chest in 3 months pending above workup    Tobacco use disorder - daily smoker, encourage cessation,           Diet: Clear Liquid Diet    DVT Prophylaxis: Pneumatic Compression Devices  Maki Catheter: Not present  Lines: None     Cardiac Monitoring: ACTIVE order. Indication: Procedural area  Code Status: Full Code      Clinically Significant Risk Factors        # Hypokalemia: Lowest K = 3.2 mmol/L in last 2 days, will replace as needed   # Hyperchloremia: Highest Cl = 109 mmol/L in last 2 days, will monitor as appropriate       # Hypercalcemia: Highest Ca = 10.9 mg/dL in last 2 days, will monitor as appropriate    # Hypoalbuminemia: Lowest albumin = 2.2 g/dL at 6/26/2025  7:09 AM, will monitor as appropriate                           Social Drivers of Health    Tobacco Use: Medium Risk (6/26/2025)    Patient History     Smoking Tobacco Use: Former     Smokeless Tobacco Use: Never   Alcohol Use: Unknown (3/4/2021)    AUDIT-C     Frequency of Alcohol Consumption: 2-4 times a month     Average Number of Drinks: Not asked     Frequency of Binge Drinking: Not asked           Disposition Plan     Medically Ready for Discharge: Anticipated in 2-4 Days             Jane Rhodes DO  Hospitalist Service  [facility]  Securely message with [employer] (more info)  Text page via [employer] Paging/Directory   ______________________________________________________________________    Interval History   No acute overnight events.  Feeling ok     Physical Exam   Vital Signs: Temp: 98.4  F (36.9  C) Temp src: Oral BP: 107/49 Pulse: 68   Resp: 16 SpO2: 95 % O2 Device: None (Room air) Oxygen Delivery: 5 LPM  Weight: 124 lbs 4.8 oz    Constitutional: Awake, alert, no distress, and cooperative  Cardiovascular: Regular rate and rhythm, normal S1 and S2, no S3 or S4, and no murmur noted  Respiratory: No increased work of breathing, good air exchange, clear to auscultation bilaterally, no crackles or wheezing  Gastrointestinal: Abdomen soft, non-tender, non-distended. BS normal. No masses, organomegaly     Medical Decision Making       45 MINUTES SPENT BY ME on the date of service doing chart review, history, exam, documentation & further activities per the note.      Data     I have personally reviewed the following data over the past 24 hrs:    14.1 (H)  \   9.7 (L)   / 269     141 107 38.0 (H) /  109 (H)   3.3 (L) 24 1.47 (H) \     ALT: 104 (H) AST: 101 (H) AP: 352 (H) TBILI: 26.4 (HH)   ALB: 2.5 (L) TOT PROTEIN: 4.3 (L) LIPASE: N/A

## 2025-06-27 NOTE — PROGRESS NOTES
"    GASTROENTEROLOGY PROGRESS NOTE    CC: elevated LFTs, liver masses    SUBJECTIVE:  Feeling well today. No abdominal pain. Ate jello and an omelette with no nausea or vomiting. Had a bowel movement today that was somewhat clear.    OBJECTIVE:  General Appearance:  Lying in bed, NAD.  /49 (BP Location: Left arm)   Pulse 68   Temp 98.4  F (36.9  C) (Oral)   Resp 16   Ht 1.727 m (5' 8\")   Wt 56.4 kg (124 lb 4.8 oz)   SpO2 95%   BMI 18.90 kg/m      Patient Vitals for the past 72 hrs:   Weight   06/24/25 1500 56.4 kg (124 lb 4.8 oz)       PHYSICAL EXAM    EYES: Jaundice  RESPIRATORY: breathing unlabored  GASTROINTESTINAL: nondistended, soft, nontender  SKIN/HAIR/NAILS: Jaundice  NEUROLOGIC: Alert and oriented      Additional Comments:  ROS, FH, SH: See initial GI consult for details.    I have reviewed the patient's new clinical lab results:    Recent Labs   Lab Test 06/27/25  0647 06/26/25  0709 06/25/25  0711 06/24/25  0642   WBC 14.1* 12.8* 12.5* 14.5*   HGB 9.7* 10.2* 10.5* 11.3*   MCV 91 91 91 91    284 309 277   INR  --  1.14  --  1.09     Recent Labs   Lab Test 06/27/25  0647 06/26/25  0709 06/25/25  2110 06/25/25  0711   POTASSIUM 3.2* 3.6  3.6 3.2* 2.9*   CHLORIDE 107 109*  --  106   CO2 24 23  --  23   BUN 38.0* 44.8*  --  51.4*   ANIONGAP 10 8  --  10     Recent Labs   Lab Test 06/27/25  0647 06/26/25  0709 06/25/25  0711 06/24/25  0833 06/24/25  0642   ALBUMIN 2.5* 2.2* 2.2*  2.2*  --  3.0*   BILITOTAL 26.4* 25.4* 26.6*  26.6*  --  33.4*   * 110* 115*  115*  --  140*   * 110* 118*  118*  --  135*   PROTEIN  --   --   --  10*  --    LIPASE  --   --   --   --  31         IMAGING     CTCAP w/o contrast 6/24/25  IMPRESSION:  1.  Findings worrisome for malignancy within the liver. The liver appears heterogeneous with suggestion of underlying hepatic masses primarily at the right liver. There is severe left worse than right intrahepatic biliary ductal dilatation with cutoff " of the biliary tree at the central liver. Recommend correlation with hepatic MRI and MRCP.  2.  Indeterminate focal region of wall thickening and decompression at the mid ascending colon. This should be considered a colonic mass until proven otherwise. Recommend correlation with colonoscopy.  3.  Several indeterminate pulmonary nodules bilaterally. Metastatic disease is possible. This could also relate to an atypical infectious or inflammatory etiology. Recommend follow-up short interval CT chest in 3 months.  4.  Small ascites. A few borderline prominent upper abdominal lymph nodes are suggested but these are limited in view.  5.  Technically indeterminate small sclerosis at the left ischial tuberosity.  6.  Mild anasarca.     Abd US 6/24/25  IMPRESSION:  1.  Moderate to severe intrahepatic biliary ductal dilatation in the left hepatic lobe. The common duct is only visualized at the hepatic hilum where it is dilated to 12 mm. Consider MRCP for further evaluation.  2.  Heterogeneous liver with multiple indeterminate hypoechoic lesions in the liver. The lesions are better seen on the noncontrast CT performed today. These are indeterminate but suspicious for malignancy. These can also be evaluated on the MRCP.  3.  Small ascites.     US Paracentesis 6/24/25  FINDINGS: Limited abdominal ultrasound demonstrates trace volume intraperitoneal ascites, not amenable for safe percutaneous drainage. No paracentesis performed.     ENDOSCOPIC EVALUATION     EUS 6/26/25  Impression:                 - Innumerable hepatic masses with obstruction of the bile duct at the hilum. Fine-needle aspiration as above.     -The liver showed innumerable vague, isoechoic masses, measuring up to 20        x 18 mm in size. Fine-needle aspiration of these were performed with a        25-gauge needle. 5 passes were made using a transgastric trajectory and        color Doppler to avoid vessels. Specimen sent for preliminary pathology         evaluation.     ERCP 6/26/25  Impression:                - Biliary obstruction at the hilum secondary to masses seen on EUS. 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  Sphincterotomy, stenting as above.     Colonoscopy 6/26/25  Impression:                 - The entire examined colon is normal on direct and retroflexion views.   Recommendation:             - [facility] for [facility] care.   - Clear liquid diet today.   - Await pathology results.   - Follow symptoms and LFTs   - Stent exchange in 2 months.       ASSESSMENT AND PLAN     Active Problems:    Elevated LFTs    Liver masses    Possible colon mass    1. Elevated LFTs, liver mass: Likely malignancy. No known history of liver disease. No recent ETOH use but previously ~6 drinks/week for years.  Labs on admit: Tbili 33.4, , , alk phos 489. WBC 14.5, Hgb 11.3. INR 1.09. He presented with PIYUSH with Cr 2.24, BUN 52.3, and GFR 33. Normal lipase.     CTCAP showed liver masses suggestive of malignancy and severe intrahepatic biliary ductal dilation with cutoff at the central liver. This was redemonstrated on ultrasound.    EUS with FNA demonstrated innumerable hepatic masses. ERCP yesterday demonstrated biliary obstruction at the hilum secondary to masses as seen on EUS. Findings concerning for malignancy, particularly Klatskin-type cholangiocarcinoma. Pathology pending.     AST/ALT mildly decreased from yesterday. Tbili 25.4 --> 26.4. It will take time for this level of elevation to decrease now that stent is in place. AFP, CEA, and CA 19-9 normal. Hep B and C negative. WBC increased today. He has remained afebrile.    Steroids not completed as this is not alcoholic hepatitis, and there is concern for infection with leukocytosis. Consider blood cultures. Paracentesis not performed as unsafe for percutaneous draining.       2. Possible colon mass: Noted on admission  CT. Previous colonoscopy in 2021 unremarkable, though on colonoscopy in 2015 had a 4mm tubular adenoma in the transverse colon. Weight loss of 40 lbs in last 4 months associated with decreased appetite. Increased constipation recently, but otherwise no alarm symptoms indicative of colon cancer. CEA normal.      Colonoscopy yesterday was entirely normal.        PLAN:  -Regular diet  -Await pathology  -Follow LFTs and WBC  -Stent exchange in 2 months    GI will follow peripherally over the weekend, will await path. Will see patient on Monday.    20 minutes of total time was spent providing patient care, including patient evaluation, reviewing documentation/test results, and .     I will update Dr. Ramirez, GI staff  Lisa Sneed, AILINS, PA-C  [facility]  [phone]

## 2025-06-27 NOTE — PLAN OF CARE
"/51 (BP Location: Left arm)   Pulse 74   Temp 98.4  F (36.9  C) (Oral)   Resp 16   Ht 1.727 m (5' 8\")   Wt 56.4 kg (124 lb 4.8 oz)   SpO2 96%   BMI 18.90 kg/m      VSS, PT denies pain, SoB, CP. Lethargic this shift, but arousable. Ambulates well with SBA. Poor intake, continued IVF. Hem/Onc consult pending.   "

## 2025-06-27 NOTE — PROGRESS NOTES
GI chart check:  Signing off, but please call us with any questions or concerns.  Consult oncology when path returns.    MD [facility]

## 2025-06-27 NOTE — PLAN OF CARE
"AOx4. Pt denies pain and N/V. Clear liquid diet. SBA Tele: SR w/prolonged QT/Qtc. On RA diminished lung sounds. K, Mg, and Phos protocol. LR running at 125 ml/hr. Discharge TBD.  Goal Outcome Evaluation:      Plan of Care Reviewed With: patient and family    Overall Patient Progress: improvingOverall Patient Progress: improving    Outcome Evaluation: Pt alert and oriented x4. Advanced to clear liquid diet.    Problem: Adult Inpatient Plan of Care  Goal: Plan of Care Review  Description: The Plan of Care Review/Shift note should be completed every shift.  The Outcome Evaluation is a brief statement about your assessment that the patient is improving, declining, or no change.  This information will be displayed automatically on your shift  note.  Outcome: Progressing  Flowsheets (Taken 6/27/2025 0614)  Outcome Evaluation: Pt alert and oriented x4. Advanced to clear liquid diet.  Plan of Care Reviewed With: patient  Overall Patient Progress: improving  Goal: Patient-Specific Goal (Individualized)  Description: You can add care plan individualizations to a care plan. Examples of Individualization might be:  \"Parent requests to be called daily at 9am for status\", \"I have a hard time hearing out of my right ear\", or \"Do not touch me to wake me up as it startles  me\".  Outcome: Progressing  Goal: Absence of Hospital-Acquired Illness or Injury  Outcome: Progressing  Intervention: Identify and Manage Fall Risk  Recent Flowsheet Documentation  Taken 6/26/2025 2111 by Jun Jean RN  Safety Promotion/Fall Prevention: safety round/check completed  Taken 6/26/2025 1920 by Jun Jean RN  Safety Promotion/Fall Prevention: safety round/check completed  Intervention: Prevent and Manage VTE (Venous Thromboembolism) Risk  Recent Flowsheet Documentation  Taken 6/26/2025 2025 by Jun Jean RN  VTE Prevention/Management: patient refused intervention  Goal: Optimal Comfort and Wellbeing  Outcome: " Progressing  Goal: Readiness for Transition of Care  Outcome: Progressing

## 2025-06-27 NOTE — CONSULTS
"CLINICAL NUTRITION SERVICES - ASSESSMENT NOTE    RECOMMENDATIONS FOR MDs/PROVIDERS TO ORDER:  None at this time    Registered Dietitian Interventions:  Oral intake encouragement appreciated   Intake documentation also appreciated  Will continue to follow and provide further interventions pending clinical course.        REASON FOR ASSESSMENT  Positive admission nutrition risk screen    PMH: tobacco use disorder     Patient admitted for abdominal pain.   Presumed to have metastatic malignancy (hepatobiliary vs pancreatic source). Also found to have multiple liver lesions, intrahepatic biliary ductal dilation, colonic mass, transaminitis, PIYUSH, and bilateral pulmonary nodules.     Coloscopy and ERCP on 6/26.     INFORMATION OBTAINED  Completed assessment via chart review.     NUTRITION HISTORY  Will obtain as appropriate.     Per MD note: Patient has a history of ~6 drinks/week for many years. Endorses a several month history of abdominal pain, early satiety, fatigue, dizziness, and a 40 lb weight loss.     CURRENT NUTRITION ORDERS  Diet: Regular    CURRENT INTAKE/TOLERANCE  No intakes are documented, limited timeframe of admission.   Patient was NPO x2 days, allowed CLD the afternoon of 6/26. Diet advanced to regular 6/27.     LABS  Nutrition-relevant labs: K 3.2, BUN 38.0, Cr 1.47, Alk Phos 352, , , bilirubin 26.4    MEDICATIONS  Nutrition-relevant medications: potassium chloride, senokot, LR @ 125 mL/hr    ANTHROPOMETRICS  Height: 172.7 cm (5' 8\")  Admission Weight: 59 kg (130 lb 1.1 oz) (06/24/25 0628)   Most Recent Weight: 56.4 kg (124 lb 4.8 oz) (06/24/25 1500)  IBW: 154 lbs  BMI: Body mass index is 18.9 kg/m .   Weight History: no other CE or chart information to reference.  Per chart review of pt report, -24% of weight in the past 6 months.   Wt Readings from Last 20 Encounters:   06/24/25 56.4 kg (124 lb 4.8 oz)   04/06/21 83.9 kg (185 lb)   03/04/21 85.3 kg (188 lb)        ASSESSED NUTRITION " "NEEDS  Dosing Weight: 56.4 kg, based on actual wt (standing scale)  Estimated Energy Needs: 6482-9397 kcals/day (30 - 35 kcals/kg)  Justification: Underweight, increased needs  Estimated Protein Needs: 68-85 grams protein/day (1.2 - 1.5 grams of pro/kg)  Justification: Increased needs  Estimated Fluid Needs: Per provider pending fluid status    SYSTEM AND PHYSICAL FINDINGS    GI: 4 BM noted 6/25    Skin/wounds: anasarca noted, small volume ascites     MALNUTRITION  % Intake: </=75% for >/= 1 month (severe), suspected given low BMI and reported wt loss  % Weight Loss: >10% in 6 months, per pt report   Subcutaneous Fat Loss: did not assess  Muscle Loss: did not assess, suspect losses with current BMI  Fluid Accumulation/Edema: anasarca noted  Malnutrition Diagnosis: At least moderate malnutrition,  per information on chart review. Technically meets severe criteria but would like to interview the patient when appropriate before official diagnosis.    Malnutrition Present on Admission: Yes    NUTRITION DIAGNOSIS  Predicted inadequate nutrient intake   related to presumed new diagnosis of metastatic disease     INTERVENTIONS  See nutrition interventions above    GOALS  Patient to consume % of nutritionally adequate meal trays TID, or the equivalent with supplements/snacks.     MONITORING/EVALUATION  Progress toward goals will be monitored and evaluated per policy.      Yesenia Wagner, MS, RD, LD  [facility]: \"Dietitian [[facility]]\"  Office Phone: [phone]      "

## 2025-06-28 LAB
ALBUMIN SERPL BCG-MCNC: 2.3 G/DL (ref 3.5–5.2)
ALP SERPL-CCNC: 370 U/L (ref 40–150)
ALT SERPL W P-5'-P-CCNC: 116 U/L (ref 0–70)
ANION GAP SERPL CALCULATED.3IONS-SCNC: 6 MMOL/L (ref 7–15)
AST SERPL W P-5'-P-CCNC: 121 U/L (ref 0–45)
BILIRUB SERPL-MCNC: 25.1 MG/DL
BUN SERPL-MCNC: 38.3 MG/DL (ref 8–23)
CALCIUM SERPL-MCNC: 10.5 MG/DL (ref 8.8–10.4)
CHLORIDE SERPL-SCNC: 110 MMOL/L (ref 98–107)
CREAT SERPL-MCNC: 1.36 MG/DL (ref 0.67–1.17)
EGFRCR SERPLBLD CKD-EPI 2021: 59 ML/MIN/1.73M2
ERYTHROCYTE [DISTWIDTH] IN BLOOD BY AUTOMATED COUNT: 24.4 % (ref 10–15)
GLUCOSE SERPL-MCNC: 96 MG/DL (ref 70–99)
HCO3 SERPL-SCNC: 24 MMOL/L (ref 22–29)
HCT VFR BLD AUTO: 27.7 % (ref 40–53)
HGB BLD-MCNC: 10.4 G/DL (ref 13.3–17.7)
MAGNESIUM SERPL-MCNC: 2.2 MG/DL (ref 1.7–2.3)
MCH RBC QN AUTO: 34.8 PG (ref 26.5–33)
MCHC RBC AUTO-ENTMCNC: 37.5 G/DL (ref 31.5–36.5)
MCV RBC AUTO: 93 FL (ref 78–100)
PHOSPHATE SERPL-MCNC: 1.6 MG/DL (ref 2.5–4.5)
PHOSPHATE SERPL-MCNC: 2.6 MG/DL (ref 2.5–4.5)
PLATELET # BLD AUTO: 236 10E3/UL (ref 150–450)
POTASSIUM SERPL-SCNC: 3.7 MMOL/L (ref 3.4–5.3)
PROT SERPL-MCNC: 4.2 G/DL (ref 6.4–8.3)
RBC # BLD AUTO: 2.99 10E6/UL (ref 4.4–5.9)
SODIUM SERPL-SCNC: 140 MMOL/L (ref 135–145)
WBC # BLD AUTO: 13.7 10E3/UL (ref 4–11)

## 2025-06-28 PROCEDURE — 85027 COMPLETE CBC AUTOMATED: CPT | Performed by: STUDENT IN AN ORGANIZED HEALTH CARE EDUCATION/TRAINING PROGRAM

## 2025-06-28 PROCEDURE — 97161 PT EVAL LOW COMPLEX 20 MIN: CPT | Mod: GP

## 2025-06-28 PROCEDURE — 80053 COMPREHEN METABOLIC PANEL: CPT | Performed by: STUDENT IN AN ORGANIZED HEALTH CARE EDUCATION/TRAINING PROGRAM

## 2025-06-28 PROCEDURE — 258N000003 HC RX IP 258 OP 636: Performed by: STUDENT IN AN ORGANIZED HEALTH CARE EDUCATION/TRAINING PROGRAM

## 2025-06-28 PROCEDURE — 83735 ASSAY OF MAGNESIUM: CPT | Performed by: STUDENT IN AN ORGANIZED HEALTH CARE EDUCATION/TRAINING PROGRAM

## 2025-06-28 PROCEDURE — 120N000001 HC R&B MED SURG/OB

## 2025-06-28 PROCEDURE — 84100 ASSAY OF PHOSPHORUS: CPT | Performed by: STUDENT IN AN ORGANIZED HEALTH CARE EDUCATION/TRAINING PROGRAM

## 2025-06-28 PROCEDURE — 250N000013 HC RX MED GY IP 250 OP 250 PS 637: Performed by: NURSE PRACTITIONER

## 2025-06-28 PROCEDURE — 250N000009 HC RX 250: Performed by: STUDENT IN AN ORGANIZED HEALTH CARE EDUCATION/TRAINING PROGRAM

## 2025-06-28 PROCEDURE — 97116 GAIT TRAINING THERAPY: CPT | Mod: GP

## 2025-06-28 PROCEDURE — 36415 COLL VENOUS BLD VENIPUNCTURE: CPT | Performed by: STUDENT IN AN ORGANIZED HEALTH CARE EDUCATION/TRAINING PROGRAM

## 2025-06-28 PROCEDURE — 99232 SBSQ HOSP IP/OBS MODERATE 35: CPT | Performed by: STUDENT IN AN ORGANIZED HEALTH CARE EDUCATION/TRAINING PROGRAM

## 2025-06-28 PROCEDURE — 250N000013 HC RX MED GY IP 250 OP 250 PS 637: Performed by: STUDENT IN AN ORGANIZED HEALTH CARE EDUCATION/TRAINING PROGRAM

## 2025-06-28 RX ORDER — MIRTAZAPINE 15 MG/1
15 TABLET, FILM COATED ORAL AT BEDTIME
Status: DISCONTINUED | OUTPATIENT
Start: 2025-06-28 | End: 2025-07-10 | Stop reason: HOSPADM

## 2025-06-28 RX ADMIN — SODIUM CHLORIDE, SODIUM LACTATE, POTASSIUM CHLORIDE, AND CALCIUM CHLORIDE: .6; .31; .03; .02 INJECTION, SOLUTION INTRAVENOUS at 01:32

## 2025-06-28 RX ADMIN — MIRTAZAPINE 15 MG: 15 TABLET, FILM COATED ORAL at 20:54

## 2025-06-28 RX ADMIN — Medication 15 MMOL: at 08:58

## 2025-06-28 RX ADMIN — SENNOSIDES AND DOCUSATE SODIUM 1 TABLET: 50; 8.6 TABLET ORAL at 20:53

## 2025-06-28 ASSESSMENT — ACTIVITIES OF DAILY LIVING (ADL)
ADLS_ACUITY_SCORE: 45

## 2025-06-28 NOTE — PLAN OF CARE
"Pt lethargic. Arouses to voice. Oriented x 4. Family remains at bedside. Had shower today. Phos replaced per protocol. Awaiting biopsy results. Follow up with oncology outpatient. Nutrition consulted. Remeron added on for appetite stimulant. Discharge date TBD.      Goal Outcome Evaluation:      Plan of Care Reviewed With: patient    Overall Patient Progress: no changeOverall Patient Progress: no change    Outcome Evaluation: poor appetite. on IVF. SBA. biopsy pending.      Problem: Adult Inpatient Plan of Care  Goal: Plan of Care Review  Description: The Plan of Care Review/Shift note should be completed every shift.  The Outcome Evaluation is a brief statement about your assessment that the patient is improving, declining, or no change.  This information will be displayed automatically on your shift  note.  Outcome: Progressing  Flowsheets (Taken 6/28/2025 1132)  Outcome Evaluation: poor appetite. on IVF. SBA. biopsy pending.  Plan of Care Reviewed With: patient  Overall Patient Progress: no change  Goal: Patient-Specific Goal (Individualized)  Description: You can add care plan individualizations to a care plan. Examples of Individualization might be:  \"Parent requests to be called daily at 9am for status\", \"I have a hard time hearing out of my right ear\", or \"Do not touch me to wake me up as it startles  me\".  Outcome: Progressing  Goal: Absence of Hospital-Acquired Illness or Injury  Outcome: Progressing  Intervention: Identify and Manage Fall Risk  Recent Flowsheet Documentation  Taken 6/28/2025 0911 by Lili Escobedo, RN  Safety Promotion/Fall Prevention: safety round/check completed  Taken 6/28/2025 0730 by Lili Escobedo, RN  Safety Promotion/Fall Prevention: safety round/check completed  Intervention: Prevent Skin Injury  Recent Flowsheet Documentation  Taken 6/28/2025 0857 by Lili Escobedo, RN  Body Position: position changed independently  Intervention: Prevent and Manage VTE (Venous Thromboembolism) " Risk  Recent Flowsheet Documentation  Taken 6/28/2025 0911 by Lili Escobedo, RN  VTE Prevention/Management: patient refused intervention  Goal: Optimal Comfort and Wellbeing  Outcome: Progressing  Goal: Readiness for Transition of Care  Outcome: Progressing

## 2025-06-28 NOTE — CONSULTS
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      PLAN:  - Await biopsy result  - Will order outpatient PET scan  - Discussed that if cholangiocarcinoma diagnosis is confirmed, the disease would not be considered curable but treatable, and the goal of treatment is to help him live longer with better quality of life.  - Standard first line treatment is gemcitabine/cisplatin/pembrolizumab, but would need to wait for his liver function tests to get better first  - I will set up an outpatient visit in about a week to go over his biopsy and PET scan result, and to further discuss systemic therapy.    Paul Robreto M.D.  [facility]  [phone]             Chief Complaint:   Newly diagnosed probable metastatic cholangiocarcinoma         History of Present Illness:   60 year old gentleman with poor appetite since Feb 2025 leading to 30-40 lb weight loss and development of jaundice 2 weeks prior to hospital stay.  No nausea or vomiting.  He has had constipation over the past month.  CT CAP showed innumerable liver masses associated with intrahepatic biliary ductal dilatation, R>L.  There were also several indeterminate lung nodules measuring up to 1.4 cm.  Thickening of the mid ascending colon wall was noted.    This led to an MRI which showed massive intrahepatic ductal dilatation. Mildly T2 hyperintense somewhat ill-defined mass at the biliary confluence extending somewhat into the right hepatic lobe. There are bilobar hepatic metastases present. Additionally, there is a  ill-defined   mildly T2 hyperintense mass at the pancreatic tail abutting and possibly involving the spleen and anterior aspect of the left kidney.  Heterogeneous appearance of the spleen, which may represent metastatic involvement and/or infarcts related to involvement by adjacent pancreatic mass.     Underwent EUS on 25.  This showed innumerable hepatic masses with obstruction of the bile duct at the hilum.   No pancreatic mass noted.  Underwent ERCP with stent placement.    Colonoscopy negative.    CEA elevated to 43.  CA 19-9 and AFP WNL.                Past Medical History:   History reviewed. No pertinent past medical history.          Past Surgical History:     Past Surgical History:   Procedure Laterality Date    COLONOSCOPY      COLONOSCOPY N/A 2021    Procedure: COLONOSCOPY ();  Surgeon: Ambrose Vargas MD;  Location:  GI    COLONOSCOPY N/A 2025    Procedure: Colonoscopy;  Surgeon: Ramon Colbert MD;  Location:  OR    ENDOSCOPIC RETROGRADE CHOLANGIOPANCREATOGRAM N/A 2025    Procedure: Endoscopic retrograde cholangiopancreatogram, sphincterotomy with stent placement x2;  Surgeon: Ramon Colbert MD;  Location:  OR    ESOPHAGOSCOPY, GASTROSCOPY, DUODENOSCOPY (EGD), COMBINED N/A 2025    Procedure: ESOPHAGOGASTRODUODENOSCOPY, WITH FINE NEEDLE ASPIRATION BIOPSY, ENDOSCOPIC ULTRASOUND GUIDANCE;  Surgeon: Ramon Colbert MD;  Location:  OR               Social History:     Social History     Tobacco Use    Smoking status: Former     Current packs/day: 0.00     Average packs/day: 1 pack/day for 41.0 years (41.0 ttl pk-yrs)     Types: Cigarettes     Start date:      Quit date: 2020     Years since quittin.5    Smokeless tobacco: Never   Substance Use Topics    Alcohol use: Yes     Comment: 6 per week             Family History:     Family History   Problem Relation Age of Onset    Colon Cancer No family hx of               Medications:     No medications prior to admission.             Review of Systems:     The 10 point Review of Systems is negative other than noted in the HPI            Physical Exam:   Vitals were reviewed  Temp: 98.4  F (36.9  C) Temp src: Oral BP: 111/51 Pulse: 74   Resp: 16 SpO2: 96 % O2 Device: None (Room air)    General: Awake, alert and oriented  Skin: Jaundiced  Eyes: Scleral icterus  Heart: RRR.  No murmurs  Lungs: Clear  Abd: S, NT, ND  Ext: No edema       Data:     Lab Results   Component Value Date    WBC 14.1 (H) 06/27/2025    HGB 9.7 (L) 06/27/2025    HCT 26.2 (L) 06/27/2025     06/27/2025     06/27/2025    POTASSIUM 3.3 (L) 06/27/2025    CHLORIDE 107 06/27/2025    CO2 24 06/27/2025    BUN 38.0 (H) 06/27/2025    CR 1.47 (H) 06/27/2025     (H) 06/27/2025     (H) 06/27/2025     (H) 06/27/2025    ALKPHOS 352 (H) 06/27/2025    BILITOTAL 26.4 (HH) 06/27/2025    INR 1.14 06/26/2025

## 2025-06-28 NOTE — CONSULTS
CLINICAL NUTRITION SERVICES - ASSESSMENT NOTE    Registered Dietitian Interventions:  Oral intake encouragement appreciated - Encouraged small frequent meals/snacks as tolerated to stimulate appetite and increase intake. Emphasized importance of maintaining/gaining weight. Reviewed high calorie foods to include with meals.     Offered oral nutrition supplements which patient was agreeable to have. Ordered Ensure (vanilla) TID.     Discussed foods to help with constipation     Will continue to follow and monitor tolerance of oral intake.        REASON FOR ASSESSMENT  Provider order - malnutrition assessment    Nutrition is already following patient. Unable to meet with patient on 6/27, was able to meet with patient and family today.     PMH: tobacco use disorder      Patient admitted for abdominal pain.   Presumed to have metastatic malignancy (hepatobiliary vs pancreatic source). Also found to have multiple liver lesions, intrahepatic biliary ductal dilation, colonic mass, transaminitis, PIYUSH, and bilateral pulmonary nodules.      Coloscopy and ERCP on 6/26. Awaiting biopsy results.     INFORMATION OBTAINED  Assessed patient in room. Family also present.     NUTRITION HISTORY  Patient reports to have had very poor appetite since February of this year. He has been eating one meal/day which is his baseline, expect they are much smaller in size. Reports chronic constipation as well - has been trying to eat more fruits and vegetables and less meats  to help relieve constipation. Does not use oral nutrition supplements at baseline.   Reports ~40 lb weight loss since February.     CURRENT NUTRITION ORDERS  Diet: Regular    CURRENT INTAKE/TOLERANCE  50% intakes are documented.  Patient has ordered 3 meal trays during admission so far, containing mostly liquid items like broth and jello.     LABS  Nutrition-relevant labs: Reviewed    MEDICATIONS  Nutrition-relevant medications: Reviewed    ANTHROPOMETRICS  Height: 172.7 cm  "(5' 8\")  Admission Weight: 59 kg (130 lb 1.1 oz) (06/24/25 0628)   Most Recent Weight: 56.4 kg (124 lb 4.8 oz) (06/24/25 1500)  IBW: 154 lbs  BMI: Body mass index is 18.9 kg/m .   Weight History: no other CE or chart information to reference. Per chart review of pt report, -24% of weight in the past 6 months.   Wt Readings from Last 20 Encounters:   06/24/25 56.4 kg (124 lb 4.8 oz)   04/06/21 83.9 kg (185 lb)   03/04/21 85.3 kg (188 lb)        ASSESSED NUTRITION NEEDS  Dosing Weight: 56.4 kg, based on actual wt (standing scale)  Estimated Energy Needs: 6585-8024 kcals/day (30 - 35 kcals/kg)  Justification: Underweight, increased needs  Estimated Protein Needs: 68-85 grams protein/day (1.2 - 1.5 grams of pro/kg)  Justification: Increased needs  Estimated Fluid Needs: Per provider pending fluid status    SYSTEM AND PHYSICAL FINDINGS    GI: 1 BM noted today     Skin/wounds: 1-2+ edema noted in feet and L hand.     MALNUTRITION  % Intake: </=50% for >/= 1 month (severe)  % Weight Loss: > 10% in 6 months (severe)   Subcutaneous Fat Loss: Orbital: Severe and Buccal: Severe - visual, pt covered in blankets  Muscle Loss: Temples (temporalis muscle): Severe and Clavicles (pectoralis and deltoids): Severe - visual, pt covered in blankets  Fluid Accumulation/Edema: Does not meet criteria  Malnutrition Diagnosis: Severe malnutrition in the context of chronic illness  Malnutrition Present on Admission: Yes    NUTRITION DIAGNOSIS  Inadequate oral intake related to new possible metastatic disease as evidenced by pt report of intake, unintentional weight loss, and severe muscle/fat depletions.     INTERVENTIONS  Medical food supplement therapy  Nutrition counseling strategies    GOALS  Patient to consume % of nutritionally adequate meal trays TID, or the equivalent with supplements/snacks.     MONITORING/EVALUATION  Progress toward goals will be monitored and evaluated per policy.      Yesenia Wagner, MS, RD, LD  Vocera Message " "Group: \"Dietitian [[facility]]\"  Office Phone: [phone]    "

## 2025-06-28 NOTE — PLAN OF CARE
"  A/Ox4. Poor appetit, nutrition consulted. LR 125ml/hr, ok to stop if appetite improve. SBA. Reg diet. Mg, K, Ph protocol. Biopsy result pending. Oncology consulted.      Goal Outcome Evaluation:      Plan of Care Reviewed With: patient    Overall Patient Progress: improvingOverall Patient Progress: improving    Outcome Evaluation: A/Ox4. Poor appetit, nutrition consulted. LR 125ml/hr, ok to stop if appetite improve. SBA. Reg diet. Mg, K, Ph protocol. Biopsy result pending. Oncology consulted.      Problem: Adult Inpatient Plan of Care  Goal: Plan of Care Review  Description: The Plan of Care Review/Shift note should be completed every shift.  The Outcome Evaluation is a brief statement about your assessment that the patient is improving, declining, or no change.  This information will be displayed automatically on your shift  note.  Outcome: Progressing  Flowsheets (Taken 6/28/2025 0601)  Outcome Evaluation: A/Ox4. Poor appetit, nutrition consulted. LR 125ml/hr, ok to stop if appetite improve. SBA. Reg diet. Mg, K, Ph protocol. Biopsy result pending. Oncology consulted.  Plan of Care Reviewed With: patient  Overall Patient Progress: improving  Goal: Patient-Specific Goal (Individualized)  Description: You can add care plan individualizations to a care plan. Examples of Individualization might be:  \"Parent requests to be called daily at 9am for status\", \"I have a hard time hearing out of my right ear\", or \"Do not touch me to wake me up as it startles  me\".  Outcome: Progressing  Goal: Absence of Hospital-Acquired Illness or Injury  Outcome: Progressing  Intervention: Identify and Manage Fall Risk  Recent Flowsheet Documentation  Taken 6/27/2025 2000 by Dot Chavez, RN  Safety Promotion/Fall Prevention:   safety round/check completed   clutter free environment maintained   nonskid shoes/slippers when out of bed   patient and family education  Intervention: Prevent Skin Injury  Recent Flowsheet " Documentation  Taken 6/27/2025 2131 by Dot Chavez RN  Body Position: position changed independently  Taken 6/27/2025 2000 by Dot Chavez RN  Body Position: position changed independently  Intervention: Prevent Infection  Recent Flowsheet Documentation  Taken 6/27/2025 2000 by Dot Chavez RN  Infection Prevention: rest/sleep promoted  Goal: Optimal Comfort and Wellbeing  Outcome: Progressing  Intervention: Monitor Pain and Promote Comfort  Recent Flowsheet Documentation  Taken 6/27/2025 2000 by Dot Chavez RN  Pain Management Interventions: pain management plan reviewed with patient/caregiver  Goal: Readiness for Transition of Care  Outcome: Progressing     Problem: Fall Injury Risk  Goal: Absence of Fall and Fall-Related Injury  Outcome: Progressing  Intervention: Identify and Manage Contributors  Recent Flowsheet Documentation  Taken 6/27/2025 2000 by Dot Chavez RN  Medication Review/Management: medications reviewed  Intervention: Promote Injury-Free Environment  Recent Flowsheet Documentation  Taken 6/27/2025 2000 by Dot Chavez RN  Safety Promotion/Fall Prevention:   safety round/check completed   clutter free environment maintained   nonskid shoes/slippers when out of bed   patient and family education     Problem: Acute Kidney Injury/Impairment  Goal: Fluid and Electrolyte Balance  Outcome: Progressing  Intervention: Monitor and Manage Fluid and Electrolyte Balance  Recent Flowsheet Documentation  Taken 6/27/2025 2000 by Dot Chavez RN  Fluid/Electrolyte Management: fluids provided  Goal: Improved Oral Intake  Outcome: Progressing  Goal: Effective Renal Function  Outcome: Progressing  Intervention: Monitor and Support Renal Function  Recent Flowsheet Documentation  Taken 6/27/2025 2000 by Dot Chavez RN  Medication Review/Management: medications reviewed     Problem: Liver Failure  Goal: Optimal Coping with Liver Failure  Outcome: Progressing  Goal: Absence of  Bleeding  Outcome: Progressing  Goal: Fluid and Electrolyte Balance  Outcome: Progressing  Intervention: Monitor and Manage Fluid and Electrolyte Balance  Recent Flowsheet Documentation  Taken 6/27/2025 2000 by Dot Chavez RN  Fluid/Electrolyte Management: fluids provided  Goal: Optimal Gastrointestinal Function  Outcome: Progressing  Intervention: Monitor and Support Gastrointestinal Function  Recent Flowsheet Documentation  Taken 6/27/2025 2000 by Dot Chavez RN  Fluid/Electrolyte Management: fluids provided  Goal: Blood Glucose Level Within Target Range  Outcome: Progressing  Goal: Hemodynamic Stability  Outcome: Progressing  Goal: Absence of Infection Signs and Symptoms  Outcome: Progressing  Goal: Optimal Neurologic Function  Outcome: Progressing  Intervention: Monitor and Optimize Neurologic Status  Recent Flowsheet Documentation  Taken 6/27/2025 2131 by Dot Chavez RN  Head of Bed (HOB) Positioning: HOB at 20-30 degrees  Taken 6/27/2025 2000 by Dot Chavez RN  Head of Bed (HOB) Positioning: HOB at 20-30 degrees  Goal: Improved Oral Intake  Outcome: Progressing  Goal: Optimal Pain Control, Comfort and Function  Outcome: Progressing  Intervention: Prevent or Manage Pain  Recent Flowsheet Documentation  Taken 6/27/2025 2000 by Dot Chavez RN  Pain Management Interventions: pain management plan reviewed with patient/caregiver  Goal: Effective Oxygenation and Ventilation  Outcome: Progressing  Intervention: Promote Airway Secretion Clearance  Recent Flowsheet Documentation  Taken 6/27/2025 2131 by Dot Chavez RN  Activity Management:   ambulated to bathroom   back to bed  Taken 6/27/2025 2000 by Dot Chavez RN  Cough And Deep Breathing: done independently per patient  Activity Management:   activity adjusted per tolerance   ambulated to bathroom   back to bed  Intervention: Optimize Oxygenation and Ventilation  Recent Flowsheet Documentation  Taken 6/27/2025 2131 by Scott  ALEX Chris  Head of Bed (HOB) Positioning: HOB at 20-30 degrees  Taken 6/27/2025 2000 by Dot Chavez RN  Head of Bed (HOB) Positioning: HOB at 20-30 degrees

## 2025-06-28 NOTE — PROGRESS NOTES
06/28/25 1330   Appointment Info   Signing Clinician's Name / Credentials (PT) Laurie Pablo, PT, DPT   Living Environment   People in Home child(joe), adult   Current Living Arrangements house   Home Accessibility stairs within home   Number of Stairs, Within Home, Primary greater than 10 stairs   Stair Railings, Within Home, Primary railings safe and in good condition   Transportation Anticipated car, drives self   Living Environment Comments Patient lives in a two-story house with his adult son.   Self-Care   Usual Activity Tolerance good   Current Activity Tolerance moderate   Equipment Currently Used at Home none   Fall history within last six months no   Activity/Exercise/Self-Care Comment Patient reports he is active and independent with all mobility/ADLs at baseline.   General Information   Onset of Illness/Injury or Date of Surgery 06/24/25   Referring Physician Jane Rhodes DO   Patient/Family Therapy Goals Statement (PT) none stated   Pertinent History of Current Problem (include personal factors and/or comorbidities that impact the POC) Patient is 61 YO M who presented to hospital with abdominal pain and weight loss. He was admitted on 6/24/25 with Presumed metastatic malignancy - hepatobiliary vs pancreatic source.   Existing Precautions/Restrictions no known precautions/restrictions   General Observations Patient in supine with son present in room. Agreeable to PT.   Cognition   Affect/Mental Status (Cognition) sad/depressed affect   Orientation Status (Cognition) oriented x 4   Follows Commands (Cognition) WNL   Behavioral Issues withdrawn   Pain Assessment   Patient Currently in Pain No   Integumentary/Edema   Integumentary/Edema Comments Mild non-pitting edema on tops of B feet, puffiness   Posture    Posture Not impaired   Range of Motion (ROM)   Range of Motion ROM is WNL   Strength (Manual Muscle Testing)   Strength Comments B hip flexion 3+/5, B knee extension 4/5   Bed Mobility    Bed Mobility no deficits identified   Comment, (Bed Mobility) Independent supine <> sit   Transfers   Comment, (Transfers) Independent sit <> stand from EOB   Gait/Stairs (Locomotion)   [place] Level (Gait) supervision   Distance in Feet (Gait) 20'   Pattern (Gait) step-through   Comment, (Gait/Stairs) Patient ambulated in room and hallway with slouched posture, slow lior and decreased B foot clearance   Balance   Balance Comments Good sitting balance; Steady ambulating without assistive device, mild unsteadiness descending stairs   Sensory Examination   Sensory Perception patient reports no sensory changes   Coordination   Coordination no deficits were identified   Clinical Impression   Criteria for Skilled Therapeutic Intervention Yes, treatment indicated   PT Diagnosis (PT) Impaired mobility   Influenced by the following impairments Generalized weakness, decreased activity tolerance, decreased balance   Functional limitations due to impairments Increased difficulty with ambulation and stairs   Clinical Presentation (PT Evaluation Complexity) stable   Clinical Presentation Rationale Medical status, clinical judgement   Clinical Decision Making (Complexity) low complexity   Planned Therapy Interventions (PT) gait training;home exercise program;patient/family education;stair training;strengthening;progressive activity/exercise;home program guidelines;balance training   Risk & Benefits of therapy have been explained evaluation/treatment results reviewed;care plan/treatment goals reviewed;risks/benefits reviewed;current/potential barriers reviewed;participants voiced agreement with care plan;participants included;patient;son   PT Total Evaluation Time   PT Eval, Low Complexity Minutes (85038) 8   Physical Therapy Goals   PT Frequency 3x/week   PT Predicted Duration/Target Date for Goal Attainment 07/04/25   PT Goals Gait;Stairs   PT: Gait Independent;Greater than 200 feet   PT: Stairs Independent;Greater  than 10 stairs;Rail on both sides   Interventions   Interventions Quick Adds Gait Training   Gait Training   Gait Training Minutes (61848) 9   Symptoms Noted During/After Treatment (Gait Training) fatigue   Treatment Detail/Skilled Intervention Patient ambulated 160' x 2 with no assistive device. Initially with slow lior and decreased foot clearance, both improved with increased activity. Patient ambulated up/down 12 stairs with 1 railing ascending, cues to use B rails descending due to mild unsteadiness, SBA throughout. Patient returned to room. Education on taking 3-4 walks per day during hosiptal stay to prevent further deconditioning. Patient in supine at end of session. Updated RN Lili.   Distance in Feet 160' x 2   PT Discharge Planning   PT Plan Activity tolerance progression, strengthening, practice stairs   PT Discharge Recommendation (DC Rec) home   PT Rationale for DC Rec Anticipate when medically ready patient will be able to return home with his adult son. He is presenting with some deconditioning from hospital stay but ambulating SBA with no assistive device. Do not anticipate any post acute PT Needs.   PT Brief overview of current status SBA with no AD for hallway ambulation and stairs   PT Total Distance Amb During Session (feet) 340   Physical Therapy Time and Intention   Timed Code Treatment Minutes 9   Total Session Time (sum of timed and untimed services) 17

## 2025-06-28 NOTE — PROGRESS NOTES
[facility]    Medicine Progress Note - Hospitalist Service    Date of Admission:  6/24/2025    Assessment & Plan   Zoltan Dalton is a 60 year old male with PMH of tobacco use disorder, who was admitted on 6/24/2025 with abdominal pain.    Presumed metastatic malignancy - hepatobiliary vs pancreatic source  Multiple liver lesions and intrahepatic biliary ductal dilatation s/p stenting 6/26  Early satiety  40lb weight loss  Epigastric and abdominal pain  Failure to thrive  Presents with months of above symptoms. Does not have a PCP or interact with the healthcare system often. Hx of ~6 drinks/week for years.   Afebrile and hemodynamically stable on arrival. Labs notable for tbili 33.4, alk phos 489, AST//140, Cr 2.24, WBC 14.5. Abs US showed moderate to severe intrahepatic biliary ductal dilatation in L hepatic lobe, common duct dilated to 12mm, and heterogenous liver with multiple indeterminate hypoechoic lesions. CT CAP with findings worrisome for liver malignancy- hepatic masses, severe L>R intrahepatic biliary dilatation; indeterminate focal region of wall thickening and decompression at mid ascending colon, considered colonic mass until proven otherwise; several indeterminate pulmonary nodules bilaterally, and small ascites.   Paracentesis unable to be completed 2/2 small volume ascites. AFP<1.8, CA 19-9: 31, CEA 43.1. Hepatitis panel negative.   MRCP showed massive intrahepatic ductal dilatation and findings that may represent intrahepatic obstructing cholangiocarcinoma vs pancreatic neoplasm with mets.   ERCP 6/26 showed biliary obstruction at the hilum 2/2 masses seen on EUS. 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 Underwent sphincterotomy and stenting.   EUS 6/26 with innumerable hepatic masses with obstruction of bile duct at hilum. FNA done.  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 Still awaiting final pathology results.   - GI consulted, now signed off  - Will need stent exchange in 2 months  - Follow up FNA results  - Oncology consult   - Outpatient PET scan and followup  - Start mirtazapine for appetite     Transaminitis   Likely related to above.   - Trend LFTs    PIYUSH  Possible CKD  Cr 2.24 from unclear baseline. Likely pre-renal given improvement with IVF. Cr remains elevated at 1.4. wonder if this is his baseline.   - Stop IVF, encourage PO intake  - Avoid nephrotoxins  - AM BMP    Colonic mass, ruled out  Previous colonoscopy '21 unremarkable, though had 4mm tubular adenoma in transverse colon on colonoscopy '15.   Colonoscopy on 6/26 showed a normal colon.     Bilateral pulmonary nodules  Possible metastatic disease vs infectious or inflammatory.   - Follow up CT chest in 3 months pending above workup    Tobacco use disorder - daily smoker, encourage cessation          Diet: Regular Diet Adult    DVT Prophylaxis: Pneumatic Compression Devices  Maki Catheter: Not present  Lines: None     Cardiac Monitoring: None  Code Status: Full Code      Clinically Significant Risk Factors        # Hypokalemia: Lowest K = 3.2 mmol/L in last 2 days, will replace as needed   # Hyperchloremia: Highest Cl = 110 mmol/L in last 2 days, will monitor as appropriate       # Hypercalcemia: Highest Ca = 10.7 mg/dL in last 2 days, will monitor as appropriate    # Hypoalbuminemia: Lowest albumin = 2.2 g/dL at 6/26/2025  7:09 AM, will monitor as appropriate                 # Moderate Malnutrition: based on nutrition assessment and treatment provided per dietitian's recommendations., PRESENT ON ADMISSION          Social Drivers of Health    Tobacco Use: Medium Risk (6/26/2025)    Patient History     Smoking Tobacco Use: Former     Smokeless Tobacco Use: Never   Alcohol Use: Unknown (3/4/2021)    AUDIT-C     Frequency of Alcohol Consumption: 2-4 times a month      Average Number of Drinks: Not asked     Frequency of Binge Drinking: Not asked          Disposition Plan     Medically Ready for Discharge: Anticipated in 2-4 Days             Jane Rhodes DO  Hospitalist Service  [facility]  Securely message with [employer] (more info)  Text page via [email or web address] Paging/Directory   ______________________________________________________________________    Interval History   No acute overnight events. No complaints. Still no appetite but trying to drink more. Will start mirtazapine and consult nutrition. Cr improved so will stop IVF and see if PO intake adequate. Waiting on biopsy results. If patient wants to go home prior to results, oncology set up follow up.     Physical Exam   Vital Signs: Temp: 98.4  F (36.9  C) Temp src: Oral BP: 129/59 Pulse: 72   Resp: 18 SpO2: 96 % O2 Device: None (Room air)    Weight: 124 lbs 4.8 oz    Constitutional: Awake, alert, no distress, and cooperative  Cardiovascular: Regular rate and rhythm, normal S1 and S2, no S3 or S4, and no murmur noted  Respiratory: No increased work of breathing, good air exchange, clear to auscultation bilaterally, no crackles or wheezing  Gastrointestinal: Abdomen soft, non-tender, non-distended. BS normal. No masses, organomegaly     Medical Decision Making       45 MINUTES SPENT BY ME on the date of service doing chart review, history, exam, documentation & further activities per the note.      Data     I have personally reviewed the following data over the past 24 hrs:    13.7 (H)  \   10.4 (L)   / 236     140 110 (H) 38.3 (H) /  96   3.7 24 1.36 (H) \     ALT: 116 (H) AST: 121 (H) AP: 370 (H) TBILI: 25.1 (HH)   ALB: 2.3 (L) TOT PROTEIN: 4.2 (L) LIPASE: N/A

## 2025-06-29 LAB
ALBUMIN SERPL BCG-MCNC: 2.2 G/DL (ref 3.5–5.2)
ALP SERPL-CCNC: 390 U/L (ref 40–150)
ALT SERPL W P-5'-P-CCNC: 216 U/L (ref 0–70)
ANION GAP SERPL CALCULATED.3IONS-SCNC: 9 MMOL/L (ref 7–15)
AST SERPL W P-5'-P-CCNC: 230 U/L (ref 0–45)
BILIRUB SERPL-MCNC: 26.3 MG/DL
BUN SERPL-MCNC: 37.7 MG/DL (ref 8–23)
CALCIUM SERPL-MCNC: 10.6 MG/DL (ref 8.8–10.4)
CHLORIDE SERPL-SCNC: 112 MMOL/L (ref 98–107)
CREAT SERPL-MCNC: 1.48 MG/DL (ref 0.67–1.17)
EGFRCR SERPLBLD CKD-EPI 2021: 53 ML/MIN/1.73M2
ERYTHROCYTE [DISTWIDTH] IN BLOOD BY AUTOMATED COUNT: 24.4 % (ref 10–15)
GLUCOSE SERPL-MCNC: 99 MG/DL (ref 70–99)
HCO3 SERPL-SCNC: 22 MMOL/L (ref 22–29)
HCT VFR BLD AUTO: 27.6 % (ref 40–53)
HGB BLD-MCNC: 10.2 G/DL (ref 13.3–17.7)
MAGNESIUM SERPL-MCNC: 2.3 MG/DL (ref 1.7–2.3)
MCH RBC QN AUTO: 34.3 PG (ref 26.5–33)
MCHC RBC AUTO-ENTMCNC: 37 G/DL (ref 31.5–36.5)
MCV RBC AUTO: 93 FL (ref 78–100)
PHOSPHATE SERPL-MCNC: 2.5 MG/DL (ref 2.5–4.5)
PLATELET # BLD AUTO: 231 10E3/UL (ref 150–450)
POTASSIUM SERPL-SCNC: 3.3 MMOL/L (ref 3.4–5.3)
POTASSIUM SERPL-SCNC: 3.7 MMOL/L (ref 3.4–5.3)
PROT SERPL-MCNC: 4.2 G/DL (ref 6.4–8.3)
RBC # BLD AUTO: 2.97 10E6/UL (ref 4.4–5.9)
SODIUM SERPL-SCNC: 143 MMOL/L (ref 135–145)
WBC # BLD AUTO: 15.9 10E3/UL (ref 4–11)

## 2025-06-29 PROCEDURE — 85027 COMPLETE CBC AUTOMATED: CPT | Performed by: STUDENT IN AN ORGANIZED HEALTH CARE EDUCATION/TRAINING PROGRAM

## 2025-06-29 PROCEDURE — 83735 ASSAY OF MAGNESIUM: CPT | Performed by: STUDENT IN AN ORGANIZED HEALTH CARE EDUCATION/TRAINING PROGRAM

## 2025-06-29 PROCEDURE — 250N000013 HC RX MED GY IP 250 OP 250 PS 637: Performed by: STUDENT IN AN ORGANIZED HEALTH CARE EDUCATION/TRAINING PROGRAM

## 2025-06-29 PROCEDURE — 36415 COLL VENOUS BLD VENIPUNCTURE: CPT | Performed by: STUDENT IN AN ORGANIZED HEALTH CARE EDUCATION/TRAINING PROGRAM

## 2025-06-29 PROCEDURE — 84132 ASSAY OF SERUM POTASSIUM: CPT | Performed by: STUDENT IN AN ORGANIZED HEALTH CARE EDUCATION/TRAINING PROGRAM

## 2025-06-29 PROCEDURE — 120N000001 HC R&B MED SURG/OB

## 2025-06-29 PROCEDURE — 250N000013 HC RX MED GY IP 250 OP 250 PS 637: Performed by: NURSE PRACTITIONER

## 2025-06-29 PROCEDURE — 99232 SBSQ HOSP IP/OBS MODERATE 35: CPT | Performed by: STUDENT IN AN ORGANIZED HEALTH CARE EDUCATION/TRAINING PROGRAM

## 2025-06-29 PROCEDURE — 80053 COMPREHEN METABOLIC PANEL: CPT | Performed by: STUDENT IN AN ORGANIZED HEALTH CARE EDUCATION/TRAINING PROGRAM

## 2025-06-29 PROCEDURE — 84100 ASSAY OF PHOSPHORUS: CPT | Performed by: STUDENT IN AN ORGANIZED HEALTH CARE EDUCATION/TRAINING PROGRAM

## 2025-06-29 RX ORDER — POTASSIUM CHLORIDE 1500 MG/1
40 TABLET, EXTENDED RELEASE ORAL ONCE
Status: COMPLETED | OUTPATIENT
Start: 2025-06-29 | End: 2025-06-29

## 2025-06-29 RX ADMIN — SENNOSIDES AND DOCUSATE SODIUM 1 TABLET: 50; 8.6 TABLET ORAL at 20:59

## 2025-06-29 RX ADMIN — MIRTAZAPINE 15 MG: 15 TABLET, FILM COATED ORAL at 21:00

## 2025-06-29 RX ADMIN — SENNOSIDES AND DOCUSATE SODIUM 2 TABLET: 50; 8.6 TABLET ORAL at 11:19

## 2025-06-29 RX ADMIN — POTASSIUM CHLORIDE 40 MEQ: 1500 TABLET, EXTENDED RELEASE ORAL at 13:16

## 2025-06-29 ASSESSMENT — ACTIVITIES OF DAILY LIVING (ADL)
ADLS_ACUITY_SCORE: 45
ADLS_ACUITY_SCORE: 49
ADLS_ACUITY_SCORE: 45
ADLS_ACUITY_SCORE: 49
ADLS_ACUITY_SCORE: 45
ADLS_ACUITY_SCORE: 48
ADLS_ACUITY_SCORE: 49
ADLS_ACUITY_SCORE: 45
ADLS_ACUITY_SCORE: 49
ADLS_ACUITY_SCORE: 45
ADLS_ACUITY_SCORE: 48
ADLS_ACUITY_SCORE: 45
ADLS_ACUITY_SCORE: 49

## 2025-06-29 NOTE — PROGRESS NOTES
[facility]    Medicine Progress Note - Hospitalist Service    Date of Admission:  6/24/2025    Assessment & Plan   Zoltan Dalton is a 60 year old male with PMH of tobacco use disorder, who was admitted on 6/24/2025 with abdominal pain.    Presumed metastatic malignancy - hepatobiliary vs pancreatic source  Multiple liver lesions and intrahepatic biliary ductal dilatation s/p stenting 6/26  Early satiety  40lb weight loss  Epigastric and abdominal pain  Failure to thrive  Presents with months of above symptoms. Does not have a PCP or interact with the healthcare system often. Hx of ~6 drinks/week for years.   Afebrile and hemodynamically stable on arrival. Labs notable for tbili 33.4, alk phos 489, AST//140, Cr 2.24, WBC 14.5. Abs US showed moderate to severe intrahepatic biliary ductal dilatation in L hepatic lobe, common duct dilated to 12mm, and heterogenous liver with multiple indeterminate hypoechoic lesions. CT CAP with findings worrisome for liver malignancy- hepatic masses, severe L>R intrahepatic biliary dilatation; indeterminate focal region of wall thickening and decompression at mid ascending colon, considered colonic mass until proven otherwise; several indeterminate pulmonary nodules bilaterally, and small ascites.   Paracentesis unable to be completed 2/2 small volume ascites. AFP<1.8, CA 19-9: 31, CEA 43.1. Hepatitis panel negative.   MRCP showed massive intrahepatic ductal dilatation and findings that may represent intrahepatic obstructing cholangiocarcinoma vs pancreatic neoplasm with mets.   ERCP 6/26 showed biliary obstruction at the hilum 2/2 masses seen on EUS. 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 Underwent sphincterotomy and stenting.   EUS 6/26 with innumerable hepatic masses with obstruction of bile duct at hilum. FNA done.  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 Still awaiting final pathology results.   - GI consulted, now signed off  - Will need stent exchange in 2 months  - Follow up FNA results  - Oncology consult   - Outpatient PET scan and followup  - Start mirtazapine for appetite     Transaminitis   Likely related to above. GI stated it will take time for LFTs to improve after stenting.   - Trend LFTs    PIYUSH  Possible CKD  Cr 2.24 from unclear baseline. Likely pre-renal given improvement with IVF. Cr remains elevated at 1.4. wonder if this is his baseline.   - Stop IVF, encourage PO intake  - Avoid nephrotoxins  - AM BMP    Colonic mass, ruled out  Previous colonoscopy '21 unremarkable, though had 4mm tubular adenoma in transverse colon on colonoscopy '15.   Colonoscopy on 6/26 showed a normal colon.     Bilateral pulmonary nodules  Possible metastatic disease vs infectious or inflammatory.   - Follow up CT chest in 3 months pending above workup    Tobacco use disorder - daily smoker, encourage cessation          Diet: Regular Diet Adult  Snacks/Supplements Adult: Ensure Plus High Protein; With Meals    DVT Prophylaxis: Pneumatic Compression Devices  Maki Catheter: Not present  Lines: None     Cardiac Monitoring: None  Code Status: Full Code      Clinically Significant Risk Factors        # Hypokalemia: Lowest K = 3.3 mmol/L in last 2 days, will replace as needed   # Hyperchloremia: Highest Cl = 110 mmol/L in last 2 days, will monitor as appropriate       # Hypercalcemia: corrected calcium is >10.1, will monitor as appropriate    # Hypoalbuminemia: Lowest albumin = 2.2 g/dL at 6/26/2025  7:09 AM, will monitor as appropriate                 # Severe Malnutrition: based on nutrition assessment and treatment provided per dietitian's recommendations.           Social Drivers of Health    Tobacco Use: Medium Risk (6/26/2025)    Patient History     Smoking Tobacco Use: Former     Smokeless Tobacco Use: Never   Alcohol Use:  Unknown (3/4/2021)    AUDIT-C     Frequency of Alcohol Consumption: 2-4 times a month     Average Number of Drinks: Not asked     Frequency of Binge Drinking: Not asked          Disposition Plan     Medically Ready for Discharge: Anticipated in 2-4 Days             Jane Rhodes DO  Hospitalist Service  [facility]  Securely message with [employer] (more info)  Text page via [employer] Paging/Directory   ______________________________________________________________________    Interval History   No acute overnight events.  Waiting on AM labs. Patient feeling fine, no complaints. Thinks his appetite is improving.     Physical Exam   Vital Signs: Temp: 98.4  F (36.9  C) Temp src: Oral BP: 111/46 Pulse: 79   Resp: 18 SpO2: 97 % O2 Device: None (Room air)    Weight: 124 lbs 4.8 oz    Constitutional: Awake, alert, no distress, and cooperative  Cardiovascular: Regular rate and rhythm, normal S1 and S2, no S3 or S4, and no murmur noted  Respiratory: No increased work of breathing, good air exchange, clear to auscultation bilaterally, no crackles or wheezing  Gastrointestinal: Abdomen soft, non-tender, non-distended. BS normal. No masses, organomegaly     Medical Decision Making       45 MINUTES SPENT BY ME on the date of service doing chart review, history, exam, documentation & further activities per the note.      Data     I have personally reviewed the following data over the past 24 hrs:    15.9 (H)  \   10.2 (L)   / 231     143 112 (H) 37.7 (H) /  99   3.3 (L) 22 1.48 (H) \     ALT: 216 (H) AST: 230 (H) AP: 390 (H) TBILI: 26.3 (HH)   ALB: 2.2 (L) TOT PROTEIN: 4.2 (L) LIPASE: N/A

## 2025-06-29 NOTE — PLAN OF CARE
"/67 (BP Location: Right arm, Patient Position: Semi-Duong's, Cuff Size: Adult Regular)   Pulse 68   Temp 99.3  F (37.4  C) (Oral)   Resp 20   Ht 1.727 m (5' 8\")   Wt 56.4 kg (124 lb 4.8 oz)   SpO2 93%   BMI 18.90 kg/m      A&O. SBA. Lethargic and withdrawn. Hem/onc following, awaiting liver biopsy. Son at bedside, updated on plan of care.           Goal Outcome Evaluation:      Plan of Care Reviewed With: patient, child    Overall Patient Progress: no changeOverall Patient Progress: no change    Outcome Evaluation: Lethargic all shift. Replacing electrolytes. Hem/Onc following, awaiting liver biopsy results.      Problem: Adult Inpatient Plan of Care  Goal: Plan of Care Review  Description: The Plan of Care Review/Shift note should be completed every shift.  The Outcome Evaluation is a brief statement about your assessment that the patient is improving, declining, or no change.  This information will be displayed automatically on your shift  note.  Outcome: Not Progressing  Flowsheets (Taken 6/29/2025 1356)  Outcome Evaluation: Lethargic all shift. Replacing electrolytes. Hem/Onc following, awaiting liver biopsy results.  Plan of Care Reviewed With:   patient   child  Overall Patient Progress: no change  Goal: Patient-Specific Goal (Individualized)  Description: You can add care plan individualizations to a care plan. Examples of Individualization might be:  \"Parent requests to be called daily at 9am for status\", \"I have a hard time hearing out of my right ear\", or \"Do not touch me to wake me up as it startles  me\".  Outcome: Not Progressing  Goal: Absence of Hospital-Acquired Illness or Injury  Outcome: Not Progressing  Intervention: Prevent Skin Injury  Recent Flowsheet Documentation  Taken 6/29/2025 1116 by Dionne Dunham RN  Body Position: position changed independently  Goal: Optimal Comfort and Wellbeing  Outcome: Not Progressing  Goal: Readiness for Transition of Care  Outcome: Not " Progressing     Problem: Fall Injury Risk  Goal: Absence of Fall and Fall-Related Injury  Outcome: Not Progressing     Problem: Acute Kidney Injury/Impairment  Goal: Fluid and Electrolyte Balance  Outcome: Not Progressing  Goal: Improved Oral Intake  Outcome: Not Progressing  Goal: Effective Renal Function  Outcome: Not Progressing     Problem: Liver Failure  Goal: Optimal Coping with Liver Failure  Outcome: Not Progressing  Goal: Absence of Bleeding  Outcome: Not Progressing  Goal: Fluid and Electrolyte Balance  Outcome: Not Progressing  Goal: Optimal Gastrointestinal Function  Outcome: Not Progressing  Goal: Blood Glucose Level Within Target Range  Outcome: Not Progressing  Goal: Hemodynamic Stability  Outcome: Not Progressing  Goal: Absence of Infection Signs and Symptoms  Outcome: Not Progressing  Goal: Optimal Neurologic Function  Outcome: Not Progressing  Intervention: Monitor and Optimize Neurologic Status  Recent Flowsheet Documentation  Taken 6/29/2025 1116 by Dionne Dunham RN  Head of Bed (HOB) Positioning: HOB at 30-45 degrees  Goal: Improved Oral Intake  Outcome: Not Progressing  Goal: Optimal Pain Control, Comfort and Function  Outcome: Not Progressing  Goal: Effective Oxygenation and Ventilation  Outcome: Not Progressing  Intervention: Promote Airway Secretion Clearance  Recent Flowsheet Documentation  Taken 6/29/2025 1116 by Dionne Dunham, RN  Activity Management: ambulated to bathroom  Intervention: Optimize Oxygenation and Ventilation  Recent Flowsheet Documentation  Taken 6/29/2025 1116 by Dionne Dunham, RN  Head of Bed (HOB) Positioning: HOB at 30-45 degrees

## 2025-06-29 NOTE — PLAN OF CARE
"Shift 9740-1577  VSS.  A&Ox4, lethargic.  On RA.  Pt denies pain.  On remeron for appetite per DO note.  Reg diet, low appetite.  K, Mg, and Phos, AM rechecks.  ALT and AST elevation.  R PIV, S/L.  Son at bedside.  SBA.  PT and nutrition consulted.  PT recommends home.  Plan for discharge TBD.        Goal Outcome Evaluation:      Plan of Care Reviewed With: patient    Overall Patient Progress: no changeOverall Patient Progress: no change    Outcome Evaluation: Lethargic. Pt denies pain. Reg diet, low appetite. K, Mg, and Phos, AM rechecks. ALT and AST elevation. PT and nutrition consulted. Plan for discharge TBD.        Problem: Adult Inpatient Plan of Care  Goal: Plan of Care Review  Description: The Plan of Care Review/Shift note should be completed every shift.  The Outcome Evaluation is a brief statement about your assessment that the patient is improving, declining, or no change.  This information will be displayed automatically on your shift  note.  Outcome: Progressing  Flowsheets (Taken 6/28/2025 2013)  Outcome Evaluation: Lethargic. Pt denies pain. Reg diet, low appetite. K, Mg, and Phos, AM rechecks. ALT and AST elevation. PT and nutrition consulted. Plan for discharge TBD.  Plan of Care Reviewed With: patient  Overall Patient Progress: no change  Goal: Patient-Specific Goal (Individualized)  Description: You can add care plan individualizations to a care plan. Examples of Individualization might be:  \"Parent requests to be called daily at 9am for status\", \"I have a hard time hearing out of my right ear\", or \"Do not touch me to wake me up as it startles  me\".  Outcome: Progressing  Goal: Absence of Hospital-Acquired Illness or Injury  Outcome: Progressing  Intervention: Identify and Manage Fall Risk  Recent Flowsheet Documentation  Taken 6/28/2025 1733 by Catina Chun RN  Safety Promotion/Fall Prevention:   activity supervised   assistive device/personal items within reach   clutter free environment " maintained   safety round/check completed  Taken 6/28/2025 1520 by Catina Chun, RN  Safety Promotion/Fall Prevention: safety round/check completed  Intervention: Prevent Skin Injury  Recent Flowsheet Documentation  Taken 6/28/2025 1735 by Catina Chun RN  Body Position: position changed independently  Intervention: Prevent Infection  Recent Flowsheet Documentation  Taken 6/28/2025 1735 by Catina Chun, RN  Infection Prevention:   cohorting utilized   environmental surveillance performed   equipment surfaces disinfected   hand hygiene promoted   personal protective equipment utilized   rest/sleep promoted   single patient room provided  Goal: Optimal Comfort and Wellbeing  Outcome: Progressing  Goal: Readiness for Transition of Care  Outcome: Progressing

## 2025-06-29 NOTE — PROGRESS NOTES
Medical oncology chart check (patient not seen):    Impression:    Liver/lung lesions (cholangiocarcinoma?), biopsy result pending.  Obstructive jaundice, s/p biliary stent  Failure to thrive  NCNC anemia  PIYUSH    Plan:  Outpatient follow-up with Dr. Roberto, for completion of staging and management recommendations

## 2025-06-29 NOTE — PLAN OF CARE
"VSS, afebrile and RA sats mid 90's.   Pt denies pain.  Pt up to BR with SBA.  Continues to be lethargic.  Slept most of noc.  Plan is for out patient PET scan and follow up with oncology.   Discharge date unknown at this time.  Problem: Adult Inpatient Plan of Care  Goal: Plan of Care Review  Description: The Plan of Care Review/Shift note should be completed every shift.  The Outcome Evaluation is a brief statement about your assessment that the patient is improving, declining, or no change.  This information will be displayed automatically on your shift  note.  Outcome: Not Progressing  Flowsheets (Taken 6/29/2025 0818)  Plan of Care Reviewed With:   patient   child  Goal: Patient-Specific Goal (Individualized)  Description: You can add care plan individualizations to a care plan. Examples of Individualization might be:  \"Parent requests to be called daily at 9am for status\", \"I have a hard time hearing out of my right ear\", or \"Do not touch me to wake me up as it startles  me\".  Outcome: Not Progressing  Goal: Absence of Hospital-Acquired Illness or Injury  Outcome: Not Progressing  Intervention: Prevent Skin Injury  Recent Flowsheet Documentation  Taken 6/29/2025 0300 by Mattie Mcdowell, RN  Body Position: position changed independently  Goal: Optimal Comfort and Wellbeing  Outcome: Not Progressing  Goal: Readiness for Transition of Care  Outcome: Not Progressing   Goal Outcome Evaluation:      Plan of Care Reviewed With: patient, child                     "

## 2025-06-30 LAB
ALBUMIN SERPL BCG-MCNC: 2.2 G/DL (ref 3.5–5.2)
ALP SERPL-CCNC: 398 U/L (ref 40–150)
ALT SERPL W P-5'-P-CCNC: 207 U/L (ref 0–70)
ANION GAP SERPL CALCULATED.3IONS-SCNC: 10 MMOL/L (ref 7–15)
AST SERPL W P-5'-P-CCNC: 173 U/L (ref 0–45)
BILIRUB SERPL-MCNC: 26.7 MG/DL
BUN SERPL-MCNC: 41.1 MG/DL (ref 8–23)
CALCIUM SERPL-MCNC: 10.9 MG/DL (ref 8.8–10.4)
CHLORIDE SERPL-SCNC: 114 MMOL/L (ref 98–107)
CREAT SERPL-MCNC: 1.63 MG/DL (ref 0.67–1.17)
EGFRCR SERPLBLD CKD-EPI 2021: 48 ML/MIN/1.73M2
ERYTHROCYTE [DISTWIDTH] IN BLOOD BY AUTOMATED COUNT: 25.1 % (ref 10–15)
GLUCOSE SERPL-MCNC: 109 MG/DL (ref 70–99)
HCO3 SERPL-SCNC: 22 MMOL/L (ref 22–29)
HCT VFR BLD AUTO: 29.1 % (ref 40–53)
HGB BLD-MCNC: 10.7 G/DL (ref 13.3–17.7)
MAGNESIUM SERPL-MCNC: 2.4 MG/DL (ref 1.7–2.3)
MCH RBC QN AUTO: 34.6 PG (ref 26.5–33)
MCHC RBC AUTO-ENTMCNC: 36.8 G/DL (ref 31.5–36.5)
MCV RBC AUTO: 94 FL (ref 78–100)
PATH REPORT.COMMENTS IMP SPEC: ABNORMAL
PATH REPORT.COMMENTS IMP SPEC: YES
PATH REPORT.FINAL DX SPEC: ABNORMAL
PATH REPORT.GROSS SPEC: ABNORMAL
PATH REPORT.MICROSCOPIC SPEC OTHER STN: ABNORMAL
PATH REPORT.RELEVANT HX SPEC: ABNORMAL
PHOSPHATE SERPL-MCNC: 3 MG/DL (ref 2.5–4.5)
PLATELET # BLD AUTO: 241 10E3/UL (ref 150–450)
POTASSIUM SERPL-SCNC: 3.7 MMOL/L (ref 3.4–5.3)
PROT SERPL-MCNC: 4.4 G/DL (ref 6.4–8.3)
RBC # BLD AUTO: 3.09 10E6/UL (ref 4.4–5.9)
SODIUM SERPL-SCNC: 146 MMOL/L (ref 135–145)
WBC # BLD AUTO: 17.7 10E3/UL (ref 4–11)

## 2025-06-30 PROCEDURE — 83735 ASSAY OF MAGNESIUM: CPT | Performed by: STUDENT IN AN ORGANIZED HEALTH CARE EDUCATION/TRAINING PROGRAM

## 2025-06-30 PROCEDURE — 85027 COMPLETE CBC AUTOMATED: CPT | Performed by: STUDENT IN AN ORGANIZED HEALTH CARE EDUCATION/TRAINING PROGRAM

## 2025-06-30 PROCEDURE — 84100 ASSAY OF PHOSPHORUS: CPT | Performed by: STUDENT IN AN ORGANIZED HEALTH CARE EDUCATION/TRAINING PROGRAM

## 2025-06-30 PROCEDURE — 250N000013 HC RX MED GY IP 250 OP 250 PS 637: Performed by: STUDENT IN AN ORGANIZED HEALTH CARE EDUCATION/TRAINING PROGRAM

## 2025-06-30 PROCEDURE — 99232 SBSQ HOSP IP/OBS MODERATE 35: CPT | Performed by: STUDENT IN AN ORGANIZED HEALTH CARE EDUCATION/TRAINING PROGRAM

## 2025-06-30 PROCEDURE — 80053 COMPREHEN METABOLIC PANEL: CPT | Performed by: STUDENT IN AN ORGANIZED HEALTH CARE EDUCATION/TRAINING PROGRAM

## 2025-06-30 PROCEDURE — 250N000013 HC RX MED GY IP 250 OP 250 PS 637: Performed by: NURSE PRACTITIONER

## 2025-06-30 PROCEDURE — 120N000001 HC R&B MED SURG/OB

## 2025-06-30 PROCEDURE — 36415 COLL VENOUS BLD VENIPUNCTURE: CPT | Performed by: STUDENT IN AN ORGANIZED HEALTH CARE EDUCATION/TRAINING PROGRAM

## 2025-06-30 PROCEDURE — 258N000003 HC RX IP 258 OP 636: Performed by: STUDENT IN AN ORGANIZED HEALTH CARE EDUCATION/TRAINING PROGRAM

## 2025-06-30 RX ORDER — SODIUM CHLORIDE, SODIUM LACTATE, POTASSIUM CHLORIDE, CALCIUM CHLORIDE 600; 310; 30; 20 MG/100ML; MG/100ML; MG/100ML; MG/100ML
INJECTION, SOLUTION INTRAVENOUS CONTINUOUS
Status: DISCONTINUED | OUTPATIENT
Start: 2025-06-30 | End: 2025-06-30

## 2025-06-30 RX ADMIN — SENNOSIDES AND DOCUSATE SODIUM 1 TABLET: 50; 8.6 TABLET ORAL at 09:52

## 2025-06-30 RX ADMIN — MIRTAZAPINE 15 MG: 15 TABLET, FILM COATED ORAL at 21:24

## 2025-06-30 RX ADMIN — SODIUM CHLORIDE, SODIUM LACTATE, POTASSIUM CHLORIDE, AND CALCIUM CHLORIDE: .6; .31; .03; .02 INJECTION, SOLUTION INTRAVENOUS at 09:55

## 2025-06-30 ASSESSMENT — ACTIVITIES OF DAILY LIVING (ADL)
ADLS_ACUITY_SCORE: 48
ADLS_ACUITY_SCORE: 49
ADLS_ACUITY_SCORE: 48
ADLS_ACUITY_SCORE: 49
ADLS_ACUITY_SCORE: 48
ADLS_ACUITY_SCORE: 49
ADLS_ACUITY_SCORE: 48
ADLS_ACUITY_SCORE: 48
ADLS_ACUITY_SCORE: 49
ADLS_ACUITY_SCORE: 48

## 2025-06-30 NOTE — PROGRESS NOTES
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    - Liver function tests have not improved at all since stent placement.     PLAN:  - Need to re-consult GI given lack of improvement of liver function tests.  I understand it will take time for bilirubin to improve significantly, but there has been no improvement at all since stent placement 4 days ago.  - Await biopsy result  - Will order outpatient PET scan  - Discussed that if cholangiocarcinoma diagnosis is confirmed, the disease would not be considered curable but treatable, and the goal of treatment is to help him live longer with better quality of life.  - Standard first line treatment is gemcitabine/cisplatin/pembrolizumab, but would need to wait for his liver function tests to get better first  - I will set up an outpatient visit in about a week to go over his PET scan result and to further discuss systemic therapy.     Paul Roberto M.D.  [facility]  [phone]    Subjective:    Patient was asleep when I visited.  According his son,  he felt a little better this weekend.  Was able to eat a little.  Still able to get around on his own.  Liver function tests have not improved at all.      Labs:  All labs reviewed    CBC  Recent Labs   Lab 06/30/25  0712 06/29/25  1054 06/28/25  0712   WBC 17.7* 15.9* 13.7*   HGB 10.7* 10.2* 10.4*   MCV 94 93 93    231 236       CMP  Recent Labs   Lab 06/30/25  0712 06/29/25  1748 06/29/25  1054 06/28/25  1555 06/28/25  0712 06/27/25  1454 06/27/25  0647   *  --  143  --  140  --  141   POTASSIUM 3.7 3.7 3.3*  --  3.7   < > 3.2*   CHLORIDE 114*  --  112*  --  110*  --  107   CO2 22  --  22  --  24  --  24   ANIONGAP 10  --  9  --  6*  --  10   *  --  99   "--  96  --  109*   BUN 41.1*  --  37.7*  --  38.3*  --  38.0*   CR 1.63*  --  1.48*  --  1.36*  --  1.47*   GFRESTIMATED 48*  --  53*  --  59*  --  54*   SANDRA 10.9*  --  10.6*  --  10.5*  --  10.7*   MAG 2.4*  --  2.3  --  2.2  --  2.2   PHOS 3.0  --  2.5 2.6 1.6*  --  2.6   PROTTOTAL 4.4*  --  4.2*  --  4.2*  --  4.3*   ALBUMIN 2.2*  --  2.2*  --  2.3*  --  2.5*   BILITOTAL 26.7*  --  26.3*  --  25.1*  --  26.4*   ALKPHOS 398*  --  390*  --  370*  --  352*   *  --  230*  --  121*  --  101*   *  --  216*  --  116*  --  104*    < > = values in this interval not displayed.       INR  Recent Labs   Lab 06/26/25  0709 06/24/25  0642   INR 1.14 1.09       Blood CultureNo results for input(s): \"CULT\" in the last 168 hours.      Paul Roberto MD  [facility]  6/30/2025 12:59 PM        "

## 2025-06-30 NOTE — PROGRESS NOTES
[facility]    Medicine Progress Note - Hospitalist Service    Date of Admission:  6/24/2025    Assessment & Plan   Zoltan Dalton is a 60 year old male with PMH of tobacco use disorder, who was admitted on 6/24/2025 with abdominal pain.    Presumed metastatic malignancy - hepatobiliary vs pancreatic source  Multiple liver lesions and intrahepatic biliary ductal dilatation s/p stenting 6/26  Early satiety  40lb weight loss  Epigastric and abdominal pain  Failure to thrive  Presents with months of above symptoms. Does not have a PCP or interact with the healthcare system often. Hx of ~6 drinks/week for years.   Afebrile and hemodynamically stable on arrival. Labs notable for tbili 33.4, alk phos 489, AST//140, Cr 2.24, WBC 14.5. Abs US showed moderate to severe intrahepatic biliary ductal dilatation in L hepatic lobe, common duct dilated to 12mm, and heterogenous liver with multiple indeterminate hypoechoic lesions. CT CAP with findings worrisome for liver malignancy- hepatic masses, severe L>R intrahepatic biliary dilatation; indeterminate focal region of wall thickening and decompression at mid ascending colon, considered colonic mass until proven otherwise; several indeterminate pulmonary nodules bilaterally, and small ascites.   Paracentesis unable to be completed 2/2 small volume ascites. AFP<1.8, CA 19-9: 31, CEA 43.1. Hepatitis panel negative.   MRCP showed massive intrahepatic ductal dilatation and findings that may represent intrahepatic obstructing cholangiocarcinoma vs pancreatic neoplasm with mets.   ERCP 6/26 showed biliary obstruction at the hilum 2/2 masses seen on EUS. 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 Underwent sphincterotomy and stenting.   EUS 6/26 with innumerable hepatic masses with obstruction of bile duct at hilum. FNA done.  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 Still awaiting final pathology results.   - Reconsult GI for persistently elevated LFTs  - Will need stent exchange in 2 months  - Follow up FNA results  - Oncology consult   - Outpatient PET scan and followup  - Start mirtazapine for appetite     Transaminitis   Likely related to above. GI stated it will take time for LFTs to improve after stenting, but it has not improved/mildly worsened.  - Reconsult GI   - Trend LFTs    Leukocytosis   Rising WBC but no new infectious symptoms. Also with worsening PIYUSH and LFTs. CT CAP done and showed moderate bilateral pleural effusions and atelectasis, interlobular septal thickening and groundglass attenuation in lower lobes, suggestive of edema, atelectasis, and/or pneumonitis.   Suspect leukocytosis is related to malignancy and not acute infection.  - Trend WBC    Pleural effusions  Possible pulmonary edema  Noted on CT 6/30. No respiratory symptoms, no hypoxia. Has been receiving IVF for PIYUSH. Will stop IVF and monitor.   - Consider lasix     PIYUSH  Possible CKD  Cr 2.24 from unclear baseline. Likely pre-renal given improvement with IVF. Cr remains elevated at 1.4. wonder if this is his baseline.   - Received IVF this morning, but will stop given CT findings, encourage PO intake  - Avoid nephrotoxins  - AM BMP    Colonic mass, ruled out  Previous colonoscopy '21 unremarkable, though had 4mm tubular adenoma in transverse colon on colonoscopy '15.   Colonoscopy on 6/26 showed a normal colon.     Bilateral pulmonary nodules  Possible metastatic disease vs infectious or inflammatory.   - Follow up CT chest in 3 months pending above workup    Tobacco use disorder - daily smoker, encourage cessation          Diet: Regular Diet Adult  Snacks/Supplements Adult: Ensure Plus High Protein; With Meals    DVT Prophylaxis: Pneumatic Compression Devices  Maki Catheter: Not present  Lines: None     Cardiac Monitoring: None  Code Status: Full Code       Clinically Significant Risk Factors        # Hypokalemia: Lowest K = 3.3 mmol/L in last 2 days, will replace as needed   # Hyperchloremia: Highest Cl = 112 mmol/L in last 2 days, will monitor as appropriate       # Hypercalcemia: Highest Ca = 10.6 mg/dL in last 2 days, will monitor as appropriate    # Hypoalbuminemia: Lowest albumin = 2.2 g/dL at 6/29/2025 10:54 AM, will monitor as appropriate                 # Severe Malnutrition: based on nutrition assessment and treatment provided per dietitian's recommendations.           Social Drivers of Health    Tobacco Use: Medium Risk (6/26/2025)    Patient History     Smoking Tobacco Use: Former     Smokeless Tobacco Use: Never   Alcohol Use: Unknown (3/4/2021)    AUDIT-C     Frequency of Alcohol Consumption: 2-4 times a month     Average Number of Drinks: Not asked     Frequency of Binge Drinking: Not asked          Disposition Plan     Medically Ready for Discharge: Anticipated in 2-4 Days             Jane Rhodes DO  Hospitalist Service  [facility]  Securely message with [employer] (more info)  Text page via [employer] Paging/Directory   ______________________________________________________________________    Interval History   No acute overnight events. No new symptoms. Denies fever/chills, dyspnea, cough, chest pain. Will repeat imaging because labs show leukocytosis, PIYUSH, and slightly worsening LFTs.     Physical Exam   Vital Signs: Temp: 98.6  F (37  C) Temp src: Oral BP: 123/56 Pulse: 91   Resp: 18 SpO2: 94 % O2 Device: None (Room air)    Weight: 124 lbs 4.8 oz    Constitutional: Awake, alert, no distress, and cooperative  Cardiovascular: Regular rate and rhythm, normal S1 and S2, no S3 or S4, and no murmur noted  Respiratory: No increased work of breathing, good air exchange, clear to auscultation bilaterally, no crackles or wheezing  Gastrointestinal: Abdomen soft, non-tender, non-distended. BS normal. No masses, organomegaly      Medical Decision Making       45 MINUTES SPENT BY ME on the date of service doing chart review, history, exam, documentation & further activities per the note.      Data     I have personally reviewed the following data over the past 24 hrs:    17.7 (H)  \   10.7 (L)   / 241     146 (H) 114 (H) 41.1 (H) /  109 (H)   3.7 22 1.63 (H) \     ALT: 207 (H) AST: 173 (H) AP: 398 (H) TBILI: 26.7 (HH)   ALB: 2.2 (L) TOT PROTEIN: 4.4 (L) LIPASE: N/A

## 2025-06-30 NOTE — PLAN OF CARE
"Shift 8467-5658  VSS.  A&Ox4, lethargic.  On RA.  Pt denies pain.  On remeron for appetite per DO note.  Reg diet, low appetite.  K, Mg, and Phos, AM rechecks.  ALT and AST increased.  R PIV, S/L.  Son at bedside.  SBA.  Hem/onc following.  Liver biopsy pending.      Goal Outcome Evaluation:      Plan of Care Reviewed With: patient    Overall Patient Progress: no changeOverall Patient Progress: no change    Outcome Evaluation: A&Ox4, lethargic. On remeron for appetite per DO note.  ALT and AST increased. Hem/onc following. Liver biopsy pending.        Problem: Adult Inpatient Plan of Care  Goal: Plan of Care Review  Description: The Plan of Care Review/Shift note should be completed every shift.  The Outcome Evaluation is a brief statement about your assessment that the patient is improving, declining, or no change.  This information will be displayed automatically on your shift  note.  Outcome: Progressing  Flowsheets (Taken 6/29/2025 2223)  Outcome Evaluation: A&Ox4, lethargic. On remeron for appetite per DO note.  ALT and AST increased. Hem/onc following. Liver biopsy pending.  Plan of Care Reviewed With: patient  Overall Patient Progress: no change  Goal: Patient-Specific Goal (Individualized)  Description: You can add care plan individualizations to a care plan. Examples of Individualization might be:  \"Parent requests to be called daily at 9am for status\", \"I have a hard time hearing out of my right ear\", or \"Do not touch me to wake me up as it startles  me\".  Outcome: Progressing  Goal: Absence of Hospital-Acquired Illness or Injury  Outcome: Progressing  Intervention: Identify and Manage Fall Risk  Recent Flowsheet Documentation  Taken 6/29/2025 2105 by Catina Chun, RN  Safety Promotion/Fall Prevention: safety round/check completed  Taken 6/29/2025 2050 by Catina Chun, RN  Safety Promotion/Fall Prevention: safety round/check completed  Taken 6/29/2025 1610 by Catina Chun, RN  Safety Promotion/Fall " Prevention:   activity supervised   assistive device/personal items within reach   clutter free environment maintained   safety round/check completed  Taken 6/29/2025 1520 by Catina Chun RN  Safety Promotion/Fall Prevention: safety round/check completed  Intervention: Prevent Skin Injury  Recent Flowsheet Documentation  Taken 6/29/2025 1610 by Catina Chun RN  Body Position: position changed independently  Intervention: Prevent Infection  Recent Flowsheet Documentation  Taken 6/29/2025 1610 by Catina Chun RN  Infection Prevention:   cohorting utilized   environmental surveillance performed   equipment surfaces disinfected   hand hygiene promoted   personal protective equipment utilized   rest/sleep promoted   single patient room provided  Goal: Optimal Comfort and Wellbeing  Outcome: Progressing  Goal: Readiness for Transition of Care  Outcome: Progressing

## 2025-06-30 NOTE — PLAN OF CARE
"16:09  RN shift summary 7-3: Pt oriented, but is sleepy.  Son at bedside.  Ambulates with SBA.  Lungs clear, diminished.  94% RA. No tele. Repeat CT done this morning. Had been NPO today for possible repeat ERCP. GI provider visited pt this afternoon and said the ERCP will not happen today.  Voiding without difficulty.  PIV saline locked.  Oncology, GI and PT following.        Goal Outcome Evaluation:      Plan of Care Reviewed With: patient    Overall Patient Progress: no changeOverall Patient Progress: no change    Outcome Evaluation: Lethargic.  Son at bedside.  Repeat CT done this morning. Possible repeat ercp this afternoon.  GI, oncology, PT following      Problem: Adult Inpatient Plan of Care  Goal: Plan of Care Review  Description: The Plan of Care Review/Shift note should be completed every shift.  The Outcome Evaluation is a brief statement about your assessment that the patient is improving, declining, or no change.  This information will be displayed automatically on your shift  note.  Outcome: Not Progressing  Flowsheets (Taken 6/30/2025 9681)  Outcome Evaluation: Lethargic.  Son at bedside.  Repeat CT done this morning. Possible repeat ercp this afternoon.  GI, oncology, PT following  Plan of Care Reviewed With: patient  Overall Patient Progress: no change  Goal: Patient-Specific Goal (Individualized)  Description: You can add care plan individualizations to a care plan. Examples of Individualization might be:  \"Parent requests to be called daily at 9am for status\", \"I have a hard time hearing out of my right ear\", or \"Do not touch me to wake me up as it startles  me\".  Outcome: Not Progressing  Goal: Absence of Hospital-Acquired Illness or Injury  Outcome: Not Progressing  Intervention: Identify and Manage Fall Risk  Recent Flowsheet Documentation  Taken 6/30/2025 0459 by Maricel Goncalves, RN  Safety Promotion/Fall Prevention:   activity supervised   assistive device/personal items within reach   " clutter free environment maintained   safety round/check completed  Intervention: Prevent Skin Injury  Recent Flowsheet Documentation  Taken 6/30/2025 0950 by Maricel Goncalves RN  Body Position: position changed independently  Goal: Optimal Comfort and Wellbeing  Outcome: Not Progressing  Goal: Readiness for Transition of Care  Outcome: Not Progressing     Problem: Fall Injury Risk  Goal: Absence of Fall and Fall-Related Injury  Outcome: Not Progressing  Intervention: Identify and Manage Contributors  Recent Flowsheet Documentation  Taken 6/30/2025 0950 by Maricel Goncalves RN  Medication Review/Management: medications reviewed  Intervention: Promote Injury-Free Environment  Recent Flowsheet Documentation  Taken 6/30/2025 0950 by Maricel Goncalves RN  Safety Promotion/Fall Prevention:   activity supervised   assistive device/personal items within reach   clutter free environment maintained   safety round/check completed     Problem: Acute Kidney Injury/Impairment  Goal: Fluid and Electrolyte Balance  Outcome: Not Progressing  Goal: Improved Oral Intake  Outcome: Not Progressing  Goal: Effective Renal Function  Outcome: Not Progressing  Intervention: Monitor and Support Renal Function  Recent Flowsheet Documentation  Taken 6/30/2025 0950 by Maricel Goncalves RN  Medication Review/Management: medications reviewed     Problem: Liver Failure  Goal: Optimal Coping with Liver Failure  Outcome: Not Progressing  Goal: Absence of Bleeding  Outcome: Not Progressing  Goal: Fluid and Electrolyte Balance  Outcome: Not Progressing  Goal: Optimal Gastrointestinal Function  Outcome: Not Progressing  Goal: Blood Glucose Level Within Target Range  Outcome: Not Progressing  Goal: Hemodynamic Stability  Outcome: Not Progressing  Goal: Absence of Infection Signs and Symptoms  Outcome: Not Progressing  Goal: Optimal Neurologic Function  Outcome: Not Progressing  Intervention: Monitor and Optimize Neurologic Status  Recent Flowsheet  Documentation  Taken 6/30/2025 0950 by Maricel Goncalves, RN  Head of Bed ([facility]) Positioning: HOB at 30 degrees  Goal: Improved Oral Intake  Outcome: Not Progressing  Goal: Optimal Pain Control, Comfort and Function  Outcome: Not Progressing  Goal: Effective Oxygenation and Ventilation  Outcome: Not Progressing  Intervention: Promote Airway Secretion Clearance  Recent Flowsheet Documentation  Taken 6/30/2025 0950 by Maricel Goncalves, RN  Cough And Deep Breathing: done independently per patient  Activity Management: activity adjusted per tolerance  Intervention: Optimize Oxygenation and Ventilation  Recent Flowsheet Documentation  Taken 6/30/2025 0950 by Maricel Goncalves, RN  Head of Bed ([facility]) Positioning: HOB at 30 degrees

## 2025-06-30 NOTE — PLAN OF CARE
"Shift [id number]  VSS.  A&Ox4, lethargic.  On RA.  Pt denies pain.  On remeron for appetite per DO note.  Reg diet, low appetite.  K, Mg, and Phos, AM rechecks.  ALT and AST increased.  R PIV, S/L.  Son at bedside.  SBA.  Hem/onc following.  Liver biopsy pending.        Goal Outcome Evaluation:      Plan of Care Reviewed With: patient    Overall Patient Progress: decliningOverall Patient Progress: declining    Outcome Evaluation: A&Ox4, lethargic. Pt denies pain. K, Mg, and Phos, AM rechecks. ALT and AST increased. Hem/onc following. Liver biopsy pending.        Problem: Adult Inpatient Plan of Care  Goal: Plan of Care Review  Description: The Plan of Care Review/Shift note should be completed every shift.  The Outcome Evaluation is a brief statement about your assessment that the patient is improving, declining, or no change.  This information will be displayed automatically on your shift  note.  6/29/2025 2256 by Catina Chun RN  Outcome: Progressing  Flowsheets (Taken 6/29/2025 2256)  Outcome Evaluation: A&Ox4, lethargic. Pt denies pain. K, Mg, and Phos, AM rechecks. ALT and AST increased. Hem/onc following. Liver biopsy pending.  Overall Patient Progress: declining  6/29/2025 2238 by Catina Chun, RN  Outcome: Progressing  Flowsheets (Taken 6/29/2025 2238)  Outcome Evaluation: A&Ox4, lethargic. Pt denies pain. ALT and AST increased. Hem/onc following. Liver biopsy pending.  Plan of Care Reviewed With: patient  Overall Patient Progress: no change  6/29/2025 2223 by Catina Chun, RN  Outcome: Progressing  Flowsheets (Taken 6/29/2025 2223)  Outcome Evaluation: A&Ox4, lethargic. On remeron for appetite per DO note.  ALT and AST increased. Hem/onc following. Liver biopsy pending.  Plan of Care Reviewed With: patient  Overall Patient Progress: no change  Goal: Patient-Specific Goal (Individualized)  Description: You can add care plan individualizations to a care plan. Examples of Individualization might be:  \"Parent " "requests to be called daily at 9am for status\", \"I have a hard time hearing out of my right ear\", or \"Do not touch me to wake me up as it startles  me\".  6/29/2025 2256 by Catina Chun RN  Outcome: Progressing  6/29/2025 2238 by Catina Chun RN  Outcome: Progressing  6/29/2025 2223 by Catina Chun RN  Outcome: Progressing  Goal: Absence of Hospital-Acquired Illness or Injury  6/29/2025 2256 by Catina Chun RN  Outcome: Progressing  6/29/2025 2238 by Catina Chun RN  Outcome: Progressing  6/29/2025 2223 by Catina Chun RN  Outcome: Progressing  Intervention: Identify and Manage Fall Risk  Recent Flowsheet Documentation  Taken 6/29/2025 2105 by Catina Chun RN  Safety Promotion/Fall Prevention: safety round/check completed  Taken 6/29/2025 2050 by Catina Chun RN  Safety Promotion/Fall Prevention: safety round/check completed  Taken 6/29/2025 1610 by Catina Chun RN  Safety Promotion/Fall Prevention:   activity supervised   assistive device/personal items within reach   clutter free environment maintained   safety round/check completed  Taken 6/29/2025 1520 by Catina Chun RN  Safety Promotion/Fall Prevention: safety round/check completed  Intervention: Prevent Skin Injury  Recent Flowsheet Documentation  Taken 6/29/2025 1610 by Catina Chun RN  Body Position: position changed independently  Intervention: Prevent Infection  Recent Flowsheet Documentation  Taken 6/29/2025 1610 by Catina Chun RN  Infection Prevention:   cohorting utilized   environmental surveillance performed   equipment surfaces disinfected   hand hygiene promoted   personal protective equipment utilized   rest/sleep promoted   single patient room provided  Goal: Optimal Comfort and Wellbeing  6/29/2025 2256 by Catina Chun RN  Outcome: Progressing  6/29/2025 2238 by Catina Chun RN  Outcome: Progressing  6/29/2025 2223 by Catina Chun RN  Outcome: Progressing  Goal: Readiness for Transition of Care  6/29/2025 2256 by Catina Chun, " RN  Outcome: Progressing  6/29/2025 2238 by Catina Chun RN  Outcome: Progressing  6/29/2025 2223 by Catina Chun RN  Outcome: Progressing

## 2025-06-30 NOTE — PLAN OF CARE
"Pt is drowsy/lethargic, but arousable to touch. A&Ox4., VSS, on RA. No complaints of pain or nausea. Skin and sclera are yellow. Possible repeat ERCP date TBD. Recent Bili is 26.7. Regular diet, poor appeptite. IV is SL. I+O's. On K/mag/Phos protocol, labs tomorrow am. Son is at bedside attentive to pt. SBA    Problem: Adult Inpatient Plan of Care  Goal: Plan of Care Review  Description: The Plan of Care Review/Shift note should be completed every shift.  The Outcome Evaluation is a brief statement about your assessment that the patient is improving, declining, or no change.  This information will be displayed automatically on your shift  note.  Outcome: Progressing  Flowsheets (Taken 6/30/2025 1805)  Outcome Evaluation: Pt is lethargic, sleeping in between cares. Son is at bedside. Regular diet. Repeat ERCP date TBD. GI, onc, PT are following.  Plan of Care Reviewed With: patient  Overall Patient Progress: no change  Goal: Patient-Specific Goal (Individualized)  Description: You can add care plan individualizations to a care plan. Examples of Individualization might be:  \"Parent requests to be called daily at 9am for status\", \"I have a hard time hearing out of my right ear\", or \"Do not touch me to wake me up as it startles  me\".  Outcome: Progressing  Goal: Absence of Hospital-Acquired Illness or Injury  Outcome: Progressing  Intervention: Prevent Skin Injury  Recent Flowsheet Documentation  Taken 6/30/2025 1706 by Salud Martinez, RN  Body Position:   position changed independently   side-lying  Intervention: Prevent Infection  Recent Flowsheet Documentation  Taken 6/30/2025 1706 by Salud Martinez, RN  Infection Prevention:   rest/sleep promoted   single patient room provided  Goal: Optimal Comfort and Wellbeing  Outcome: Progressing  Goal: Readiness for Transition of Care  Outcome: Progressing     Problem: Fall Injury Risk  Goal: Absence of Fall and Fall-Related Injury  Outcome: Progressing     Problem: Acute Kidney " Injury/Impairment  Goal: Fluid and Electrolyte Balance  Outcome: Progressing  Goal: Improved Oral Intake  Outcome: Progressing  Goal: Effective Renal Function  Outcome: Progressing     Problem: Liver Failure  Goal: Optimal Coping with Liver Failure  Outcome: Progressing  Goal: Absence of Bleeding  Outcome: Progressing  Goal: Fluid and Electrolyte Balance  Outcome: Progressing  Goal: Optimal Gastrointestinal Function  Outcome: Progressing  Goal: Blood Glucose Level Within Target Range  Outcome: Progressing  Goal: Hemodynamic Stability  Outcome: Progressing  Goal: Absence of Infection Signs and Symptoms  Outcome: Progressing  Goal: Optimal Neurologic Function  Outcome: Progressing  Intervention: Monitor and Optimize Neurologic Status  Recent Flowsheet Documentation  Taken 6/30/2025 1706 by Salud Martinez RN  Head of Bed (HOB) Positioning: HOB at 20 degrees  Goal: Improved Oral Intake  Outcome: Progressing  Goal: Optimal Pain Control, Comfort and Function  Outcome: Progressing  Goal: Effective Oxygenation and Ventilation  Outcome: Progressing  Intervention: Promote Airway Secretion Clearance  Recent Flowsheet Documentation  Taken 6/30/2025 1706 by Salud Martinez RN  Cough And Deep Breathing: done independently per patient  Activity Management:   activity adjusted per tolerance   ambulated to bathroom  Intervention: Optimize Oxygenation and Ventilation  Recent Flowsheet Documentation  Taken 6/30/2025 1706 by Salud Martinez RN  Head of Bed (HOB) Positioning: HOB at 20 degrees   Goal Outcome Evaluation:      Plan of Care Reviewed With: patient    Overall Patient Progress: no changeOverall Patient Progress: no change    Outcome Evaluation: Pt is lethargic, sleeping in between cares. Son is at bedside. Regular diet. Repeat ERCP date TBD. GI, onc, PT are following.

## 2025-06-30 NOTE — PLAN OF CARE
"Patient presents sleeping in bed with son at bedside. Lethergic, SBA with transfers in room due to generalized weakness. Liver mass biopsy pending, oncology to follow.     Goal Outcome Evaluation:      Plan of Care Reviewed With: patient    Overall Patient Progress: no changeOverall Patient Progress: no change    Outcome Evaluation: alert and oriented, lethargic, slept all night      Problem: Adult Inpatient Plan of Care  Goal: Plan of Care Review  Description: The Plan of Care Review/Shift note should be completed every shift.  The Outcome Evaluation is a brief statement about your assessment that the patient is improving, declining, or no change.  This information will be displayed automatically on your shift  note.  Outcome: Not Progressing  Flowsheets (Taken 6/30/2025 0600)  Outcome Evaluation: alert and oriented, lethargic, slept all night  Plan of Care Reviewed With: patient  Overall Patient Progress: no change  Goal: Patient-Specific Goal (Individualized)  Description: You can add care plan individualizations to a care plan. Examples of Individualization might be:  \"Parent requests to be called daily at 9am for status\", \"I have a hard time hearing out of my right ear\", or \"Do not touch me to wake me up as it startles  me\".  Outcome: Not Progressing  Goal: Absence of Hospital-Acquired Illness or Injury  Outcome: Not Progressing  Intervention: Identify and Manage Fall Risk  Recent Flowsheet Documentation  Taken 6/30/2025 0200 by Fadia Gann, RN  Safety Promotion/Fall Prevention:   safety round/check completed   room organization consistent   room near nurse's station   nonskid shoes/slippers when out of bed   mobility aid in reach   lighting adjusted   clutter free environment maintained  Goal: Optimal Comfort and Wellbeing  Outcome: Not Progressing  Goal: Readiness for Transition of Care  Outcome: Not Progressing           "

## 2025-06-30 NOTE — PROGRESS NOTES
"GASTROENTEROLOGY PROGRESS NOTE     SUBJECTIVE:  - Called back today, bilirubin not decreasing much in the last few days  - Patient resting comfortably in bed    OBJECTIVE:  /60 (BP Location: Left arm)   Pulse 90   Temp 98.5  F (36.9  C) (Oral)   Resp 18   Ht 1.727 m (5' 8\")   Wt 56.4 kg (124 lb 4.8 oz)   SpO2 94%   BMI 18.90 kg/m    Temp (24hrs), Av.6  F (37  C), Min:98.5  F (36.9  C), Max:98.6  F (37  C)    No data found.    Intake/Output Summary (Last 24 hours) at 2025 1720  Last data filed at 2025 2100  Gross per 24 hour   Intake 480 ml   Output --   Net 480 ml        General Appearance: alert, oriented x3, no acute distress.  Eyes: sclera anicteric.  ENT: oropharynx clear, no thrush.  CV: RRR.  Resp: CTA bilaterally.  GI: Soft, NABS, NT/ND.    Neuro: no asterixis.      Labs:  Recent Labs   Lab Test 25  0712 25  1054 25  0712 25  0647 25  0709 25  0711 25  0642   WBC 17.7* 15.9* 13.7*   < > 12.8*   < > 14.5*   HGB 10.7* 10.2* 10.4*   < > 10.2*   < > 11.3*   MCV 94 93 93   < > 91   < > 91    231 236   < > 284   < > 277   INR  --   --   --   --  1.14  --  1.09    < > = values in this interval not displayed.     Recent Labs   Lab Test 25  0712 25  1748 25  1054 25  0712   POTASSIUM 3.7 3.7 3.3* 3.7   CHLORIDE 114*  --  112* 110*   CO2 22  --  22 24   BUN 41.1*  --  37.7* 38.3*   ANIONGAP 10  --  9 6*     Recent Labs   Lab Test 25  0712 25  1054 25  0712 25  0711 25  0833 25  0642   ALBUMIN 2.2* 2.2* 2.3*   < >  --  3.0*   BILITOTAL 26.7* 26.3* 25.1*   < >  --  33.4*   * 216* 116*   < >  --  140*   * 230* 121*   < >  --  135*   PROTEIN  --   --   --   --  10*  --    LIPASE  --   --   --   --   --  31    < > = values in this interval not displayed.        Assessment:  Zoltan Daltno is a 60 year old year old male who is admitted with obstructive jaundice likely due to " malignancy.     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 Awaiting pathology results  S/p ERCP with sphincterotomy and plastic stents 6/24    Unfortunately bilirubin not downtrending as much as we would like after stenting -- worry about inadequate drainage    Recommendations:  - Discussed with biliary physicians today - they would consider trying to repeat an ERCP with stenting to try to improve drainage. Timing TBD    40 minutes of total time was spent providing patient care, including patient evaluation, reviewing documentation/test results, and .          Balaji Rosas MD  Thank you for the opportunity to participate in the care of this patient.   Please feel free to call with any questions or concerns.  [phone].

## 2025-07-01 LAB
ALBUMIN SERPL BCG-MCNC: 2.4 G/DL (ref 3.5–5.2)
ALP SERPL-CCNC: 411 U/L (ref 40–150)
ALT SERPL W P-5'-P-CCNC: 205 U/L (ref 0–70)
ANION GAP SERPL CALCULATED.3IONS-SCNC: 11 MMOL/L (ref 7–15)
ANION GAP SERPL CALCULATED.3IONS-SCNC: 12 MMOL/L (ref 7–15)
AST SERPL W P-5'-P-CCNC: 153 U/L (ref 0–45)
BILIRUB SERPL-MCNC: 27.2 MG/DL
BUN SERPL-MCNC: 53.9 MG/DL (ref 8–23)
BUN SERPL-MCNC: 54.4 MG/DL (ref 8–23)
CALCIUM SERPL-MCNC: 11.3 MG/DL (ref 8.8–10.4)
CALCIUM SERPL-MCNC: 11.4 MG/DL (ref 8.8–10.4)
CHLORIDE SERPL-SCNC: 115 MMOL/L (ref 98–107)
CHLORIDE SERPL-SCNC: 115 MMOL/L (ref 98–107)
CREAT SERPL-MCNC: 1.84 MG/DL (ref 0.67–1.17)
CREAT SERPL-MCNC: 1.89 MG/DL (ref 0.67–1.17)
EGFRCR SERPLBLD CKD-EPI 2021: 40 ML/MIN/1.73M2
EGFRCR SERPLBLD CKD-EPI 2021: 41 ML/MIN/1.73M2
ERYTHROCYTE [DISTWIDTH] IN BLOOD BY AUTOMATED COUNT: 25.8 % (ref 10–15)
GLUCOSE SERPL-MCNC: 119 MG/DL (ref 70–99)
GLUCOSE SERPL-MCNC: 120 MG/DL (ref 70–99)
HCO3 SERPL-SCNC: 21 MMOL/L (ref 22–29)
HCO3 SERPL-SCNC: 22 MMOL/L (ref 22–29)
HCT VFR BLD AUTO: 30.1 % (ref 40–53)
HGB BLD-MCNC: 10.8 G/DL (ref 13.3–17.7)
MAGNESIUM SERPL-MCNC: 2.6 MG/DL (ref 1.7–2.3)
MCH RBC QN AUTO: 34.5 PG (ref 26.5–33)
MCHC RBC AUTO-ENTMCNC: 35.9 G/DL (ref 31.5–36.5)
MCV RBC AUTO: 96 FL (ref 78–100)
PHOSPHATE SERPL-MCNC: 3.6 MG/DL (ref 2.5–4.5)
PLATELET # BLD AUTO: 271 10E3/UL (ref 150–450)
POTASSIUM SERPL-SCNC: 3.5 MMOL/L (ref 3.4–5.3)
POTASSIUM SERPL-SCNC: 3.6 MMOL/L (ref 3.4–5.3)
PROT SERPL-MCNC: 4.6 G/DL (ref 6.4–8.3)
RBC # BLD AUTO: 3.13 10E6/UL (ref 4.4–5.9)
SODIUM SERPL-SCNC: 147 MMOL/L (ref 135–145)
SODIUM SERPL-SCNC: 149 MMOL/L (ref 135–145)
WBC # BLD AUTO: 18 10E3/UL (ref 4–11)

## 2025-07-01 PROCEDURE — 82310 ASSAY OF CALCIUM: CPT | Performed by: STUDENT IN AN ORGANIZED HEALTH CARE EDUCATION/TRAINING PROGRAM

## 2025-07-01 PROCEDURE — 250N000013 HC RX MED GY IP 250 OP 250 PS 637: Performed by: STUDENT IN AN ORGANIZED HEALTH CARE EDUCATION/TRAINING PROGRAM

## 2025-07-01 PROCEDURE — 120N000001 HC R&B MED SURG/OB

## 2025-07-01 PROCEDURE — 84155 ASSAY OF PROTEIN SERUM: CPT | Performed by: STUDENT IN AN ORGANIZED HEALTH CARE EDUCATION/TRAINING PROGRAM

## 2025-07-01 PROCEDURE — 84100 ASSAY OF PHOSPHORUS: CPT | Performed by: STUDENT IN AN ORGANIZED HEALTH CARE EDUCATION/TRAINING PROGRAM

## 2025-07-01 PROCEDURE — 85014 HEMATOCRIT: CPT | Performed by: STUDENT IN AN ORGANIZED HEALTH CARE EDUCATION/TRAINING PROGRAM

## 2025-07-01 PROCEDURE — 83735 ASSAY OF MAGNESIUM: CPT | Performed by: STUDENT IN AN ORGANIZED HEALTH CARE EDUCATION/TRAINING PROGRAM

## 2025-07-01 PROCEDURE — 99232 SBSQ HOSP IP/OBS MODERATE 35: CPT | Performed by: STUDENT IN AN ORGANIZED HEALTH CARE EDUCATION/TRAINING PROGRAM

## 2025-07-01 PROCEDURE — P9047 ALBUMIN (HUMAN), 25%, 50ML: HCPCS | Performed by: STUDENT IN AN ORGANIZED HEALTH CARE EDUCATION/TRAINING PROGRAM

## 2025-07-01 PROCEDURE — 250N000013 HC RX MED GY IP 250 OP 250 PS 637: Performed by: NURSE PRACTITIONER

## 2025-07-01 PROCEDURE — 36415 COLL VENOUS BLD VENIPUNCTURE: CPT | Performed by: STUDENT IN AN ORGANIZED HEALTH CARE EDUCATION/TRAINING PROGRAM

## 2025-07-01 PROCEDURE — 250N000011 HC RX IP 250 OP 636: Performed by: STUDENT IN AN ORGANIZED HEALTH CARE EDUCATION/TRAINING PROGRAM

## 2025-07-01 RX ORDER — ALBUMIN (HUMAN) 12.5 G/50ML
25 SOLUTION INTRAVENOUS ONCE
Status: COMPLETED | OUTPATIENT
Start: 2025-07-01 | End: 2025-07-01

## 2025-07-01 RX ORDER — ALBUMIN (HUMAN) 12.5 G/50ML
12.5 SOLUTION INTRAVENOUS ONCE
Status: DISCONTINUED | OUTPATIENT
Start: 2025-07-01 | End: 2025-07-01

## 2025-07-01 RX ORDER — DEXTROSE MONOHYDRATE 50 MG/ML
INJECTION, SOLUTION INTRAVENOUS CONTINUOUS
Status: CANCELLED | OUTPATIENT
Start: 2025-07-01

## 2025-07-01 RX ADMIN — MIRTAZAPINE 15 MG: 15 TABLET, FILM COATED ORAL at 21:06

## 2025-07-01 RX ADMIN — ALBUMIN HUMAN 25 G: 0.25 SOLUTION INTRAVENOUS at 17:39

## 2025-07-01 RX ADMIN — PROCHLORPERAZINE MALEATE 10 MG: 5 TABLET ORAL at 09:44

## 2025-07-01 RX ADMIN — ALBUMIN HUMAN 25 G: 0.25 SOLUTION INTRAVENOUS at 09:44

## 2025-07-01 RX ADMIN — DEXTROSE 1000 ML: 5 SOLUTION INTRAVENOUS at 18:35

## 2025-07-01 RX ADMIN — SENNOSIDES AND DOCUSATE SODIUM 1 TABLET: 50; 8.6 TABLET ORAL at 21:06

## 2025-07-01 ASSESSMENT — ACTIVITIES OF DAILY LIVING (ADL)
ADLS_ACUITY_SCORE: 49
ADLS_ACUITY_SCORE: 45
ADLS_ACUITY_SCORE: 49
ADLS_ACUITY_SCORE: 49
ADLS_ACUITY_SCORE: 45
ADLS_ACUITY_SCORE: 49
ADLS_ACUITY_SCORE: 45
ADLS_ACUITY_SCORE: 45
ADLS_ACUITY_SCORE: 49
ADLS_ACUITY_SCORE: 45
ADLS_ACUITY_SCORE: 49
ADLS_ACUITY_SCORE: 49
ADLS_ACUITY_SCORE: 45
ADLS_ACUITY_SCORE: 49
ADLS_ACUITY_SCORE: 49
ADLS_ACUITY_SCORE: 45
ADLS_ACUITY_SCORE: 49
ADLS_ACUITY_SCORE: 45
ADLS_ACUITY_SCORE: 49
ADLS_ACUITY_SCORE: 45
ADLS_ACUITY_SCORE: 45
ADLS_ACUITY_SCORE: 49
ADLS_ACUITY_SCORE: 45

## 2025-07-01 NOTE — PLAN OF CARE
"[id number]  VSS on RA. Lethargic. Disoriented to time. Denies pain, cp, n/v, sob. Yellow skin and sclera. GI following.     Goal Outcome Evaluation:      Plan of Care Reviewed With: patient, family    Overall Patient Progress: no changeOverall Patient Progress: no change    Outcome Evaluation: Lethagic this shift.      Problem: Adult Inpatient Plan of Care  Goal: Plan of Care Review  Description: The Plan of Care Review/Shift note should be completed every shift.  The Outcome Evaluation is a brief statement about your assessment that the patient is improving, declining, or no change.  This information will be displayed automatically on your shift  note.  Outcome: Not Progressing  Flowsheets (Taken 6/30/2025 2346)  Outcome Evaluation: Lethagic this shift.  Plan of Care Reviewed With:   patient   family  Overall Patient Progress: no change  Goal: Patient-Specific Goal (Individualized)  Description: You can add care plan individualizations to a care plan. Examples of Individualization might be:  \"Parent requests to be called daily at 9am for status\", \"I have a hard time hearing out of my right ear\", or \"Do not touch me to wake me up as it startles  me\".  Outcome: Not Progressing  Goal: Absence of Hospital-Acquired Illness or Injury  Outcome: Not Progressing  Intervention: Identify and Manage Fall Risk  Recent Flowsheet Documentation  Taken 6/30/2025 2146 by Fabrizio Dunlap RN  Safety Promotion/Fall Prevention:   supervised activity   safety round/check completed   room organization consistent   room near nurse's station   patient and family education   clutter free environment maintained  Intervention: Prevent Skin Injury  Recent Flowsheet Documentation  Taken 6/30/2025 2146 by Fabrizio Dunlap RN  Body Position: position changed independently  Intervention: Prevent and Manage VTE (Venous Thromboembolism) Risk  Recent Flowsheet Documentation  Taken 6/30/2025 2146 by Fabrizio Dunlap RN  VTE Prevention/Management: " patient refused intervention  Intervention: Prevent Infection  Recent Flowsheet Documentation  Taken 6/30/2025 2146 by Fabrizio Dunlap RN  Infection Prevention:   rest/sleep promoted   single patient room provided  Goal: Optimal Comfort and Wellbeing  Outcome: Not Progressing  Goal: Readiness for Transition of Care  Outcome: Not Progressing     Problem: Fall Injury Risk  Goal: Absence of Fall and Fall-Related Injury  Outcome: Not Progressing  Intervention: Identify and Manage Contributors  Recent Flowsheet Documentation  Taken 6/30/2025 2146 by Fabrizio Dunlap RN  Medication Review/Management: medications reviewed  Intervention: Promote Injury-Free Environment  Recent Flowsheet Documentation  Taken 6/30/2025 2146 by Fabrizio Dunlap RN  Safety Promotion/Fall Prevention:   supervised activity   safety round/check completed   room organization consistent   room near nurse's station   patient and family education   clutter free environment maintained     Problem: Acute Kidney Injury/Impairment  Goal: Fluid and Electrolyte Balance  Outcome: Not Progressing  Goal: Improved Oral Intake  Outcome: Not Progressing  Goal: Effective Renal Function  Outcome: Not Progressing  Intervention: Monitor and Support Renal Function  Recent Flowsheet Documentation  Taken 6/30/2025 2146 by Fabrizio Dunlap RN  Medication Review/Management: medications reviewed     Problem: Liver Failure  Goal: Optimal Coping with Liver Failure  Outcome: Not Progressing  Goal: Absence of Bleeding  Outcome: Not Progressing  Goal: Fluid and Electrolyte Balance  Outcome: Not Progressing  Goal: Optimal Gastrointestinal Function  Outcome: Not Progressing  Goal: Blood Glucose Level Within Target Range  Outcome: Not Progressing  Goal: Hemodynamic Stability  Outcome: Not Progressing  Goal: Absence of Infection Signs and Symptoms  Outcome: Not Progressing  Goal: Optimal Neurologic Function  Outcome: Not Progressing  Intervention: Monitor and Optimize  Neurologic Status  Recent Flowsheet Documentation  Taken 6/30/2025 2146 by Fabrizio Dunlap RN  Head of Bed (HOB) Positioning: HOB lowered  Goal: Improved Oral Intake  Outcome: Not Progressing  Goal: Optimal Pain Control, Comfort and Function  Outcome: Not Progressing  Goal: Effective Oxygenation and Ventilation  Outcome: Not Progressing  Intervention: Promote Airway Secretion Clearance  Recent Flowsheet Documentation  Taken 6/30/2025 2146 by Fabrizio Dunlap RN  Activity Management: activity adjusted per tolerance  Intervention: Optimize Oxygenation and Ventilation  Recent Flowsheet Documentation  Taken 6/30/2025 2146 by Fabrizio Dunlap RN  Head of Bed (HOB) Positioning: HOB lowered

## 2025-07-01 NOTE — PROGRESS NOTES
"CLINICAL NUTRITION SERVICES - REASSESSMENT NOTE     Registered Dietitian Interventions:  Continue diet order as ordered    Continue Remeron    Oral nutrition supplements: Continue with Ensure Plus HP TID for now    Ordered updated weight    Discussed w/ bedside RN - will continue encouragement w/ oral intake as able    Future/Additional Recommendations:  Given pt's severe malnutrition and consistently poor PO, would recommend consideration of nutrition support if plans to remain restorative. Messaged provider     INFORMATION OBTAINED  Assessed patient in room.    CURRENT NUTRITION ORDERS  Diet: Regular - will go NPO at midnight tonight for ERCP  Snacks/Supplements: Vanilla Ensure Plus HP TID w/ meals    CURRENT INTAKE/TOLERANCE  Per RN flowsheet, 25-50% intakes and poor appetite. 1-2 meals being ordered per day.    Attempted to speak w/ pt at bedside today about appetite/supplements. Pt struggling to stay awake during conversation, but was able to say \"yes\" when asked if the supplements were okay.  No family at bedside when I was there.  RN was carrying tray out of room when I got there. Large amount of food left on tray w/ multiple unopened containers. Appeared that 2-3 bites had been taken.     NEW FINDINGS  Pt continues to be lethargic.   No improvement in liver function tests since stent placement on 6/26.  Plan for repeat ERCP on 7/2.  Awaiting pathology results of liver biopsy.    GI symptoms: Reviewed    - Stools: LBM 6/27  - No documented emesis this admission    Skin/wounds: Reviewed    - Edema:    2+ (mild) to L hand   1+ (trace) to b/l legs and feet  - Pressure Injuries/Nonhealing Wounds: None currently documented    Nutrition-relevant labs: Reviewed  Noted: Na 147(H), BUN 53.9(H), Cr 1.89(H), Mag 2.6(H), Alk phos 411(H), ALT/AST high but trending down over the last several days, Bili 27.2(H!),     Nutrition-relevant medications: Reviewed  Noted: remeron, senokot    Weight:   Vitals:    06/24/25 " 0628 06/24/25 1500   Weight: 59 kg (130 lb 1.1 oz) 56.4 kg (124 lb 4.8 oz)     No recent weights. Ordered updated weight.    ASSESSED NUTRITION NEEDS  Dosing Weight: 56.4 kg, based on actual wt (standing scale)  Estimated Energy Needs: 4393-0768 kcals/day (30 - 35 kcals/kg)  Justification: Underweight, increased needs  Estimated Protein Needs: 68-85 grams protein/day (1.2 - 1.5 grams of pro/kg)  Justification: Increased needs  Estimated Fluid Needs: Per provider pending fluid s    MALNUTRITION  % Intake: </= 50% for >/= 5 days (severe) and </=75% for >/= 1 month (severe)  % Weight Loss: > 10% in 6 months (severe)   Subcutaneous Fat Loss: Severe facial losses - pt tucked in blanket and struggling to wake up  Muscle Loss: Temples (temporalis muscle): Severe and Clavicles (pectoralis and deltoids): Severe - visual, pt covered in blankets  (based on 6/28 assessment. Unable to visualize today d/t pt tucked in blanket and struggling to wake up)  Fluid Accumulation/Edema: Does not meet criteria  Malnutrition Diagnosis: Severe malnutrition in the context of chronic illness  Malnutrition Present on Admission: Yes    EVALUATION OF THE PROGRESS TOWARD GOALS   Previous Goals  Patient to consume % of nutritionally adequate meal trays TID, or the equivalent with supplements/snacks.   Evaluation: Not progressing      Previous Nutrition Diagnosis  Inadequate oral intake related to new possible metastatic disease as evidenced by pt report of intake, unintentional weight loss, and severe muscle/fat depletions.   Evaluation: Declining    NUTRITION DIAGNOSIS  Inadequate oral intake related to lethargy, new possible metastatic disease as evidenced by intakes this admission, weight loss, severe muscle/fat depletions, daily appetite stimulant.    INTERVENTIONS  See nutrition interventions above    GOALS  Patient to consume >/= 50% of nutritionally adequate meals or supplements at least BID to start to show improved trending of  "intakes.     MONITORING/EVALUATION  Progress toward goals will be monitored and evaluated per policy.      Pennie Cisse RD, LD  Clinical Dietitian  [facility] Message Group: \"Dietitian [[place]]\"  Office Phone: [phone]    "

## 2025-07-01 NOTE — PLAN OF CARE
"End of Shift Summary                          [id number]        Pertinent assessments: A&O x 4, drowsy - not lethargic this shift. Opened eyes to voice, able to answer questions approprietly. VSS RA.  Pain: denied      Shift Events: uneventful.      Plan: patho pending, possible repeat ERCP w/ stenting to improve drainage per GI. GI following      Discharge: tbd. Outpt PET scan.                Temp: 97.8  F (36.6  C) Temp src: Oral BP: 123/68 Pulse: 80   Resp: 14 SpO2: 94 % O2 Device: None (Room air)         Goal Outcome Evaluation:       Pt, family    Overall Patient Progress: no changeOverall Patient Progress: no change    Outcome Evaluation: less lethargic      Problem: Adult Inpatient Plan of Care  Goal: Plan of Care Review  Description: The Plan of Care Review/Shift note should be completed every shift.  The Outcome Evaluation is a brief statement about your assessment that the patient is improving, declining, or no change.  This information will be displayed automatically on your shift  note.  Outcome: Progressing  Flowsheets (Taken 7/1/2025 0447)  Outcome Evaluation: less lethargic  Overall Patient Progress: no change  Goal: Patient-Specific Goal (Individualized)  Description: You can add care plan individualizations to a care plan. Examples of Individualization might be:  \"Parent requests to be called daily at 9am for status\", \"I have a hard time hearing out of my right ear\", or \"Do not touch me to wake me up as it startles  me\".  Outcome: Progressing  Goal: Absence of Hospital-Acquired Illness or Injury  Outcome: Progressing  Intervention: Identify and Manage Fall Risk  Recent Flowsheet Documentation  Taken 7/1/2025 0432 by Maribel Locke, RN  Safety Promotion/Fall Prevention: safety round/check completed  Taken 7/1/2025 0301 by Maribel Locke, RN  Safety Promotion/Fall Prevention: safety round/check completed  Taken 7/1/2025 0050 by Maribel Locke, RN  Safety Promotion/Fall Prevention: safety round/check " completed  Intervention: Prevent and Manage VTE (Venous Thromboembolism) Risk  Recent Flowsheet Documentation  Taken 7/1/2025 0050 by Maribel Locke RN  VTE Prevention/Management: patient refused intervention  Intervention: Prevent Infection  Recent Flowsheet Documentation  Taken 7/1/2025 0050 by Maribel Locke RN  Infection Prevention:   equipment surfaces disinfected   rest/sleep promoted   single patient room provided  Goal: Optimal Comfort and Wellbeing  Outcome: Progressing  Goal: Readiness for Transition of Care  Outcome: Progressing     Problem: Fall Injury Risk  Goal: Absence of Fall and Fall-Related Injury  Outcome: Progressing  Intervention: Identify and Manage Contributors  Recent Flowsheet Documentation  Taken 7/1/2025 0050 by Maribel Locke RN  Medication Review/Management: medications reviewed  Intervention: Promote Injury-Free Environment  Recent Flowsheet Documentation  Taken 7/1/2025 0432 by Maribel Locke RN  Safety Promotion/Fall Prevention: safety round/check completed  Taken 7/1/2025 0301 by Maribel Locke RN  Safety Promotion/Fall Prevention: safety round/check completed  Taken 7/1/2025 0050 by Maribel Locke RN  Safety Promotion/Fall Prevention: safety round/check completed     Problem: Acute Kidney Injury/Impairment  Goal: Fluid and Electrolyte Balance  Outcome: Progressing  Goal: Improved Oral Intake  Outcome: Progressing  Goal: Effective Renal Function  Outcome: Progressing  Intervention: Monitor and Support Renal Function  Recent Flowsheet Documentation  Taken 7/1/2025 0050 by Maribel Locke RN  Medication Review/Management: medications reviewed     Problem: Liver Failure  Goal: Optimal Coping with Liver Failure  Outcome: Progressing  Goal: Absence of Bleeding  Outcome: Progressing  Goal: Fluid and Electrolyte Balance  Outcome: Progressing  Goal: Optimal Gastrointestinal Function  Outcome: Progressing  Goal: Blood Glucose Level Within Target Range  Outcome: Progressing  Goal: Hemodynamic  Stability  Outcome: Progressing  Goal: Absence of Infection Signs and Symptoms  Outcome: Progressing  Goal: Optimal Neurologic Function  Outcome: Progressing  Goal: Improved Oral Intake  Outcome: Progressing  Goal: Optimal Pain Control, Comfort and Function  Outcome: Progressing  Goal: Effective Oxygenation and Ventilation  Outcome: Progressing  Intervention: Promote Airway Secretion Clearance  Recent Flowsheet Documentation  Taken 7/1/2025 0050 by Maribel Locke RN  Cough And Deep Breathing: done independently per patient

## 2025-07-01 NOTE — PROGRESS NOTES
[facility]    Medicine Progress Note - Hospitalist Service    Date of Admission:  6/24/2025    Assessment & Plan   Zoltan Dalton is a 60 year old male with PMH of tobacco use disorder, who was admitted on 6/24/2025 with abdominal pain.    Metastatic malignancy - hepatobiliary vs pancreatic source  Multiple liver lesions and intrahepatic biliary ductal dilatation s/p stenting 6/26  Early satiety  40lb weight loss  Epigastric and abdominal pain  Failure to thrive  Presents with months of above symptoms. Does not have a PCP or interact with the healthcare system often. Hx of ~6 drinks/week for years.   Initial labs notable for tbili 33.4, alk phos 489, AST//140, Cr 2.24, WBC 14.5. Abs US showed moderate to severe intrahepatic biliary ductal dilatation in L hepatic lobe, common duct dilated to 12mm, and heterogenous liver with multiple indeterminate hypoechoic lesions. CT CAP with findings worrisome for liver malignancy- hepatic masses, severe L>R intrahepatic biliary dilatation; several indeterminate pulmonary nodules bilaterally, and small ascites.   Paracentesis unable to be completed 2/2 small volume ascites. AFP<1.8, CA 19-9: 31, CEA 43.1. Hepatitis panel negative.   MRCP showed massive intrahepatic ductal dilatation and findings that may represent intrahepatic obstructing cholangiocarcinoma vs pancreatic neoplasm with mets.   ERCP 6/26 showed biliary obstruction at the hilum 2/2 masses seen on EUS. 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 Underwent sphincterotomy and stenting.   EUS 6/26 with innumerable hepatic masses with obstruction of bile duct at hilum. FNA done.  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 FNA positive for adenocarcinoma - smears and cell block show clusters of malignant glandular groups consistent with at least a moderately differentiated  adenocarcinoma. Correlation with imaging and EUS/ERCP findings is recommended. Awaiting oncology recs now that FNA has resulted.   LFTs have not improved since stenting. GI planning for repeat ERCP.   - Repeat ERCP 7/2  - Follow up FNA results  - Oncology consult   - Outpatient PET scan and followup  - Start mirtazapine for appetite    - Still with little PO intake, may need to consider nutrition support if no improvement after ERCP  - Consider palliative consult    Transaminitis   Likely related to above. GI stated it will take time for LFTs to improve after stenting, but it has not improved/mildly worsened.  - Repeat ERCP 7/2  - Trend LFTs    Leukocytosis   Rising WBC but no new infectious symptoms. Also with worsening PIYUSH and LFTs. CT CAP done and showed moderate bilateral pleural effusions and atelectasis, interlobular septal thickening and groundglass attenuation in lower lobes, suggestive of edema, atelectasis, and/or pneumonitis.   Suspect leukocytosis is related to malignancy and not acute infection.  - Trend WBC    PIYUSH  Possible CKD  Cr 2.24 from unclear baseline. Likely pre-renal given improvement with IVF. Cr remains elevated at 1.4. wonder if this is his baseline.  Had new rise in Cr to 1.8. becoming more edematous with IVF. Serum albumin 2.4.   - Albumin x1, consider additional doses   - Avoid nephrotoxins  - AM BMP    Pleural effusions  Possible pulmonary edema  Noted on CT 6/30. No respiratory symptoms, no hypoxia. Has been receiving IVF for PIYUSH.     Colonic mass, ruled out  Previous colonoscopy '21 unremarkable, though had 4mm tubular adenoma in transverse colon on colonoscopy '15.   Colonoscopy on 6/26 showed a normal colon.     Bilateral pulmonary nodules  Possible metastatic disease vs infectious or inflammatory.   - Follow up CT chest in 3 months pending above workup    Tobacco use disorder - daily smoker, encourage cessation          Diet: Snacks/Supplements Adult: Ensure Plus High Protein; With  Meals  Regular Diet Adult    DVT Prophylaxis: Pneumatic Compression Devices  Maki Catheter: Not present  Lines: None     Cardiac Monitoring: None  Code Status: Full Code      Clinically Significant Risk Factors        # Hypokalemia: Lowest K = 3.3 mmol/L in last 2 days, will replace as needed  # Hypernatremia: Highest Na = 146 mmol/L in last 2 days, will monitor as appropriate  # Hyperchloremia: Highest Cl = 114 mmol/L in last 2 days, will monitor as appropriate       # Hypercalcemia: Highest Ca = 10.9 mg/dL in last 2 days, will monitor as appropriate    # Hypoalbuminemia: Lowest albumin = 2.2 g/dL at 6/30/2025  7:12 AM, will monitor as appropriate                 # Severe Malnutrition: based on nutrition assessment and treatment provided per dietitian's recommendations.           Social Drivers of Health    Tobacco Use: Medium Risk (6/26/2025)    Patient History     Smoking Tobacco Use: Former     Smokeless Tobacco Use: Never   Alcohol Use: Unknown (3/4/2021)    AUDIT-C     Frequency of Alcohol Consumption: 2-4 times a month     Average Number of Drinks: Not asked     Frequency of Binge Drinking: Not asked          Disposition Plan     Medically Ready for Discharge: Anticipated in 2-4 Days             Jane Rhodes,   Hospitalist Service  [facility]  Securely message with [employer] (more info)  Text page via "[employer] Paging/Directory   ______________________________________________________________________    Interval History   No acute overnight events. Feeling ok today. Labs with worsening kidney function. Will trial albumin as he is getting edematous with IVF. Plan for repeat ERCP tomorrow. FNA confirmed cancer but did not specify source. Oncology will discuss results further.     Physical Exam   Vital Signs: Temp: 97.8  F (36.6  C) Temp src: Oral BP: 123/68 Pulse: 80   Resp: 14 SpO2: 94 % O2 Device: None (Room air)    Weight: 124 lbs 4.8 oz    Constitutional: Awake, alert, no distress,  and cooperative  Cardiovascular: Regular rate and rhythm, normal S1 and S2, no S3 or S4, and no murmur noted  Respiratory: No increased work of breathing, good air exchange, clear to auscultation bilaterally, no crackles or wheezing  Gastrointestinal: Abdomen soft, non-tender, non-distended. BS normal. No masses, organomegaly     Medical Decision Making       45 MINUTES SPENT BY ME on the date of service doing chart review, history, exam, documentation & further activities per the note.      Data     I have personally reviewed the following data over the past 24 hrs:    18.0 (H)  \   10.8 (L)   / 271     147 (H) 115 (H) 53.9 (H) /  119 (H)   3.6 21 (L) 1.89 (H) \     ALT: 205 (H) AST: 153 (H) AP: 411 (H) TBILI: 27.2 (HH)   ALB: 2.4 (L) TOT PROTEIN: 4.6 (L) LIPASE: N/A

## 2025-07-01 NOTE — PROGRESS NOTES
"GASTROENTEROLOGY PROGRESS NOTE     SUBJECTIVE:  No acute events. Denies abdominal pain. Resting comfortably in bed.      OBJECTIVE:  /48 (BP Location: Left arm)   Pulse 86   Temp 98.6  F (37  C) (Oral)   Resp 18   Ht 1.727 m (5' 8\")   Wt 56.4 kg (124 lb 4.8 oz)   SpO2 94%   BMI 18.90 kg/m    Temp (24hrs), Av.3  F (36.8  C), Min:97.8  F (36.6  C), Max:98.6  F (37  C)    No data found.    Intake/Output Summary (Last 24 hours) at 2025 1207  Last data filed at 2025 0853  Gross per 24 hour   Intake 480 ml   Output --   Net 480 ml        PHYSICAL EXAM  GENERAL: No obvious distress  EYES: No scleral icterus  ABDOMEN: Non-distended. Positive bowel sounds. Soft. Non-tender.  SKIN: No jaundice  NEUROLOGIC: Alert and oriented. No asterixis.   PSYCHIATRIC: Normal affect     Additional Comments:  ROS, FH, SH: See initial GI consult for details.     I have reviewed the patient's new clinical lab results:     Recent Labs   Lab Test 25  0725  0712 25  1054 25  0647 25  0709 25  0711 25  0642   WBC 18.0* 17.7* 15.9*   < > 12.8*   < > 14.5*   HGB 10.8* 10.7* 10.2*   < > 10.2*   < > 11.3*   MCV 96 94 93   < > 91   < > 91    241 231   < > 284   < > 277   INR  --   --   --   --  1.14  --  1.09    < > = values in this interval not displayed.     Recent Labs   Lab Test 25  0723 25  0712 25  1748 25  1054   POTASSIUM 3.6 3.7 3.7 3.3*   CHLORIDE 115* 114*  --  112*   CO2 21* 22  --  22   BUN 53.9* 41.1*  --  37.7*   ANIONGAP 11 10  --  9     Recent Labs   Lab Test 25  0723 25  0712 25  1054 25  0711 25  0833 25  0642   ALBUMIN 2.4* 2.2* 2.2*   < >  --  3.0*   BILITOTAL 27.2* 26.7* 26.3*   < >  --  33.4*   * 207* 216*   < >  --  140*   * 173* 230*   < >  --  135*   PROTEIN  --   --   --   --  10*  --    LIPASE  --   --   --   --   --  31    < > = values in this interval not displayed. "       IMAGING:   CT CAP w/o contrast 6/24/25  IMPRESSION:  1.  Findings worrisome for malignancy within the liver. The liver appears heterogeneous with suggestion of underlying hepatic masses primarily at the right liver. There is severe left worse than right intrahepatic biliary ductal dilatation with cutoff of the biliary tree at the central liver. Recommend correlation with hepatic MRI and MRCP.  2.  Indeterminate focal region of wall thickening and decompression at the mid ascending colon. This should be considered a colonic mass until proven otherwise. Recommend correlation with colonoscopy.  3.  Several indeterminate pulmonary nodules bilaterally. Metastatic disease is possible. This could also relate to an atypical infectious or inflammatory etiology. Recommend follow-up short interval CT chest in 3 months.  4.  Small ascites. A few borderline prominent upper abdominal lymph nodes are suggested but these are limited in view.  5.  Technically indeterminate small sclerosis at the left ischial tuberosity.  6.  Mild anasarca.    ENDOSCOPIC EVALUATION:  EUS 6/26/25  Impression:         - Innumerable hepatic masses with obstruction of                             the bile duct at the hilum. Fine-needle aspiration                             as above.      ERCP 6/26/25  Impression:                - Biliary obstruction at the hilum secondary to masses seen on EUS. 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  Sphincterotomy, stenting as above.     ASSESSMENT:  60 year old who is admitted with obstructive jaundice likely due to malignancy.     # Obstructive jaundice  # Biliary mass  # S/p ERCP with sphincterotomy and plastic stents 6/24/25   Bilirubin has not decreased much since ERCP. GI called back for consideration of repeat ERCP as stents might not be draining adequately.     PLAN:   - Plan for ERCP tomorrow with Dr. Delgadillo at 11 AM  - NPO  at midnight    Discussed patient findings and plan with Dr. Rosas.     Total time: 25 minutes of total time was spent providing patient care, including patient evaluation, reviewing documentation/test results, and .     Hannah Mandujano PA-C  [facility] ([facility])

## 2025-07-01 NOTE — PLAN OF CARE
"Aox4, VSS, on RA, no tele.  PT getting albumin for my shift, second dose this evening.  PT also getting a 1L D5W bolus.  ERCP done showed a bili obstruction, will do repeat tomorrow.  NPO at midnight for repeat ERCP.  On k/mg/phos, recheck in the AM.  No complaints of pain or nausea.  Discharge TBD.    Goal Outcome Evaluation:      Plan of Care Reviewed With: patient    Overall Patient Progress: no changeOverall Patient Progress: no change    Outcome Evaluation: 2nd dose of albumin given, NPO at midnight for repeat ERCP      Problem: Adult Inpatient Plan of Care  Goal: Plan of Care Review  Description: The Plan of Care Review/Shift note should be completed every shift.  The Outcome Evaluation is a brief statement about your assessment that the patient is improving, declining, or no change.  This information will be displayed automatically on your shift  note.  Outcome: Progressing  Flowsheets (Taken 7/1/2025 4165)  Outcome Evaluation: 2nd dose of albumin given, NPO at midnight for repeat ERCP  Plan of Care Reviewed With: patient  Overall Patient Progress: no change  Goal: Patient-Specific Goal (Individualized)  Description: You can add care plan individualizations to a care plan. Examples of Individualization might be:  \"Parent requests to be called daily at 9am for status\", \"I have a hard time hearing out of my right ear\", or \"Do not touch me to wake me up as it startles  me\".  Outcome: Progressing  Goal: Absence of Hospital-Acquired Illness or Injury  Outcome: Progressing  Intervention: Identify and Manage Fall Risk  Recent Flowsheet Documentation  Taken 7/1/2025 1612 by Laurie Barcenas RN  Safety Promotion/Fall Prevention:   activity supervised   assistive device/personal items within reach   clutter free environment maintained   safety round/check completed  Intervention: Prevent Skin Injury  Recent Flowsheet Documentation  Taken 7/1/2025 1612 by Laurie Barcenas, RN  Body Position: position changed " independently  Goal: Optimal Comfort and Wellbeing  Outcome: Progressing  Goal: Readiness for Transition of Care  Outcome: Progressing     Problem: Acute Kidney Injury/Impairment  Goal: Fluid and Electrolyte Balance  Outcome: Progressing  Goal: Improved Oral Intake  Outcome: Progressing  Goal: Effective Renal Function  Outcome: Progressing     Problem: Liver Failure  Goal: Optimal Coping with Liver Failure  Outcome: Progressing  Goal: Absence of Bleeding  Outcome: Progressing  Goal: Fluid and Electrolyte Balance  Outcome: Progressing  Goal: Optimal Gastrointestinal Function  Outcome: Progressing  Intervention: Monitor and Support Gastrointestinal Function  Recent Flowsheet Documentation  Taken 7/1/2025 1612 by Laurie Barcenas RN  Nausea/Vomiting Interventions: antiemetic  Goal: Blood Glucose Level Within Target Range  Outcome: Progressing  Goal: Hemodynamic Stability  Outcome: Progressing  Goal: Absence of Infection Signs and Symptoms  Outcome: Progressing  Goal: Optimal Neurologic Function  Outcome: Progressing  Intervention: Monitor and Optimize Neurologic Status  Recent Flowsheet Documentation  Taken 7/1/2025 1612 by Laurie Barcenas RN  Head of Bed ([facility]) Positioning: HOB at 30 degrees  Goal: Improved Oral Intake  Outcome: Progressing  Goal: Optimal Pain Control, Comfort and Function  Outcome: Progressing  Goal: Effective Oxygenation and Ventilation  Outcome: Progressing  Intervention: Promote Airway Secretion Clearance  Recent Flowsheet Documentation  Taken 7/1/2025 1612 by Laurie Barcenas RN  Cough And Deep Breathing: done independently per patient  Activity Management: activity adjusted per tolerance  Intervention: Optimize Oxygenation and Ventilation  Recent Flowsheet Documentation  Taken 7/1/2025 1612 by Laurie Barcenas RN  Head of Bed ([facility]) Positioning: HOB at 30 degrees

## 2025-07-01 NOTE — PLAN OF CARE
"15:53 RN shift summary 7-3:   Pt oriented, but is sleepy.  Son at bedside.  Ambulates with SBA.  Lungs clear, diminished.  94% RA. No tele. Regular diet.  NPO at midnight for repeat ERCP tomorrow. Voiding without difficulty.  PIV saline locked.  Albumin given today. Oncology, GI, nutrition and PT following.           Goal Outcome Evaluation:      Plan of Care Reviewed With: child    Overall Patient Progress: no changeOverall Patient Progress: no change    Outcome Evaluation: Bilirubin 27.2  Albumin given.  Repeat ERCP tomorrow. GI and PT following.      Problem: Adult Inpatient Plan of Care  Goal: Plan of Care Review  Description: The Plan of Care Review/Shift note should be completed every shift.  The Outcome Evaluation is a brief statement about your assessment that the patient is improving, declining, or no change.  This information will be displayed automatically on your shift  note.  Outcome: Not Progressing  Flowsheets (Taken 7/1/2025 8444)  Outcome Evaluation: Bilirubin 27.2  Albumin given.  Repeat ERCP tomorrow. GI and PT following.  Plan of Care Reviewed With: child  Overall Patient Progress: no change  Goal: Patient-Specific Goal (Individualized)  Description: You can add care plan individualizations to a care plan. Examples of Individualization might be:  \"Parent requests to be called daily at 9am for status\", \"I have a hard time hearing out of my right ear\", or \"Do not touch me to wake me up as it startles  me\".  Outcome: Not Progressing  Goal: Absence of Hospital-Acquired Illness or Injury  Outcome: Not Progressing  Intervention: Identify and Manage Fall Risk  Recent Flowsheet Documentation  Taken 7/1/2025 0945 by Maricel Goncalves, RN  Safety Promotion/Fall Prevention:   activity supervised   assistive device/personal items within reach   clutter free environment maintained   safety round/check completed  Intervention: Prevent Skin Injury  Recent Flowsheet Documentation  Taken 7/1/2025 0945 by Ivanna" Maricel RN  Body Position: position changed independently  Goal: Optimal Comfort and Wellbeing  Outcome: Not Progressing  Goal: Readiness for Transition of Care  Outcome: Not Progressing     Problem: Fall Injury Risk  Goal: Absence of Fall and Fall-Related Injury  Outcome: Not Progressing  Intervention: Identify and Manage Contributors  Recent Flowsheet Documentation  Taken 7/1/2025 0945 by Maricel Goncalves, RN  Medication Review/Management: medications reviewed  Intervention: Promote Injury-Free Environment  Recent Flowsheet Documentation  Taken 7/1/2025 0945 by Maricel Goncalves, RN  Safety Promotion/Fall Prevention:   activity supervised   assistive device/personal items within reach   clutter free environment maintained   safety round/check completed     Problem: Acute Kidney Injury/Impairment  Goal: Fluid and Electrolyte Balance  Outcome: Not Progressing  Goal: Improved Oral Intake  Outcome: Not Progressing  Goal: Effective Renal Function  Outcome: Not Progressing  Intervention: Monitor and Support Renal Function  Recent Flowsheet Documentation  Taken 7/1/2025 0945 by Maricel Goncalves, RN  Medication Review/Management: medications reviewed     Problem: Liver Failure  Goal: Optimal Coping with Liver Failure  Outcome: Not Progressing  Goal: Absence of Bleeding  Outcome: Not Progressing  Goal: Fluid and Electrolyte Balance  Outcome: Not Progressing  Goal: Optimal Gastrointestinal Function  Outcome: Not Progressing  Intervention: Monitor and Support Gastrointestinal Function  Recent Flowsheet Documentation  Taken 7/1/2025 0945 by Maricel Goncalves RN  Nausea/Vomiting Interventions: antiemetic  Goal: Blood Glucose Level Within Target Range  Outcome: Not Progressing  Goal: Hemodynamic Stability  Outcome: Not Progressing  Goal: Absence of Infection Signs and Symptoms  Outcome: Not Progressing  Goal: Optimal Neurologic Function  Outcome: Not Progressing  Intervention: Monitor and Optimize Neurologic Status  Recent Flowsheet  Documentation  Taken 7/1/2025 0945 by Maricel Goncalves, RN  Head of Bed ([facility]) Positioning: HOB at 30 degrees  Goal: Improved Oral Intake  Outcome: Not Progressing  Goal: Optimal Pain Control, Comfort and Function  Outcome: Not Progressing  Goal: Effective Oxygenation and Ventilation  Outcome: Not Progressing  Intervention: Promote Airway Secretion Clearance  Recent Flowsheet Documentation  Taken 7/1/2025 0945 by Maricel Goncalves, RN  Cough And Deep Breathing: done independently per patient  Activity Management: activity adjusted per tolerance  Intervention: Optimize Oxygenation and Ventilation  Recent Flowsheet Documentation  Taken 7/1/2025 0945 by Maricel Goncalves, RN  Head of Bed ([facility]) Positioning: HOB at 30 degrees

## 2025-07-02 ENCOUNTER — ANESTHESIA EVENT (OUTPATIENT)
Dept: SURGERY | Facility: CLINIC | Age: 60
End: 2025-07-02

## 2025-07-02 ENCOUNTER — ANESTHESIA (OUTPATIENT)
Dept: SURGERY | Facility: CLINIC | Age: 60
End: 2025-07-02

## 2025-07-02 LAB
ALBUMIN SERPL BCG-MCNC: 2.8 G/DL (ref 3.5–5.2)
ALP SERPL-CCNC: 351 U/L (ref 40–150)
ALT SERPL W P-5'-P-CCNC: 166 U/L (ref 0–70)
ANION GAP SERPL CALCULATED.3IONS-SCNC: 12 MMOL/L (ref 7–15)
AST SERPL W P-5'-P-CCNC: 112 U/L (ref 0–45)
BILIRUB SERPL-MCNC: 31 MG/DL
BUN SERPL-MCNC: 50 MG/DL (ref 8–23)
CALCIUM SERPL-MCNC: 11.5 MG/DL (ref 8.8–10.4)
CHLORIDE SERPL-SCNC: 114 MMOL/L (ref 98–107)
CREAT SERPL-MCNC: 1.63 MG/DL (ref 0.67–1.17)
EGFRCR SERPLBLD CKD-EPI 2021: 48 ML/MIN/1.73M2
ERCP: NORMAL
ERYTHROCYTE [DISTWIDTH] IN BLOOD BY AUTOMATED COUNT: 25.4 % (ref 10–15)
GLUCOSE BLDC GLUCOMTR-MCNC: 102 MG/DL (ref 70–99)
GLUCOSE BLDC GLUCOMTR-MCNC: 109 MG/DL (ref 70–99)
GLUCOSE BLDC GLUCOMTR-MCNC: 122 MG/DL (ref 70–99)
GLUCOSE SERPL-MCNC: 117 MG/DL (ref 70–99)
HCO3 SERPL-SCNC: 21 MMOL/L (ref 22–29)
HCT VFR BLD AUTO: 28 % (ref 40–53)
HGB BLD-MCNC: 10.1 G/DL (ref 13.3–17.7)
MAGNESIUM SERPL-MCNC: 2.6 MG/DL (ref 1.7–2.3)
MCH RBC QN AUTO: 34.6 PG (ref 26.5–33)
MCHC RBC AUTO-ENTMCNC: 36.1 G/DL (ref 31.5–36.5)
MCV RBC AUTO: 96 FL (ref 78–100)
PHOSPHATE SERPL-MCNC: 2.4 MG/DL (ref 2.5–4.5)
PLATELET # BLD AUTO: 252 10E3/UL (ref 150–450)
POTASSIUM SERPL-SCNC: 2.9 MMOL/L (ref 3.4–5.3)
POTASSIUM SERPL-SCNC: 4.2 MMOL/L (ref 3.4–5.3)
PROT SERPL-MCNC: 4.5 G/DL (ref 6.4–8.3)
RBC # BLD AUTO: 2.92 10E6/UL (ref 4.4–5.9)
SODIUM SERPL-SCNC: 147 MMOL/L (ref 135–145)
WBC # BLD AUTO: 16 10E3/UL (ref 4–11)

## 2025-07-02 PROCEDURE — 250N000009 HC RX 250: Performed by: INTERNAL MEDICINE

## 2025-07-02 PROCEDURE — 250N000011 HC RX IP 250 OP 636: Performed by: INTERNAL MEDICINE

## 2025-07-02 PROCEDURE — 250N000009 HC RX 250: Performed by: NURSE ANESTHETIST, CERTIFIED REGISTERED

## 2025-07-02 PROCEDURE — 83735 ASSAY OF MAGNESIUM: CPT | Performed by: STUDENT IN AN ORGANIZED HEALTH CARE EDUCATION/TRAINING PROGRAM

## 2025-07-02 PROCEDURE — 710N000012 HC RECOVERY PHASE 2, PER MINUTE: Performed by: INTERNAL MEDICINE

## 2025-07-02 PROCEDURE — 250N000013 HC RX MED GY IP 250 OP 250 PS 637: Performed by: STUDENT IN AN ORGANIZED HEALTH CARE EDUCATION/TRAINING PROGRAM

## 2025-07-02 PROCEDURE — 250N000011 HC RX IP 250 OP 636: Performed by: NURSE ANESTHETIST, CERTIFIED REGISTERED

## 2025-07-02 PROCEDURE — 255N000002 HC RX 255 OP 636: Performed by: INTERNAL MEDICINE

## 2025-07-02 PROCEDURE — C1769 GUIDE WIRE: HCPCS | Performed by: INTERNAL MEDICINE

## 2025-07-02 PROCEDURE — 360N000075 HC SURGERY LEVEL 2, PER MIN: Performed by: INTERNAL MEDICINE

## 2025-07-02 PROCEDURE — 258N000003 HC RX IP 258 OP 636: Performed by: ANESTHESIOLOGY

## 2025-07-02 PROCEDURE — 99232 SBSQ HOSP IP/OBS MODERATE 35: CPT | Performed by: INTERNAL MEDICINE

## 2025-07-02 PROCEDURE — 370N000017 HC ANESTHESIA TECHNICAL FEE, PER MIN: Performed by: INTERNAL MEDICINE

## 2025-07-02 PROCEDURE — C1726 CATH, BAL DIL, NON-VASCULAR: HCPCS | Performed by: INTERNAL MEDICINE

## 2025-07-02 PROCEDURE — 258N000003 HC RX IP 258 OP 636: Performed by: INTERNAL MEDICINE

## 2025-07-02 PROCEDURE — 84132 ASSAY OF SERUM POTASSIUM: CPT | Performed by: INTERNAL MEDICINE

## 2025-07-02 PROCEDURE — 36415 COLL VENOUS BLD VENIPUNCTURE: CPT | Performed by: INTERNAL MEDICINE

## 2025-07-02 PROCEDURE — 0F7D8DZ DILATION OF PANCREATIC DUCT WITH INTRALUMINAL DEVICE, VIA NATURAL OR ARTIFICIAL OPENING ENDOSCOPIC: ICD-10-PCS | Performed by: INTERNAL MEDICINE

## 2025-07-02 PROCEDURE — 250N000011 HC RX IP 250 OP 636: Performed by: NURSE PRACTITIONER

## 2025-07-02 PROCEDURE — C2617 STENT, NON-COR, TEM W/O DEL: HCPCS | Performed by: INTERNAL MEDICINE

## 2025-07-02 PROCEDURE — 0FPD8DZ REMOVAL OF INTRALUMINAL DEVICE FROM PANCREATIC DUCT, VIA NATURAL OR ARTIFICIAL OPENING ENDOSCOPIC: ICD-10-PCS | Performed by: INTERNAL MEDICINE

## 2025-07-02 PROCEDURE — 250N000013 HC RX MED GY IP 250 OP 250 PS 637: Performed by: NURSE PRACTITIONER

## 2025-07-02 PROCEDURE — 85027 COMPLETE CBC AUTOMATED: CPT | Performed by: STUDENT IN AN ORGANIZED HEALTH CARE EDUCATION/TRAINING PROGRAM

## 2025-07-02 PROCEDURE — 272N000001 HC OR GENERAL SUPPLY STERILE: Performed by: INTERNAL MEDICINE

## 2025-07-02 PROCEDURE — 120N000001 HC R&B MED SURG/OB

## 2025-07-02 PROCEDURE — 84100 ASSAY OF PHOSPHORUS: CPT | Performed by: STUDENT IN AN ORGANIZED HEALTH CARE EDUCATION/TRAINING PROGRAM

## 2025-07-02 PROCEDURE — 999N000141 HC STATISTIC PRE-PROCEDURE NURSING ASSESSMENT: Performed by: INTERNAL MEDICINE

## 2025-07-02 PROCEDURE — 36415 COLL VENOUS BLD VENIPUNCTURE: CPT | Performed by: STUDENT IN AN ORGANIZED HEALTH CARE EDUCATION/TRAINING PROGRAM

## 2025-07-02 PROCEDURE — 80053 COMPREHEN METABOLIC PANEL: CPT | Performed by: STUDENT IN AN ORGANIZED HEALTH CARE EDUCATION/TRAINING PROGRAM

## 2025-07-02 PROCEDURE — 272N000002 HC OR SUPPLY OTHER OPNP: Performed by: INTERNAL MEDICINE

## 2025-07-02 PROCEDURE — 258N000003 HC RX IP 258 OP 636: Performed by: NURSE ANESTHETIST, CERTIFIED REGISTERED

## 2025-07-02 RX ORDER — LABETALOL HYDROCHLORIDE 5 MG/ML
10 INJECTION, SOLUTION INTRAVENOUS
Status: DISCONTINUED | OUTPATIENT
Start: 2025-07-02 | End: 2025-07-02 | Stop reason: HOSPADM

## 2025-07-02 RX ORDER — FLUMAZENIL 0.1 MG/ML
0.2 INJECTION, SOLUTION INTRAVENOUS
Status: ACTIVE | OUTPATIENT
Start: 2025-07-02 | End: 2025-07-03

## 2025-07-02 RX ORDER — NALOXONE HYDROCHLORIDE 0.4 MG/ML
0.1 INJECTION, SOLUTION INTRAMUSCULAR; INTRAVENOUS; SUBCUTANEOUS
Status: DISCONTINUED | OUTPATIENT
Start: 2025-07-02 | End: 2025-07-02 | Stop reason: HOSPADM

## 2025-07-02 RX ORDER — PROPOFOL 10 MG/ML
INJECTION, EMULSION INTRAVENOUS CONTINUOUS PRN
Status: DISCONTINUED | OUTPATIENT
Start: 2025-07-02 | End: 2025-07-02

## 2025-07-02 RX ORDER — ONDANSETRON 2 MG/ML
4 INJECTION INTRAMUSCULAR; INTRAVENOUS EVERY 30 MIN PRN
Status: DISCONTINUED | OUTPATIENT
Start: 2025-07-02 | End: 2025-07-02 | Stop reason: HOSPADM

## 2025-07-02 RX ORDER — GLYCOPYRROLATE 0.2 MG/ML
INJECTION, SOLUTION INTRAMUSCULAR; INTRAVENOUS PRN
Status: DISCONTINUED | OUTPATIENT
Start: 2025-07-02 | End: 2025-07-02

## 2025-07-02 RX ORDER — ONDANSETRON 4 MG/1
4 TABLET, ORALLY DISINTEGRATING ORAL EVERY 30 MIN PRN
Status: DISCONTINUED | OUTPATIENT
Start: 2025-07-02 | End: 2025-07-02 | Stop reason: HOSPADM

## 2025-07-02 RX ORDER — DEXAMETHASONE SODIUM PHOSPHATE 4 MG/ML
4 INJECTION, SOLUTION INTRA-ARTICULAR; INTRALESIONAL; INTRAMUSCULAR; INTRAVENOUS; SOFT TISSUE
Status: DISCONTINUED | OUTPATIENT
Start: 2025-07-02 | End: 2025-07-02 | Stop reason: HOSPADM

## 2025-07-02 RX ORDER — SODIUM CHLORIDE, SODIUM LACTATE, POTASSIUM CHLORIDE, CALCIUM CHLORIDE 600; 310; 30; 20 MG/100ML; MG/100ML; MG/100ML; MG/100ML
INJECTION, SOLUTION INTRAVENOUS CONTINUOUS PRN
Status: DISCONTINUED | OUTPATIENT
Start: 2025-07-02 | End: 2025-07-02

## 2025-07-02 RX ORDER — ONDANSETRON 2 MG/ML
4 INJECTION INTRAMUSCULAR; INTRAVENOUS EVERY 6 HOURS PRN
Status: DISCONTINUED | OUTPATIENT
Start: 2025-07-02 | End: 2025-07-02

## 2025-07-02 RX ORDER — OXYCODONE HYDROCHLORIDE 5 MG/1
5 TABLET ORAL
Status: DISCONTINUED | OUTPATIENT
Start: 2025-07-02 | End: 2025-07-02 | Stop reason: HOSPADM

## 2025-07-02 RX ORDER — FENTANYL CITRATE 50 UG/ML
50 INJECTION, SOLUTION INTRAMUSCULAR; INTRAVENOUS EVERY 5 MIN PRN
Status: DISCONTINUED | OUTPATIENT
Start: 2025-07-02 | End: 2025-07-02 | Stop reason: HOSPADM

## 2025-07-02 RX ORDER — PROCHLORPERAZINE MALEATE 10 MG
10 TABLET ORAL EVERY 6 HOURS PRN
Status: DISCONTINUED | OUTPATIENT
Start: 2025-07-02 | End: 2025-07-02

## 2025-07-02 RX ORDER — ONDANSETRON 4 MG/1
4 TABLET, ORALLY DISINTEGRATING ORAL EVERY 6 HOURS PRN
Status: DISCONTINUED | OUTPATIENT
Start: 2025-07-02 | End: 2025-07-02

## 2025-07-02 RX ORDER — HYDROMORPHONE HYDROCHLORIDE 1 MG/ML
0.5 INJECTION, SOLUTION INTRAMUSCULAR; INTRAVENOUS; SUBCUTANEOUS EVERY 5 MIN PRN
Status: DISCONTINUED | OUTPATIENT
Start: 2025-07-02 | End: 2025-07-02 | Stop reason: HOSPADM

## 2025-07-02 RX ORDER — FENTANYL CITRATE 50 UG/ML
25 INJECTION, SOLUTION INTRAMUSCULAR; INTRAVENOUS EVERY 5 MIN PRN
Status: DISCONTINUED | OUTPATIENT
Start: 2025-07-02 | End: 2025-07-02 | Stop reason: HOSPADM

## 2025-07-02 RX ORDER — HYDRALAZINE HYDROCHLORIDE 20 MG/ML
5-10 INJECTION INTRAMUSCULAR; INTRAVENOUS EVERY 10 MIN PRN
Status: DISCONTINUED | OUTPATIENT
Start: 2025-07-02 | End: 2025-07-02 | Stop reason: HOSPADM

## 2025-07-02 RX ORDER — ALBUTEROL SULFATE 0.83 MG/ML
2.5 SOLUTION RESPIRATORY (INHALATION) EVERY 4 HOURS PRN
Status: DISCONTINUED | OUTPATIENT
Start: 2025-07-02 | End: 2025-07-02 | Stop reason: HOSPADM

## 2025-07-02 RX ORDER — LIDOCAINE 40 MG/G
CREAM TOPICAL
Status: DISCONTINUED | OUTPATIENT
Start: 2025-07-02 | End: 2025-07-02 | Stop reason: HOSPADM

## 2025-07-02 RX ORDER — MAGNESIUM SULFATE HEPTAHYDRATE 40 MG/ML
2 INJECTION, SOLUTION INTRAVENOUS
Status: DISCONTINUED | OUTPATIENT
Start: 2025-07-02 | End: 2025-07-02 | Stop reason: HOSPADM

## 2025-07-02 RX ORDER — POTASSIUM CHLORIDE 7.45 MG/ML
10 INJECTION INTRAVENOUS
Status: COMPLETED | OUTPATIENT
Start: 2025-07-02 | End: 2025-07-02

## 2025-07-02 RX ORDER — SODIUM CHLORIDE, SODIUM LACTATE, POTASSIUM CHLORIDE, CALCIUM CHLORIDE 600; 310; 30; 20 MG/100ML; MG/100ML; MG/100ML; MG/100ML
INJECTION, SOLUTION INTRAVENOUS CONTINUOUS
Status: DISCONTINUED | OUTPATIENT
Start: 2025-07-02 | End: 2025-07-02 | Stop reason: HOSPADM

## 2025-07-02 RX ORDER — KETOROLAC TROMETHAMINE 15 MG/ML
15 INJECTION, SOLUTION INTRAMUSCULAR; INTRAVENOUS
Status: DISCONTINUED | OUTPATIENT
Start: 2025-07-02 | End: 2025-07-02 | Stop reason: HOSPADM

## 2025-07-02 RX ORDER — OXYCODONE HYDROCHLORIDE 5 MG/1
10 TABLET ORAL
Status: DISCONTINUED | OUTPATIENT
Start: 2025-07-02 | End: 2025-07-02 | Stop reason: HOSPADM

## 2025-07-02 RX ORDER — LIDOCAINE HYDROCHLORIDE 20 MG/ML
INJECTION, SOLUTION INFILTRATION; PERINEURAL PRN
Status: DISCONTINUED | OUTPATIENT
Start: 2025-07-02 | End: 2025-07-02

## 2025-07-02 RX ORDER — KETAMINE HYDROCHLORIDE 10 MG/ML
INJECTION INTRAMUSCULAR; INTRAVENOUS PRN
Status: DISCONTINUED | OUTPATIENT
Start: 2025-07-02 | End: 2025-07-02

## 2025-07-02 RX ADMIN — PROPOFOL 200 MCG/KG/MIN: 10 INJECTION, EMULSION INTRAVENOUS at 10:58

## 2025-07-02 RX ADMIN — SENNOSIDES AND DOCUSATE SODIUM 1 TABLET: 50; 8.6 TABLET ORAL at 20:35

## 2025-07-02 RX ADMIN — POTASSIUM CHLORIDE 10 MEQ: 7.46 INJECTION, SOLUTION INTRAVENOUS at 16:34

## 2025-07-02 RX ADMIN — POTASSIUM CHLORIDE 10 MEQ: 7.46 INJECTION, SOLUTION INTRAVENOUS at 18:37

## 2025-07-02 RX ADMIN — ONDANSETRON 4 MG: 2 INJECTION INTRAMUSCULAR; INTRAVENOUS at 11:53

## 2025-07-02 RX ADMIN — PROPOFOL 20 MG: 10 INJECTION, EMULSION INTRAVENOUS at 10:59

## 2025-07-02 RX ADMIN — POTASSIUM CHLORIDE 10 MEQ: 7.46 INJECTION, SOLUTION INTRAVENOUS at 15:28

## 2025-07-02 RX ADMIN — SODIUM CHLORIDE, SODIUM LACTATE, POTASSIUM CHLORIDE, AND CALCIUM CHLORIDE: .6; .31; .03; .02 INJECTION, SOLUTION INTRAVENOUS at 10:52

## 2025-07-02 RX ADMIN — PROPOFOL 20 MG: 10 INJECTION, EMULSION INTRAVENOUS at 11:01

## 2025-07-02 RX ADMIN — POTASSIUM CHLORIDE 10 MEQ: 7.46 INJECTION, SOLUTION INTRAVENOUS at 14:25

## 2025-07-02 RX ADMIN — PROPOFOL 10 MG: 10 INJECTION, EMULSION INTRAVENOUS at 11:10

## 2025-07-02 RX ADMIN — SODIUM CHLORIDE, SODIUM LACTATE, POTASSIUM CHLORIDE, AND CALCIUM CHLORIDE 500 ML: .6; .31; .03; .02 INJECTION, SOLUTION INTRAVENOUS at 12:11

## 2025-07-02 RX ADMIN — POTASSIUM CHLORIDE 10 MEQ: 7.46 INJECTION, SOLUTION INTRAVENOUS at 17:42

## 2025-07-02 RX ADMIN — LIDOCAINE HYDROCHLORIDE 100 MG: 20 INJECTION, SOLUTION INFILTRATION; PERINEURAL at 10:53

## 2025-07-02 RX ADMIN — SODIUM PHOSPHATE, MONOBASIC, MONOHYDRATE AND SODIUM PHOSPHATE, DIBASIC, ANHYDROUS 9 MMOL: 142; 276 INJECTION, SOLUTION INTRAVENOUS at 20:23

## 2025-07-02 RX ADMIN — Medication 20 MG: at 11:04

## 2025-07-02 RX ADMIN — MIRTAZAPINE 15 MG: 15 TABLET, FILM COATED ORAL at 22:29

## 2025-07-02 RX ADMIN — POTASSIUM CHLORIDE 10 MEQ: 7.46 INJECTION, SOLUTION INTRAVENOUS at 13:23

## 2025-07-02 RX ADMIN — PROPOFOL 40 MG: 10 INJECTION, EMULSION INTRAVENOUS at 11:04

## 2025-07-02 RX ADMIN — GLYCOPYRROLATE 0.15 MG: 0.2 INJECTION, SOLUTION INTRAMUSCULAR; INTRAVENOUS at 10:52

## 2025-07-02 ASSESSMENT — ACTIVITIES OF DAILY LIVING (ADL)
ADLS_ACUITY_SCORE: 44
ADLS_ACUITY_SCORE: 45
ADLS_ACUITY_SCORE: 45
ADLS_ACUITY_SCORE: 44
ADLS_ACUITY_SCORE: 45
ADLS_ACUITY_SCORE: 45
ADLS_ACUITY_SCORE: 44
ADLS_ACUITY_SCORE: 45
ADLS_ACUITY_SCORE: 45
ADLS_ACUITY_SCORE: 44
ADLS_ACUITY_SCORE: 45
ADLS_ACUITY_SCORE: 44
ADLS_ACUITY_SCORE: 44

## 2025-07-02 NOTE — ANESTHESIA POSTPROCEDURE EVALUATION
Patient: Zoltan Dalton    Procedure: Procedure(s):  ENDOSCOPIC RETROGRADE CHOLANGIOPANCREATOGRAPHY, balloon dilation and stent exchange       Anesthesia Type:  MAC    Note:  Disposition: Inpatient   Postop Pain Control: Uneventful            Sign Out: Well controlled pain   PONV: No   Neuro/Psych: Uneventful            Sign Out: Acceptable/Baseline neuro status   Airway/Respiratory: Uneventful            Sign Out: Acceptable/Baseline resp. status   CV/Hemodynamics: Uneventful            Sign Out: Acceptable CV status   Other NRE: NONE   DID A NON-ROUTINE EVENT OCCUR? No           Last vitals:  Vitals Value Taken Time   /59 07/02/25 13:15   Temp 97.3  F (36.3  C) 07/02/25 12:45   Pulse 78 07/02/25 13:15   Resp 20 07/02/25 13:15   SpO2 98 % 07/02/25 13:15       Electronically Signed By: Kaushik Esparza MD  July 2, 2025  1:30 PM

## 2025-07-02 NOTE — PROGRESS NOTES
[facility]    Hospitalist Progress Note  Name: Zoltan Dalton    MRN: [id number]  Provider: Kristan Ayala MD  Date of Service: 07/02/2025    Assessment & Plan   Summary of Stay: Zoltan Dalton is a 60 year old male who was admitted on 6/24/2025 admitted for obstructive jaundice with metastatic malignancy hepatobiliary versus pancreatic source.  Presented with abdominal pain.  Past medical history significant for tobacco use disorder.    Metastatic malignancy - hepatobiliary vs pancreatic source  Multiple liver lesions and intrahepatic biliary ductal dilatation s/p stenting 6/26  Early satiety  40lb weight loss  Epigastric and abdominal pain  Failure to thrive  Presents with months of above symptoms. Does not have a PCP or interact with the healthcare system often. Hx of ~6 drinks/week for years.   Initial labs notable for tbili 33.4, alk phos 489, AST//140, Cr 2.24, WBC 14.5. Abs US showed moderate to severe intrahepatic biliary ductal dilatation in L hepatic lobe, common duct dilated to 12mm, and heterogenous liver with multiple indeterminate hypoechoic lesions. CT CAP with findings worrisome for liver malignancy- hepatic masses, severe L>R intrahepatic biliary dilatation; several indeterminate pulmonary nodules bilaterally, and small ascites.   Paracentesis unable to be completed 2/2 small volume ascites. AFP<1.8, CA 19-9: 31, CEA 43.1. Hepatitis panel negative.   MRCP showed massive intrahepatic ductal dilatation and findings that may represent intrahepatic obstructing cholangiocarcinoma vs pancreatic neoplasm with mets.   ERCP 6/26 showed biliary obstruction at the hilum 2/2 masses seen on EUS. 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 Underwent sphincterotomy and stenting.   EUS 6/26 with innumerable hepatic masses with obstruction of bile duct at hilum. FNA done.  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 FNA positive for adenocarcinoma - smears and cell block show clusters of malignant glandular groups consistent with at least a moderately differentiated adenocarcinoma. Correlation with imaging and EUS/ERCP findings is recommended. Awaiting oncology recs now that FNA has resulted.   LFTs have not improved since stenting. GI planning for repeat ERCP.   - Repeat ERCP 7/2  - Follow up FNA results  - Oncology consult              - Outpatient PET scan and followup  - Start mirtazapine for appetite               - Still with little PO intake, may need to consider nutrition support if no improvement after ERCP  - Consider palliative consult     Transaminitis   Likely related to above. GI stated it will take time for LFTs to improve after stenting, but it has not improved/mildly worsened.  - Repeat ERCP 7/2  - Trend LFTs     Leukocytosis   Rising WBC but no new infectious symptoms. Also with worsening PIYUSH and LFTs. CT CAP done and showed moderate bilateral pleural effusions and atelectasis, interlobular septal thickening and groundglass attenuation in lower lobes, suggestive of edema, atelectasis, and/or pneumonitis.   Suspect leukocytosis is related to malignancy and not acute infection.  - Trend WBC     PIYUSH  Possible CKD  Cr 2.24 from unclear baseline. Likely pre-renal given improvement with IVF. Cr remains elevated at 1.4. wonder if this is his baseline.  Had new rise in Cr to 1.8. becoming more edematous with IVF. Serum albumin 2.4.   - Albumin x1, consider additional doses   - Avoid nephrotoxins  - AM BMP     Pleural effusions  Possible pulmonary edema  Noted on CT 6/30. No respiratory symptoms, no hypoxia. Has been receiving IVF for PIYUSH.      Colonic mass, ruled out  Previous colonoscopy '21 unremarkable, though had 4mm tubular adenoma in transverse colon on colonoscopy '15.   Colonoscopy on 6/26 showed a normal colon.      Bilateral pulmonary nodules  Possible  metastatic disease vs infectious or inflammatory.   - Follow up CT chest in 3 months pending above workup     Tobacco use disorder - daily smoker, encourage cessation        Diet: Snacks/Supplements Adult: Ensure Plus High Protein; With Meals        DVT Prophylaxis: Pneumatic Compression Devices  Code Status: Full Code    Disposition:   Medically Ready for Discharge: Anticipated in 2-4 Days        Interval History   Assumed care reviewed chart patient sleepy and somnolence after ERCP.  Unable to get more than 10 point review of systems.  Care been discussed with patient's family at bedside.  Total time spent for patient care and coronation of care is more than 20 minutes  -Data reviewed today: I reviewed all new labs and imaging reports over the last 24 hours. I personally reviewed no images or EKG's today.    Physical Exam   Temp: 98.2  F (36.8  C) Temp src: Core BP: (!) 148/73 Pulse: 83   Resp: 16 SpO2: 100 % O2 Device: None (Room air)    Vitals:    06/24/25 0628 06/24/25 1500 07/02/25 0737   Weight: 59 kg (130 lb 1.1 oz) 56.4 kg (124 lb 4.8 oz) 55.1 kg (121 lb 7.6 oz)     Vital Signs with Ranges  Temp:  [98.1  F (36.7  C)-98.7  F (37.1  C)] 98.2  F (36.8  C)  Pulse:  [76-88] 83  Resp:  [16-20] 16  BP: (115-152)/(50-73) 148/73  SpO2:  [93 %-100 %] 100 %  I/O last 3 completed shifts:  In: 240 [P.O.:240]  Out: -       GEN: Sleepy and somnolent, NAD.  HEENT:  Normocephalic/atraumatic, anicteric, no nasal discharge, mouth dry  CV:  Regular rate and rhythm, no murmur or JVD.  S1 + S2 noted, no S3 or S4.  LUNGS:  Clear to auscultation bilaterally without rales/rhonchi/wheezing/retractions.  Symmetric chest rise on inhalation noted.  ABD:  Active bowel sounds, soft, non-tender/non-distended.  No rebound/guarding/rigidity.  EXT:  No edema.  No cyanosis.  No joint synovitis noted.  SKIN:  Dry to touch, no exanthems noted in the visualized areas.    Medications   Current Facility-Administered Medications   Medication Dose  "Route Frequency Provider Last Rate Last Admin     Current Facility-Administered Medications   Medication Dose Route Frequency Provider Last Rate Last Admin    [Auto Hold] mirtazapine (REMERON) tablet 15 mg  15 mg Oral At Bedtime Meagan Jane, DO   15 mg at 07/01/25 2106    [Auto Hold] senna-docusate (SENOKOT-S/PERICOLACE) 8.6-50 MG per tablet 1 tablet  1 tablet Oral BID Ham Nuñez APRN CNP   1 tablet at 07/01/25 2106    Or    [Auto Hold] senna-docusate (SENOKOT-S/PERICOLACE) 8.6-50 MG per tablet 2 tablet  2 tablet Oral BID Ham Nuñez APRN CNP   2 tablet at 06/29/25 1119    sodium chloride (PF) 0.9% PF flush 3 mL  3 mL Intracatheter Q8H Hannah Martinez PA-C   3 mL at 07/02/25 0641    [Auto Hold] sodium chloride (PF) 0.9% PF flush 3 mL  3 mL Intracatheter Q8H [facility] Ham Nuñez APRN CNP   3 mL at 07/01/25 2105    sodium phosphate 9 mmol in 250 mL NS intermittent infusion  9 mmol Intravenous Once Kristan Ayala MD         Data     Recent Labs   Lab 07/02/25  0912 07/01/25  0723 06/30/25  0712   WBC 16.0* 18.0* 17.7*   HGB 10.1* 10.8* 10.7*   HCT 28.0* 30.1* 29.1*   MCV 96 96 94    271 241     Recent Labs   Lab 07/02/25  0932 07/02/25  0912 07/01/25  1548 07/01/25  0723   NA  --  147* 149* 147*   POTASSIUM  --  2.9* 3.5 3.6   CHLORIDE  --  114* 115* 115*   CO2  --  21* 22 21*   ANIONGAP  --  12 12 11   * 117* 120* 119*   BUN  --  50.0* 54.4* 53.9*   CR  --  1.63* 1.84* 1.89*   GFRESTIMATED  --  48* 41* 40*   SANDRA  --  11.5* 11.4* 11.3*     7-Day Micro Results       No results found for the last 168 hours.          Recent Labs   Lab 07/02/25  0912 07/01/25  0723 06/30/25  0712   HGB 10.1* 10.8* 10.7*     Recent Labs   Lab 07/02/25  0912 07/01/25  0723 06/30/25  0712   * 153* 173*   * 205* 207*   ALKPHOS 351* 411* 398*   BILITOTAL 31.0* 27.2* 26.7*     Recent Labs   Lab 06/26/25  0709   INR 1.14     No results for input(s): \"LACT\" in the last 168 " "hours.  No results for input(s): \"LIPASE\" in the last 168 hours.  No results for input(s): \"TSH\" in the last 168 hours.  No results for input(s): \"COLOR\", \"APPEARANCE\", \"URINEGLC\", \"URINEBILI\", \"URINEKETONE\", \"SG\", \"UBLD\", \"URINEPH\", \"PROTEIN\", \"UROBILINOGEN\", \"NITRITE\", \"LEUKEST\", \"RBCU\", \"WBCU\" in the last 168 hours.    No results found for this or any previous visit (from the past 24 hours).       "

## 2025-07-02 NOTE — PROGRESS NOTES
"Notified MD at 1207 PM regarding low BP.      Spoke with: Paged Dr Isaias MAE    Orders 500cc bolus.      BP 96/54   Pulse 71   Temp 97.3  F (36.3  C) (Temporal)   Resp 21   Ht 1.727 m (5' 8\")   Wt 55.1 kg (121 lb 7.6 oz)   SpO2 93%   BMI 18.47 kg/m            "

## 2025-07-02 NOTE — ANESTHESIA CARE TRANSFER NOTE
Patient: Zoltan Dalton    Procedure: Procedure(s):  ENDOSCOPIC RETROGRADE CHOLANGIOPANCREATOGRAPHY, balloon dilation and stent exchange       Diagnosis: Transaminitis [R74.01]  Diagnosis Additional Information: No value filed.    Anesthesia Type:   MAC     Note:    Oropharynx: oropharynx clear of all foreign objects and spontaneously breathing  Level of Consciousness: drowsy  Oxygen Supplementation: face mask  Level of Supplemental Oxygen (L/min / FiO2): 6  Independent Airway: airway patency satisfactory and stable  Dentition: dentition unchanged  Vital Signs Stable: post-procedure vital signs reviewed and stable  Report to RN Given: handoff report given  Patient transferred to: PACU  Comments: SBP 80s.  RN will let Dr. Esparza know if stays low.  Handoff Report: Identifed the Patient, Identified the Reponsible Provider, Reviewed the pertinent medical history, Discussed the surgical course, Reviewed Intra-OP anesthesia mangement and issues during anesthesia, Set expectations for post-procedure period and Allowed opportunity for questions and acknowledgement of understanding  Vitals:  Vitals Value Taken Time   BP 83/57 07/02/25 12:05   Temp     Pulse 94 07/02/25 12:05   Resp 25 07/02/25 12:06   SpO2 98 % 07/02/25 12:06   Vitals shown include unfiled device data.    Electronically Signed By: GUILHERME Catalan CRNA  July 2, 2025  12:07 PM

## 2025-07-02 NOTE — PLAN OF CARE
" Pt. Is A&OX4. Up with SBA. VSS. Pt. is sleepy and somnolence after ERCP. On 2L NC. LS clear. Clear liquid diet. Voiding without difficulty.  Potassium is currently infusing to Right PIV. Phosphorus is schedule to replace at 6PM. Oncology, GI, nutrition and PT following.      Goal Outcome Evaluation:      Plan of Care Reviewed With: patient    Overall Patient Progress: no changeOverall Patient Progress: no change    Outcome Evaluation: ERCP done today. Pt. is sleepy and somnolence after ERCP. VSS. on CAPNO. Pt's son is at bedside.      Problem: Adult Inpatient Plan of Care  Goal: Plan of Care Review  Description: The Plan of Care Review/Shift note should be completed every shift.  The Outcome Evaluation is a brief statement about your assessment that the patient is improving, declining, or no change.  This information will be displayed automatically on your shift  note.  Outcome: Progressing  Flowsheets (Taken 7/2/2025 1435)  Outcome Evaluation: ERCP done today. Pt. is sleepy and somnolence after ERCP. VSS. on CAPNO. Pt's son is at bedside.  Plan of Care Reviewed With: patient  Overall Patient Progress: no change  Goal: Patient-Specific Goal (Individualized)  Description: You can add care plan individualizations to a care plan. Examples of Individualization might be:  \"Parent requests to be called daily at 9am for status\", \"I have a hard time hearing out of my right ear\", or \"Do not touch me to wake me up as it startles  me\".  Outcome: Progressing  Goal: Absence of Hospital-Acquired Illness or Injury  Outcome: Progressing  Intervention: Identify and Manage Fall Risk  Recent Flowsheet Documentation  Taken 7/2/2025 0800 by Perico Meléndez, RN  Safety Promotion/Fall Prevention:   clutter free environment maintained   nonskid shoes/slippers when out of bed  Intervention: Prevent and Manage VTE (Venous Thromboembolism) Risk  Recent Flowsheet Documentation  Taken 7/2/2025 0800 by Perico Meléndez, RN  VTE " Prevention/Management: patient refused intervention  Intervention: Prevent Infection  Recent Flowsheet Documentation  Taken 7/2/2025 0800 by Perico Meléndez RN  Infection Prevention:   rest/sleep promoted   hand hygiene promoted  Goal: Optimal Comfort and Wellbeing  Outcome: Progressing  Intervention: Monitor Pain and Promote Comfort  Recent Flowsheet Documentation  Taken 7/2/2025 0900 by Perico Meléndez RN  Pain Management Interventions: pain management plan reviewed with patient/caregiver  Goal: Readiness for Transition of Care  Outcome: Progressing     Problem: Fall Injury Risk  Goal: Absence of Fall and Fall-Related Injury  Outcome: Progressing  Intervention: Identify and Manage Contributors  Recent Flowsheet Documentation  Taken 7/2/2025 0800 by Perico Meléndez RN  Medication Review/Management: medications reviewed  Intervention: Promote Injury-Free Environment  Recent Flowsheet Documentation  Taken 7/2/2025 0800 by Perico Meléndez RN  Safety Promotion/Fall Prevention:   clutter free environment maintained   nonskid shoes/slippers when out of bed     Problem: Acute Kidney Injury/Impairment  Goal: Fluid and Electrolyte Balance  Outcome: Progressing  Goal: Improved Oral Intake  Outcome: Progressing  Goal: Effective Renal Function  Outcome: Progressing  Intervention: Monitor and Support Renal Function  Recent Flowsheet Documentation  Taken 7/2/2025 0800 by Perico Meléndez RN  Medication Review/Management: medications reviewed     Problem: Liver Failure  Goal: Optimal Coping with Liver Failure  Outcome: Progressing  Goal: Absence of Bleeding  Outcome: Progressing  Goal: Fluid and Electrolyte Balance  Outcome: Progressing  Goal: Optimal Gastrointestinal Function  Outcome: Progressing  Intervention: Monitor and Support Gastrointestinal Function  Recent Flowsheet Documentation  Taken 7/2/2025 0900 by Perico Meléndez RN  Oral Care: patient refused intervention  Perineal  Care: perineum cleansed  Goal: Blood Glucose Level Within Target Range  Outcome: Progressing  Goal: Hemodynamic Stability  Outcome: Progressing  Goal: Absence of Infection Signs and Symptoms  Outcome: Progressing  Intervention: Prevent or Manage Infection  Recent Flowsheet Documentation  Taken 7/2/2025 0800 by Perico Meléndez RN  Infection Management: aseptic technique maintained  Goal: Optimal Neurologic Function  Outcome: Progressing  Goal: Improved Oral Intake  Outcome: Progressing  Goal: Optimal Pain Control, Comfort and Function  Outcome: Progressing  Intervention: Prevent or Manage Pain  Recent Flowsheet Documentation  Taken 7/2/2025 0900 by Perico Meléndez RN  Pain Management Interventions: pain management plan reviewed with patient/caregiver  Goal: Effective Oxygenation and Ventilation  Outcome: Progressing  Intervention: Promote Airway Secretion Clearance  Recent Flowsheet Documentation  Taken 7/2/2025 0800 by Perico Meléndez RN  Cough And Deep Breathing: done independently per patient

## 2025-07-02 NOTE — PROGRESS NOTES
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    - Biopsy showed adenocarcinoma, c/w cholangiocarcinoma  - Liver function tests have not improved at all since stent placement.     PLAN:  - Repeat ERCP planned today  - Will order outpatient PET scan  - His disease is not considered curable but treatable, and the goal of treatment is to help him live longer with better quality of life.  - Standard first line treatment is gemcitabine/cisplatin/pembrolizumab, but would need to wait for his liver function tests to get better first  - We found out the patient is uninsured.  I would like him to work with  in the hospital to see if there's any temporary insurance could be set up for him.  If not and he remains uninsured, would have him see [facility] after discharge ([facility] may have some financial assistance programs for uninsured patients)     Paul Roberto M.D.  [facility]  [phone]    Subjective:    Patient feels about the same today.  Still not getting out of bed much.  No nausea or vomiting.  Appetite is OK.  Currently NPO for ERCP      Labs:  All labs reviewed    CBC  Recent Labs   Lab 07/02/25  0912 07/01/25  0723 06/30/25  0712   WBC 16.0* 18.0* 17.7*   HGB 10.1* 10.8* 10.7*   MCV 96 96 94    271 241       CMP  Recent Labs   Lab 07/02/25  1338 07/02/25  0932 07/02/25  0912 07/01/25  1548 07/01/25  0723 06/30/25  0712 06/29/25  1748 06/29/25  1054   NA  --   --  147* 149* 147* 146*  --  143   POTASSIUM  --   --  2.9* 3.5 3.6 3.7   < > 3.3*   CHLORIDE  --   --  114* 115* 115* 114*  --  112*   CO2  --   --  21* 22 21* 22  --  22   ANIONGAP  --   --  12 12 11 10  --  9   * 109* 117* 120* 119* 109*  --  99   BUN  --   --  50.0*  "54.4* 53.9* 41.1*  --  37.7*   CR  --   --  1.63* 1.84* 1.89* 1.63*  --  1.48*   GFRESTIMATED  --   --  48* 41* 40* 48*  --  53*   SANDRA  --   --  11.5* 11.4* 11.3* 10.9*  --  10.6*   MAG  --   --  2.6*  --  2.6* 2.4*  --  2.3   PHOS  --   --  2.4*  --  3.6 3.0  --  2.5   PROTTOTAL  --   --  4.5*  --  4.6* 4.4*  --  4.2*   ALBUMIN  --   --  2.8*  --  2.4* 2.2*  --  2.2*   BILITOTAL  --   --  31.0*  --  27.2* 26.7*  --  26.3*   ALKPHOS  --   --  351*  --  411* 398*  --  390*   AST  --   --  112*  --  153* 173*  --  230*   ALT  --   --  166*  --  205* 207*  --  216*    < > = values in this interval not displayed.       INR  Recent Labs   Lab 06/26/25  0709   INR 1.14       Blood CultureNo results for input(s): \"CULT\" in the last 168 hours.      Paul Roberto MD  [facility]  7/2/2025 5:19 PM        "

## 2025-07-02 NOTE — PLAN OF CARE
"Pt alert, sleeping most of the time during the shift, refused bed alarm later after agreeing together we keep it, said he is not weak. PIV -SL. Was NPO since midnight for repeat ERCP. Denied pain.   Problem: Adult Inpatient Plan of Care  Goal: Plan of Care Review  Description: The Plan of Care Review/Shift note should be completed every shift.  The Outcome Evaluation is a brief statement about your assessment that the patient is improving, declining, or no change.  This information will be displayed automatically on your shift  note.  Outcome: Progressing  Flowsheets (Taken 7/2/2025 0647)  Outcome Evaluation: NPO from midnight for repeat ERCP today  Plan of Care Reviewed With: patient  Overall Patient Progress: improving  Goal: Patient-Specific Goal (Individualized)  Description: You can add care plan individualizations to a care plan. Examples of Individualization might be:  \"Parent requests to be called daily at 9am for status\", \"I have a hard time hearing out of my right ear\", or \"Do not touch me to wake me up as it startles  me\".  Outcome: Progressing  Goal: Absence of Hospital-Acquired Illness or Injury  Outcome: Progressing  Intervention: Identify and Manage Fall Risk  Recent Flowsheet Documentation  Taken 7/2/2025 0337 by Theresa Matthews, ALEX  Safety Promotion/Fall Prevention:   clutter free environment maintained   nonskid shoes/slippers when out of bed  Taken 7/1/2025 2017 by Theresa Matthews RN  Safety Promotion/Fall Prevention:   clutter free environment maintained   nonskid shoes/slippers when out of bed  Intervention: Prevent Skin Injury  Recent Flowsheet Documentation  Taken 7/1/2025 2017 by Theresa Matthews, RN  Body Position: position changed independently  Taken 7/1/2025 2014 by Theresa Matthews, RN  Body Position: position changed independently  Intervention: Prevent Infection  Recent Flowsheet Documentation  Taken 7/1/2025 2017 by Theresa Matthews, RN  Infection Prevention:   rest/sleep promoted   hand hygiene " promoted  Goal: Optimal Comfort and Wellbeing  Outcome: Progressing  Goal: Readiness for Transition of Care  Outcome: Progressing     Problem: Fall Injury Risk  Goal: Absence of Fall and Fall-Related Injury  Outcome: Progressing  Intervention: Identify and Manage Contributors  Recent Flowsheet Documentation  Taken 7/2/2025 0337 by Theresa Matthews RN  Medication Review/Management: medications reviewed  Taken 7/1/2025 2017 by Theresa Matthews RN  Medication Review/Management: medications reviewed  Intervention: Promote Injury-Free Environment  Recent Flowsheet Documentation  Taken 7/2/2025 0337 by Theresa Matthews RN  Safety Promotion/Fall Prevention:   clutter free environment maintained   nonskid shoes/slippers when out of bed  Taken 7/1/2025 2017 by Theresa Matthews RN  Safety Promotion/Fall Prevention:   clutter free environment maintained   nonskid shoes/slippers when out of bed     Problem: Acute Kidney Injury/Impairment  Goal: Fluid and Electrolyte Balance  Outcome: Progressing  Goal: Improved Oral Intake  Outcome: Progressing  Goal: Effective Renal Function  Outcome: Progressing  Intervention: Monitor and Support Renal Function  Recent Flowsheet Documentation  Taken 7/2/2025 0337 by Theresa Matthews RN  Medication Review/Management: medications reviewed  Taken 7/1/2025 2017 by Theresa Matthews RN  Medication Review/Management: medications reviewed     Problem: Liver Failure  Goal: Optimal Coping with Liver Failure  Outcome: Progressing  Goal: Absence of Bleeding  Outcome: Progressing  Goal: Fluid and Electrolyte Balance  Outcome: Progressing  Goal: Optimal Gastrointestinal Function  Outcome: Progressing  Goal: Blood Glucose Level Within Target Range  Outcome: Progressing  Goal: Hemodynamic Stability  Outcome: Progressing  Goal: Absence of Infection Signs and Symptoms  Outcome: Progressing  Goal: Optimal Neurologic Function  Outcome: Progressing  Intervention: Monitor and Optimize Neurologic Status  Recent Flowsheet  Documentation  Taken 7/1/2025 2014 by Theresa Matthews RN  Head of Bed (HOB) Positioning: HOB at 20-30 degrees  Goal: Improved Oral Intake  Outcome: Progressing  Goal: Optimal Pain Control, Comfort and Function  Outcome: Progressing  Goal: Effective Oxygenation and Ventilation  Outcome: Progressing  Intervention: Promote Airway Secretion Clearance  Recent Flowsheet Documentation  Taken 7/2/2025 0337 by Theresa Matthews, RN  Activity Management:   ambulated to bathroom   back to bed   activity adjusted per tolerance  Taken 7/1/2025 2017 by Theresa Matthews RN  Activity Management: activity adjusted per tolerance  Taken 7/1/2025 2014 by Theresa Matthews RN  Activity Management: activity adjusted per tolerance  Intervention: Optimize Oxygenation and Ventilation  Recent Flowsheet Documentation  Taken 7/1/2025 2014 by Theresa Matthews, RN  Head of Bed (HOB) Positioning: HOB at 20-30 degrees   Goal Outcome Evaluation:      Plan of Care Reviewed With: patient    Overall Patient Progress: improvingOverall Patient Progress: improving    Outcome Evaluation: NPO from midnight for repeat ERCP today

## 2025-07-02 NOTE — ANESTHESIA PREPROCEDURE EVALUATION
Anesthesia Pre-Procedure Evaluation    Patient: Zoltan Dalton   MRN: [id number] : 1965          Procedure : Procedure(s):  ENDOSCOPIC RETROGRADE CHOLANGIOPANCREATOGRAPHY         History reviewed. No pertinent past medical history.   Past Surgical History:   Procedure Laterality Date    COLONOSCOPY      COLONOSCOPY N/A 2021    Procedure: COLONOSCOPY (fv);  Surgeon: Ambrose Vargas MD;  Location: [facility]    COLONOSCOPY N/A 2025    Procedure: Colonoscopy;  Surgeon: Ramon Colbert MD;  Location: [facility]    ENDOSCOPIC RETROGRADE CHOLANGIOPANCREATOGRAM N/A 2025    Procedure: Endoscopic retrograde cholangiopancreatogram, sphincterotomy with stent placement x2;  Surgeon: Ramon Colbert MD;  Location: [facility]    ESOPHAGOSCOPY, GASTROSCOPY, DUODENOSCOPY (EGD), COMBINED N/A 2025    Procedure: ESOPHAGOGASTRODUODENOSCOPY, WITH FINE NEEDLE ASPIRATION BIOPSY, ENDOSCOPIC ULTRASOUND GUIDANCE;  Surgeon: Ramon Colbert MD;  Location: [facility]      No Known Allergies   Social History     Tobacco Use    Smoking status: Former     Current packs/day: 0.00     Average packs/day: 1 pack/day for 41.0 years (41.0 ttl pk-yrs)     Types: Cigarettes     Start date:      Quit date: 2020     Years since quittin.5    Smokeless tobacco: Never   Substance Use Topics    Alcohol use: Yes     Comment: 6 per week      Wt Readings from Last 1 Encounters:   25 56.4 kg (124 lb 4.8 oz)        Anesthesia Evaluation            ROS/MED HX  ENT/Pulmonary:  - neg pulmonary ROS     Neurologic:  - neg neurologic ROS     Cardiovascular:  - neg cardiovascular ROS     METS/Exercise Tolerance:     Hematologic:     (+)      anemia,          Musculoskeletal:  - neg musculoskeletal ROS     GI/Hepatic: Comment: Possible liver cancer with mets    (+)             liver disease,       Renal/Genitourinary:     (+) renal disease, type: ARF,            Endo:       Psychiatric/Substance Use:  - neg  "psychiatric ROS     Infectious Disease:       Malignancy:   (+) Malignancy, History of GI.GI CA  Active status post.      Other:              Physical Exam  Airway  Mallampati: II  TM distance: >3 FB  Neck ROM: full  Mouth opening: >= 4 cm    Cardiovascular   Rhythm: regular  Rate: normal rate     Dental   (+) Modest Abnormalities - crowns, retainers, 1 or 2 missing teeth      Pulmonary - normal exam      Neurological - normal exam  He appears awake, alert and oriented x3.    Other Findings       OUTSIDE LABS:  CBC:   Lab Results   Component Value Date    WBC 16.0 (H) 07/02/2025    WBC 18.0 (H) 07/01/2025    HGB 10.1 (L) 07/02/2025    HGB 10.8 (L) 07/01/2025    HCT 28.0 (L) 07/02/2025    HCT 30.1 (L) 07/01/2025     07/02/2025     07/01/2025     BMP:   Lab Results   Component Value Date     (H) 07/01/2025     (H) 07/01/2025    POTASSIUM 3.5 07/01/2025    POTASSIUM 3.6 07/01/2025    CHLORIDE 115 (H) 07/01/2025    CHLORIDE 115 (H) 07/01/2025    CO2 22 07/01/2025    CO2 21 (L) 07/01/2025    BUN 54.4 (H) 07/01/2025    BUN 53.9 (H) 07/01/2025    CR 1.84 (H) 07/01/2025    CR 1.89 (H) 07/01/2025     (H) 07/01/2025     (H) 07/01/2025     COAGS:   Lab Results   Component Value Date    PTT 26 06/24/2025    INR 1.14 06/26/2025     POC: No results found for: \"BGM\", \"HCG\", \"HCGS\"  HEPATIC:   Lab Results   Component Value Date    ALBUMIN 2.4 (L) 07/01/2025    PROTTOTAL 4.6 (L) 07/01/2025     (H) 07/01/2025     (H) 07/01/2025    ALKPHOS 411 (H) 07/01/2025    BILITOTAL 27.2 (HH) 07/01/2025     OTHER:   Lab Results   Component Value Date    LACT 1.0 06/24/2025    A1C 4.3 06/24/2025    SANDRA 11.4 (H) 07/01/2025    PHOS 3.6 07/01/2025    MAG 2.6 (H) 07/01/2025    LIPASE 31 06/24/2025    TSH  06/24/2025      Comment:      Unsatisfactory specimen - icteric.           Anesthesia Plan    ASA Status:  3      NPO Status: NPO Appropriate   Anesthesia Type: MAC.  Airway: natural " airway.  Induction: intravenous.  Maintenance: TIVA.   Techniques and Equipment:       - Monitoring Plan: standard ASA monitoring     Consents    Anesthesia Plan(s) and associated risks, benefits, and realistic alternatives discussed. Questions answered and patient/representative(s) expressed understanding.     - Discussed: anesthesiologist     - Discussed with:  Patient        - Pt is DNR/DNI Status: no DNR     Blood Consent:      - Discussed with: not discussed.     Postoperative Care    Pain management: non-narcotic analgesics, plan for postoperative opioid use, multimodal analgesia.     Comments:                   Kaushik Esparza MD        Clinically Significant Risk Factors         # Hypernatremia: Highest Na = 149 mmol/L in last 2 days, will monitor as appropriate  # Hyperchloremia: Highest Cl = 115 mmol/L in last 2 days, will monitor as appropriate       # Hypercalcemia: Highest Ca = 11.4 mg/dL in last 2 days, will monitor as appropriate    # Hypoalbuminemia: Lowest albumin = 2.2 g/dL at 6/30/2025  7:12 AM, will monitor as appropriate                 # Severe Malnutrition: based on nutrition assessment and treatment provided per dietitian's recommendations.

## 2025-07-03 VITALS
RESPIRATION RATE: 18 BRPM | BODY MASS INDEX: 18.41 KG/M2 | HEIGHT: 68 IN | HEART RATE: 79 BPM | DIASTOLIC BLOOD PRESSURE: 68 MMHG | OXYGEN SATURATION: 95 % | TEMPERATURE: 98.4 F | WEIGHT: 121.47 LBS | SYSTOLIC BLOOD PRESSURE: 139 MMHG

## 2025-07-03 LAB
ALBUMIN SERPL BCG-MCNC: 2.9 G/DL (ref 3.5–5.2)
ALP SERPL-CCNC: 360 U/L (ref 40–150)
ALT SERPL W P-5'-P-CCNC: 188 U/L (ref 0–70)
ANION GAP SERPL CALCULATED.3IONS-SCNC: 11 MMOL/L (ref 7–15)
AST SERPL W P-5'-P-CCNC: 138 U/L (ref 0–45)
BILIRUB DIRECT SERPL-MCNC: 21.26 MG/DL (ref 0–0.3)
BILIRUB SERPL-MCNC: 28.8 MG/DL
BUN SERPL-MCNC: 47.3 MG/DL (ref 8–23)
CALCIUM SERPL-MCNC: 11.3 MG/DL (ref 8.8–10.4)
CHLORIDE SERPL-SCNC: 116 MMOL/L (ref 98–107)
CREAT SERPL-MCNC: 1.66 MG/DL (ref 0.67–1.17)
EGFRCR SERPLBLD CKD-EPI 2021: 47 ML/MIN/1.73M2
ERYTHROCYTE [DISTWIDTH] IN BLOOD BY AUTOMATED COUNT: 25.5 % (ref 10–15)
GLUCOSE BLDC GLUCOMTR-MCNC: 113 MG/DL (ref 70–99)
GLUCOSE BLDC GLUCOMTR-MCNC: 84 MG/DL (ref 70–99)
GLUCOSE SERPL-MCNC: 115 MG/DL (ref 70–99)
HCO3 SERPL-SCNC: 22 MMOL/L (ref 22–29)
HCT VFR BLD AUTO: 28.6 % (ref 40–53)
HGB BLD-MCNC: 10.3 G/DL (ref 13.3–17.7)
LACTATE SERPL-SCNC: 1.6 MMOL/L (ref 0.7–2)
MAGNESIUM SERPL-MCNC: 2.6 MG/DL (ref 1.7–2.3)
MCH RBC QN AUTO: 35.2 PG (ref 26.5–33)
MCHC RBC AUTO-ENTMCNC: 36 G/DL (ref 31.5–36.5)
MCV RBC AUTO: 98 FL (ref 78–100)
PHOSPHATE SERPL-MCNC: 2.9 MG/DL (ref 2.5–4.5)
PLATELET # BLD AUTO: 220 10E3/UL (ref 150–450)
POTASSIUM SERPL-SCNC: 3.6 MMOL/L (ref 3.4–5.3)
PROT SERPL-MCNC: 4.4 G/DL (ref 6.4–8.3)
RBC # BLD AUTO: 2.93 10E6/UL (ref 4.4–5.9)
SODIUM SERPL-SCNC: 149 MMOL/L (ref 135–145)
WBC # BLD AUTO: 19.5 10E3/UL (ref 4–11)

## 2025-07-03 PROCEDURE — 120N000001 HC R&B MED SURG/OB

## 2025-07-03 PROCEDURE — 84100 ASSAY OF PHOSPHORUS: CPT | Performed by: INTERNAL MEDICINE

## 2025-07-03 PROCEDURE — 83735 ASSAY OF MAGNESIUM: CPT | Performed by: INTERNAL MEDICINE

## 2025-07-03 PROCEDURE — 99232 SBSQ HOSP IP/OBS MODERATE 35: CPT | Performed by: INTERNAL MEDICINE

## 2025-07-03 PROCEDURE — 82248 BILIRUBIN DIRECT: CPT | Performed by: INTERNAL MEDICINE

## 2025-07-03 PROCEDURE — 83605 ASSAY OF LACTIC ACID: CPT | Performed by: INTERNAL MEDICINE

## 2025-07-03 PROCEDURE — 250N000013 HC RX MED GY IP 250 OP 250 PS 637: Performed by: STUDENT IN AN ORGANIZED HEALTH CARE EDUCATION/TRAINING PROGRAM

## 2025-07-03 PROCEDURE — 80053 COMPREHEN METABOLIC PANEL: CPT | Performed by: STUDENT IN AN ORGANIZED HEALTH CARE EDUCATION/TRAINING PROGRAM

## 2025-07-03 PROCEDURE — 36415 COLL VENOUS BLD VENIPUNCTURE: CPT | Performed by: INTERNAL MEDICINE

## 2025-07-03 PROCEDURE — 36415 COLL VENOUS BLD VENIPUNCTURE: CPT | Performed by: STUDENT IN AN ORGANIZED HEALTH CARE EDUCATION/TRAINING PROGRAM

## 2025-07-03 PROCEDURE — 85027 COMPLETE CBC AUTOMATED: CPT | Performed by: STUDENT IN AN ORGANIZED HEALTH CARE EDUCATION/TRAINING PROGRAM

## 2025-07-03 PROCEDURE — 99254 IP/OBS CNSLTJ NEW/EST MOD 60: CPT | Performed by: NURSE PRACTITIONER

## 2025-07-03 RX ADMIN — MIRTAZAPINE 15 MG: 15 TABLET, FILM COATED ORAL at 21:13

## 2025-07-03 ASSESSMENT — ACTIVITIES OF DAILY LIVING (ADL)
ADLS_ACUITY_SCORE: 45
ADLS_ACUITY_SCORE: 44

## 2025-07-03 NOTE — PLAN OF CARE
"Alert to self, place and sometimes to situation. Intermittent confusion. Vitals stable. Lethargic and somnolent since the procedure. Afebrile. MD notified. Family at bedside. Up with assist of 1 to bathroom.  Potassium replacement competed. Clear liquid diet. Refused to eat. Denied pain at the time of assessment. Continue POC and monitoring.        Problem: Adult Inpatient Plan of Care  Goal: Plan of Care Review  Description: The Plan of Care Review/Shift note should be completed every shift.  The Outcome Evaluation is a brief statement about your assessment that the patient is improving, declining, or no change.  This information will be displayed automatically on your shift  note.  Outcome: Progressing  Flowsheets (Taken 7/2/2025 2122)  Outcome Evaluation: Patient continued to be lethargic and somnolent following the procedure.  Plan of Care Reviewed With:   patient   family  Overall Patient Progress: no change  Goal: Patient-Specific Goal (Individualized)  Description: You can add care plan individualizations to a care plan. Examples of Individualization might be:  \"Parent requests to be called daily at 9am for status\", \"I have a hard time hearing out of my right ear\", or \"Do not touch me to wake me up as it startles  me\".  Outcome: Progressing  Goal: Absence of Hospital-Acquired Illness or Injury  Outcome: Progressing  Goal: Optimal Comfort and Wellbeing  Outcome: Progressing  Goal: Readiness for Transition of Care  Outcome: Progressing     Problem: Fall Injury Risk  Goal: Absence of Fall and Fall-Related Injury  Outcome: Progressing     Problem: Acute Kidney Injury/Impairment  Goal: Fluid and Electrolyte Balance  Outcome: Progressing  Goal: Improved Oral Intake  Outcome: Progressing  Goal: Effective Renal Function  Outcome: Progressing     Problem: Liver Failure  Goal: Optimal Coping with Liver Failure  Outcome: Progressing  Goal: Absence of Bleeding  Outcome: Progressing  Goal: Fluid and Electrolyte " Balance  Outcome: Progressing  Goal: Optimal Gastrointestinal Function  Outcome: Progressing  Goal: Blood Glucose Level Within Target Range  Outcome: Progressing  Goal: Hemodynamic Stability  Outcome: Progressing  Goal: Absence of Infection Signs and Symptoms  Outcome: Progressing  Goal: Optimal Neurologic Function  Outcome: Progressing  Goal: Improved Oral Intake  Outcome: Progressing  Goal: Optimal Pain Control, Comfort and Function  Outcome: Progressing  Goal: Effective Oxygenation and Ventilation  Outcome: Progressing  Intervention: Promote Airway Secretion Clearance  Recent Flowsheet Documentation  Taken 7/2/2025 0758 by Shanell Espinoza RN  Cough And Deep Breathing: done independently per patient   Goal Outcome Evaluation:      Plan of Care Reviewed With: patient, family    Overall Patient Progress: no changeOverall Patient Progress: no change    Outcome Evaluation: Patient continued to be lethargic and somnolent following the procedure.

## 2025-07-03 NOTE — PROGRESS NOTES
"GASTROENTEROLOGY PROGRESS NOTE     SUBJECTIVE:  Resting in bed. No acute concerns. No abdominal pain.      OBJECTIVE:  /56 (BP Location: Right arm)   Pulse 76   Temp 97.8  F (36.6  C) (Oral)   Resp 18   Ht 1.727 m (5' 8\")   Wt 55.1 kg (121 lb 7.6 oz)   SpO2 96%   BMI 18.47 kg/m    Temp (24hrs), Av.6  F (36.4  C), Min:97.2  F (36.2  C), Max:98.2  F (36.8  C)    Patient Vitals for the past 72 hrs:   Weight   25 0737 55.1 kg (121 lb 7.6 oz)       Intake/Output Summary (Last 24 hours) at 7/3/2025 0839  Last data filed at 2025 1254  Gross per 24 hour   Intake 1250 ml   Output 0 ml   Net 1250 ml        PHYSICAL EXAM  GENERAL: No obvious distress  EYES: No scleral icterus  ABDOMEN: Non-distended. Soft. Non-tender.  SKIN: No jaundice  NEUROLOGIC: Alert and oriented. No asterixis.   PSYCHIATRIC: Normal affect     Additional Comments:  ROS, FH, SH: See initial GI consult for details.     I have reviewed the patient's new clinical lab results:     Recent Labs   Lab Test 25  0706 25  0912 25  0723 25  0647 25  0709 25  0711 25  0642   WBC 19.5* 16.0* 18.0*   < > 12.8*   < > 14.5*   HGB 10.3* 10.1* 10.8*   < > 10.2*   < > 11.3*   MCV 98 96 96   < > 91   < > 91    252 271   < > 284   < > 277   INR  --   --   --   --  1.14  --  1.09    < > = values in this interval not displayed.     Recent Labs   Lab Test 25  0725  0912 25  1548   POTASSIUM 3.6 4.2 2.9* 3.5   CHLORIDE 116*  --  114* 115*   CO2 22  --  21* 22   BUN 47.3*  --  50.0* 54.4*   ANIONGAP 11  --  12 12     Recent Labs   Lab Test 25  0706 25  0912 25  0723 25  0711 25  0833 25  0642   ALBUMIN 2.9* 2.8* 2.4*   < >  --  3.0*   BILITOTAL 28.8* 31.0* 27.2*   < >  --  33.4*   * 166* 205*   < >  --  140*   * 112* 153*   < >  --  135*   PROTEIN  --   --   --   --  10*  --    LIPASE  --   --   --   --   --  31    < > = " values in this interval not displayed.       IMAGING:   CT CAP w/o contrast 6/24/25  IMPRESSION:  1.  Findings worrisome for malignancy within the liver. The liver appears heterogeneous with suggestion of underlying hepatic masses primarily at the right liver. There is severe left worse than right intrahepatic biliary ductal dilatation with cutoff of the biliary tree at the central liver. Recommend correlation with hepatic MRI and MRCP.  2.  Indeterminate focal region of wall thickening and decompression at the mid ascending colon. This should be considered a colonic mass until proven otherwise. Recommend correlation with colonoscopy.  3.  Several indeterminate pulmonary nodules bilaterally. Metastatic disease is possible. This could also relate to an atypical infectious or inflammatory etiology. Recommend follow-up short interval CT chest in 3 months.  4.  Small ascites. A few borderline prominent upper abdominal lymph nodes are suggested but these are limited in view.  5.  Technically indeterminate small sclerosis at the left ischial tuberosity.  6.  Mild anasarca.     ENDOSCOPIC EVALUATION:  EUS 6/26/2025  Impression:         - Innumerable hepatic masses with obstruction of                             the bile duct at the hilum. Fine-needle aspiration                             as above.      ERCP 6/26/2025  Impression:                - Biliary obstruction at the hilum secondary to masses seen on EUS. 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  Sphincterotomy, stenting as above.     ERCP 7/2/2025  Findings:       A biliary stent was visible on the  film. The stent was protruding        from the papilla. This was removed with a snare. The bile duct was        cannulated with a retrieval balloon and a wire. The wire was advanced        into the left intrahepatic system. Injection revealed significant        dilation of the left intrahepatic  biliary system. As we pulled through        there was a 3 cm stricture in the area of the bifurcation. The common        hepatic duct appeared normal. A 4 cm x 6 mm dilating balloon was used to        dilate the stricture. A 15 cm x 10 English plastic stent was placed        across the stricture into the left intrahepatic ductal system.                                                                                    Impression:               - Biliary stent exchange. 15 cm x 10 English plastic                             stent was placed into the dilated segment of the                             left intrahepatics. Previously placed biliary stent                             was removed.      ASSESSMENT:  60 year old who is admitted with obstructive jaundice likely due to malignancy.      # Obstructive jaundice  # Biliary mass  # S/p ERCP with sphincterotomy and plastic stents 6/24/25   Bilirubin has not decreased much since ERCP. GI called back for consideration of repeat ERCP as stents might not be draining adequately.     Underwent ERCP 7/2/2025 with biliary stent exchange. Total bilirubin this morning 28.8.      PLAN:   - Follow bilirubin levels  - If bilirubin does not improve significantly, will need to consider PTC  - GI will sign off. Please contact us with any questions or concerns.     Discussed patient findings and plan with Dr. Rosas.     Total time: 25 minutes of total time was spent providing patient care, including patient evaluation, reviewing documentation/test results, and .     Hannah Mandujano PA-C  [facility] ([facility])

## 2025-07-03 NOTE — PLAN OF CARE
"Shift Summary 2300 - 0700  Vital signs:  Temp: 97.5  F (36.4  C) Temp src: Oral BP: 110/57 Pulse: 65   Resp: 18 SpO2: 98 % O2 Device: Nasal cannula Oxygen Delivery: 2 LPM     Pt alert and oriented to self and place. Has intermittent confusion, forgetful; somnolent and lethargic, up w assist of 1 w GB and walker. On 2LPM O2 via NC, on Capno post ERCP. Denies pain. On clear liquid diet to be advanced as tolerated. Reports poor appetite,not eating and drinking adequately. Blood sugars monitored - stable NOC. On K/Mg and Phos protocols - replaced, for AM checks.    Problem: Adult Inpatient Plan of Care  Goal: Plan of Care Review  Description: The Plan of Care Review/Shift note should be completed every shift.  The Outcome Evaluation is a brief statement about your assessment that the patient is improving, declining, or no change.  This information will be displayed automatically on your shift  note.  Outcome: Not Progressing  Flowsheets (Taken 7/3/2025 0336)  Outcome Evaluation: Pt lethargic and somnolent, Electrolye replacements done, due for AM draws. On 2LPM NC Capno post ERCP  Plan of Care Reviewed With: (son at bedside)   patient   child  Overall Patient Progress: no change  Goal: Patient-Specific Goal (Individualized)  Description: You can add care plan individualizations to a care plan. Examples of Individualization might be:  \"Parent requests to be called daily at 9am for status\", \"I have a hard time hearing out of my right ear\", or \"Do not touch me to wake me up as it startles  me\".  Outcome: Not Progressing  Goal: Absence of Hospital-Acquired Illness or Injury  Outcome: Not Progressing  Goal: Optimal Comfort and Wellbeing  Outcome: Not Progressing  Goal: Readiness for Transition of Care  Outcome: Not Progressing     Problem: Fall Injury Risk  Goal: Absence of Fall and Fall-Related Injury  Outcome: Not Progressing     Problem: Acute Kidney Injury/Impairment  Goal: Fluid and Electrolyte Balance  Outcome: Not " Progressing  Goal: Improved Oral Intake  Outcome: Not Progressing  Goal: Effective Renal Function  Outcome: Not Progressing     Problem: Liver Failure  Goal: Optimal Coping with Liver Failure  Outcome: Not Progressing  Goal: Absence of Bleeding  Outcome: Not Progressing  Goal: Fluid and Electrolyte Balance  Outcome: Not Progressing  Goal: Optimal Gastrointestinal Function  Outcome: Not Progressing  Goal: Blood Glucose Level Within Target Range  Outcome: Not Progressing  Goal: Hemodynamic Stability  Outcome: Not Progressing  Goal: Absence of Infection Signs and Symptoms  Outcome: Not Progressing  Goal: Optimal Neurologic Function  Outcome: Not Progressing  Goal: Improved Oral Intake  Outcome: Not Progressing  Goal: Optimal Pain Control, Comfort and Function  Outcome: Not Progressing  Goal: Effective Oxygenation and Ventilation  Outcome: Not Progressing       Goal Outcome Evaluation:      Plan of Care Reviewed With: patient, child (son at bedside)    Overall Patient Progress: no changeOverall Patient Progress: no change    Outcome Evaluation: Pt lethargic and somnolent, Electrolye replacements done, due for AM draws. On 3L NC Capno post ERCP

## 2025-07-03 NOTE — CONSULTS
Palliative Care Consultation Note  [facility]      Patient: Zoltan Dalton  Date of Admission:  6/24/2025    Requesting Clinician / Team: hospital medicine  Reason for consult: Goals of care  Patient and family support       Recommendations & Counseling     GOALS OF CARE:   Life-prolonging without limits   Patient notes that he would want things done to keep him alive at this point  Goal to follow up with Oncology to review work up findings and hear more about treatment options.  Surrogate decision maker noted by patient to be his son Zoltan Anne - patient states he would want Zoltan Jarvis to decide, with the medical team, when discontinuation of life-sustaining treatments should be sought.     ADVANCE CARE PLANNING:  No health care directive on file. Per system policy, Surrogate Decision-makers for Patients With Diminished Decision-making Capacity offers guidance on possible decision-makers. Zoltan Dalton Jr. has been identified as a surrogate decision maker.   There is no POLST form on file, defer to patient and/or next of kin for decisions   Code status: Full Code    MEDICAL MANAGEMENT:   We are not actively managing symptoms at this time.    PSYCHOSOCIAL/SPIRITUAL SUPPORT:  Family : son     Palliative Care will continue to follow. Thank you for the consult and allowing us to aid in the care of Zoltan Dalton.    These recommendations have been discussed with patient, nursing staff, primary team.    GUIHLERME Felipe CNP  [facility], Palliative Care  Securely message with the [employer] Web Console (learn more here) or  Text page via [facility] Paging/Directory         Assessment      Zoltan Dalton is a 60 year old male with a past medical history of tobaco use who presented on 6/24/25 with multiple days of abdominal pain.  Associated symptoms of early satiety, 40lb weight loss, epigastric pain/reflux.    Patient does not follow with primary care provider at baseline, no known medical  history.      Imaging obtained for pain evaluation, found to have lesions consistent with hepatic malignancy.   Liver enzymes elevated, acute kidney injury, colonic mass, intrahepatic biliary ductal dilatation, pulmonary nodules all appreciate on work up.  FNA obtained for tumor work up and oncology consulted.  Recommend follow up as OP with results of tissue sample and further care planning.  GI consulted ERCP completed with biliary stenting.  [place]-rectal surgery consulted, do not recommend surgical intervention at current time.     Today, the patient was seen for:  Goals of care  Patient support    History of Present Illness   Met with Zoltan at the bedside, he is lying in bed and alert.   I introduced our role as an extra layer of support and how we help patients and families dealing with serious, potentially life-limiting illnesses. I explained the composition of the palliative care team.  Palliative care helps patients and families navigate their care while focusing on the whole person; providing emotional, social and spiritual support  Palliative care often assists with symptom management, information sharing about what to expect from the illness, available treatment options and what effect those options may have on the disease course, and provide effective communication and caring support.    Zoltan reviewed his understanding of his new diagnosis, acknowledged that he is overwhelmed with all of the medical information.  Frequent deferrals to his son Zoltan Anne     Prognosis, Goals, & Planning:   Functional Status just prior to this current hospitalization:  ECOG1 (Restricted in physically strenuous activity but ambulatory and able to carry out work of a light or sedentary nature)    Prognosis, Goals, and/or Advance Care Planning:  We discussed general treatment options (full/restorative, selective/conservatives, and comfort only/hospice). We then discussed how these specifically apply to Zoltan.  Based on  this discussion, he has decided to continue with full/restorative cares.  Discussed what continuing restorative/life-prolonging care entails, including continued (re)admissions to the hospital, continuing with preventative and primary care, seeing disease/organ specific specialty consultations for medical treatments in hopes to prolong life for as long as possible.      Code Status was addressed today:   Yes, We discussed potential risks and rationale of attempting cardiac resuscitation, intubation, and mechanical ventilation.  We also discussed probability of survival as well as quality of life implications.  Based on this discussion, patient or surrogate response/decision: FULL CODE      Patient's decision making preferences: shared with support from loved ones        Patient has decision-making capacity today for complex decisions: Intact           Coping, Meaning, & Spirituality:   Mood, coping, and/or meaning in the context of serious illness were addressed today: Yes    Social:   Living situation: lives alone  Important relationships/caregivers: son Zoltan    Medications:  Reviewed this patient's medication profile and medications from this hospitalization. [place] Board of Pharmacy Data Base Reviewed: Yes:   reviewed - no record of controlled substances prescribed..    ROS:  Comprehensive ROS is reviewed and is negative except as here & per HPI:     Physical Exam   Vital Signs with Ranges  Temp:  [97.4  F (36.3  C)-97.8  F (36.6  C)] 97.4  F (36.3  C)  Pulse:  [65-82] 75  Resp:  [16-21] 16  BP: (109-128)/(44-88) 128/44  SpO2:  [96 %-98 %] 96 %  Wt Readings from Last 10 Encounters:   07/02/25 55.1 kg (121 lb 7.6 oz)   04/06/21 83.9 kg (185 lb)   03/04/21 85.3 kg (188 lb)     121 lbs 7.58 oz    PHYSICAL EXAM:  Constitutional: lying in bed, alert and no distress   Cardiovascular: negative  Respiratory: negative  Abdomen: Abdomen soft, non-tender.  NEURO: no focal deficits    Data reviewed:  Recent Results  (from the past 24 hours)   Potassium   Result Value Ref Range    Potassium 4.2 3.4 - 5.3 mmol/L   Glucose by meter   Result Value Ref Range    GLUCOSE BY METER POCT 102 (H) 70 - 99 mg/dL   Glucose by meter   Result Value Ref Range    GLUCOSE BY METER POCT 113 (H) 70 - 99 mg/dL   Glucose by meter   Result Value Ref Range    GLUCOSE BY METER POCT 84 70 - 99 mg/dL   Comprehensive metabolic panel   Result Value Ref Range    Sodium 149 (H) 135 - 145 mmol/L    Potassium 3.6 3.4 - 5.3 mmol/L    Carbon Dioxide (CO2) 22 22 - 29 mmol/L    Anion Gap 11 7 - 15 mmol/L    Urea Nitrogen 47.3 (H) 8.0 - 23.0 mg/dL    Creatinine 1.66 (H) 0.67 - 1.17 mg/dL    GFR Estimate 47 (L) >60 mL/min/1.73m2    Calcium 11.3 (H) 8.8 - 10.4 mg/dL    Chloride 116 (H) 98 - 107 mmol/L    Glucose 115 (H) 70 - 99 mg/dL    Alkaline Phosphatase 360 (H) 40 - 150 U/L     (H) 0 - 45 U/L     (H) 0 - 70 U/L    Protein Total 4.4 (L) 6.4 - 8.3 g/dL    Albumin 2.9 (L) 3.5 - 5.2 g/dL    Bilirubin Total 28.8 (HH) <=1.2 mg/dL   CBC with platelets   Result Value Ref Range    WBC Count 19.5 (H) 4.0 - 11.0 10e3/uL    RBC Count 2.93 (L) 4.40 - 5.90 10e6/uL    Hemoglobin 10.3 (L) 13.3 - 17.7 g/dL    Hematocrit 28.6 (L) 40.0 - 53.0 %    MCV 98 78 - 100 fL    MCH 35.2 (H) 26.5 - 33.0 pg    MCHC 36.0 31.5 - 36.5 g/dL    RDW 25.5 (H) 10.0 - 15.0 %    Platelet Count 220 150 - 450 10e3/uL   Phosphorus   Result Value Ref Range    Phosphorus 2.9 2.5 - 4.5 mg/dL   Magnesium   Result Value Ref Range    Magnesium 2.6 (H) 1.7 - 2.3 mg/dL   Bilirubin direct   Result Value Ref Range    Bilirubin Direct 21.26 (H) 0.00 - 0.30 mg/dL   Lactic Acid Whole Blood with 1X Repeat in 2 HR when >2   Result Value Ref Range    Lactic Acid, Initial 1.6 0.7 - 2.0 mmol/L       Medical Decision Making       70 MINUTES SPENT BY ME on the date of service doing chart review, history, exam, documentation & further activities per the note.

## 2025-07-03 NOTE — PLAN OF CARE
"Goal Outcome Evaluation:      Plan of Care Reviewed With: patient    Overall Patient Progress: no changeOverall Patient Progress: no change    Outcome Evaluation: Patient lethargic and somnolent most of the day. Denies SOB, on 1L NC. WBC elevated, provider notified. Palliative following. Plan: outpatient PET scan and follow up.       Problem: Adult Inpatient Plan of Care  Goal: Plan of Care Review  Description: The Plan of Care Review/Shift note should be completed every shift.  The Outcome Evaluation is a brief statement about your assessment that the patient is improving, declining, or no change.  This information will be displayed automatically on your shift  note.  Outcome: Progressing  Flowsheets (Taken 7/3/2025 1712)  Outcome Evaluation: Patient lethargic and somnolent most of the day. Denies SOB, on 1L NC. WBC elevated, provider notified. Palliative following. Plan: outpatient PET scan.  Plan of Care Reviewed With: patient  Overall Patient Progress: no change  Goal: Patient-Specific Goal (Individualized)  Description: You can add care plan individualizations to a care plan. Examples of Individualization might be:  \"Parent requests to be called daily at 9am for status\", \"I have a hard time hearing out of my right ear\", or \"Do not touch me to wake me up as it startles  me\".  Outcome: Progressing  Goal: Absence of Hospital-Acquired Illness or Injury  Outcome: Progressing  Intervention: Identify and Manage Fall Risk  Recent Flowsheet Documentation  Taken 7/3/2025 0836 by Jenna Ontiveros RN  Safety Promotion/Fall Prevention:   safety round/check completed   clutter free environment maintained   nonskid shoes/slippers when out of bed  Intervention: Prevent Skin Injury  Recent Flowsheet Documentation  Taken 7/3/2025 0836 by Jenna Ontiveros RN  Body Position: position changed independently  Intervention: Prevent and Manage VTE (Venous Thromboembolism) Risk  Recent Flowsheet Documentation  Taken 7/3/2025 0836 by Weston" ALEX West  VTE Prevention/Management: patient refused intervention  Intervention: Prevent Infection  Recent Flowsheet Documentation  Taken 7/3/2025 0836 by Jenna Ontiveros RN  Infection Prevention:   hand hygiene promoted   rest/sleep promoted   single patient room provided  Goal: Optimal Comfort and Wellbeing  Outcome: Progressing  Goal: Readiness for Transition of Care  Outcome: Progressing     Problem: Fall Injury Risk  Goal: Absence of Fall and Fall-Related Injury  Outcome: Progressing  Intervention: Identify and Manage Contributors  Recent Flowsheet Documentation  Taken 7/3/2025 0836 by Jenna Ontiveros RN  Medication Review/Management: medications reviewed  Intervention: Promote Injury-Free Environment  Recent Flowsheet Documentation  Taken 7/3/2025 0836 by Jenna Ontiveros RN  Safety Promotion/Fall Prevention:   safety round/check completed   clutter free environment maintained   nonskid shoes/slippers when out of bed     Problem: Acute Kidney Injury/Impairment  Goal: Fluid and Electrolyte Balance  Outcome: Progressing  Goal: Improved Oral Intake  Outcome: Progressing  Goal: Effective Renal Function  Outcome: Progressing  Intervention: Monitor and Support Renal Function  Recent Flowsheet Documentation  Taken 7/3/2025 0836 by Jenna Ontiveros RN  Medication Review/Management: medications reviewed     Problem: Liver Failure  Goal: Optimal Coping with Liver Failure  Outcome: Progressing  Goal: Absence of Bleeding  Outcome: Progressing  Goal: Fluid and Electrolyte Balance  Outcome: Progressing  Goal: Optimal Gastrointestinal Function  Outcome: Progressing  Goal: Blood Glucose Level Within Target Range  Outcome: Progressing  Goal: Hemodynamic Stability  Outcome: Progressing  Goal: Absence of Infection Signs and Symptoms  Outcome: Progressing  Goal: Optimal Neurologic Function  Outcome: Progressing  Intervention: Monitor and Optimize Neurologic Status  Recent Flowsheet Documentation  Taken 7/3/2025 0836 by Jenna Ontiveros RN  Head  of Bed (HOB) Positioning: HOB at 15 degrees  Goal: Improved Oral Intake  Outcome: Progressing  Goal: Optimal Pain Control, Comfort and Function  Outcome: Progressing  Goal: Effective Oxygenation and Ventilation  Outcome: Progressing  Intervention: Promote Airway Secretion Clearance  Recent Flowsheet Documentation  Taken 7/3/2025 0836 by Jenna Ontiveros, RN  Cough And Deep Breathing: done independently per patient  Activity Management: activity adjusted per tolerance  Intervention: Optimize Oxygenation and Ventilation  Recent Flowsheet Documentation  Taken 7/3/2025 0836 by Jenna Ontiveros, RN  Head of Bed (HOB) Positioning: HOB at 15 degrees

## 2025-07-03 NOTE — PROGRESS NOTES
[facility]    Hospitalist Progress Note  Name: Zoltan Dalton    MRN: [id number]  Provider: Kristan Ayala MD  Date of Service: 07/03/2025    Assessment & Plan   Summary of Stay: oZltan Dalton is a 60 year old male who was admitted on 6/24/2025 admitted for obstructive jaundice with metastatic malignancy hepatobiliary versus pancreatic source.  Presented with abdominal pain.  Past medical history significant for tobacco use disorder.    Metastatic malignancy - hepatobiliary vs pancreatic source  Multiple liver lesions and intrahepatic biliary ductal dilatation s/p stenting 6/26 and repositioning of stent on 7/ 2  Early satiety  40lb weight loss  Epigastric and abdominal pain  Failure to thrive  Severe malnutrition in context of acute illness  Presents with months of above symptoms. Does not have a PCP or interact with the healthcare system often. Hx of ~6 drinks/week for years.   Initial labs notable for tbili 33.4, alk phos 489, AST//140, Cr 2.24, WBC 14.5. Abs US showed moderate to severe intrahepatic biliary ductal dilatation in L hepatic lobe, common duct dilated to 12mm, and heterogenous liver with multiple indeterminate hypoechoic lesions. CT CAP with findings worrisome for liver malignancy- hepatic masses, severe L>R intrahepatic biliary dilatation; several indeterminate pulmonary nodules bilaterally, and small ascites.   Paracentesis unable to be completed 2/2 small volume ascites. AFP<1.8, CA 19-9: 31, CEA 43.1. Hepatitis panel negative.   MRCP showed massive intrahepatic ductal dilatation and findings that may represent intrahepatic obstructing cholangiocarcinoma vs pancreatic neoplasm with mets.   ERCP 6/26 showed biliary obstruction at the hilum 2/2 masses seen on EUS. 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 Underwent sphincterotomy and stenting.   EUS 6/26 with innumerable hepatic masses with obstruction of  bile duct at hilum. FNA done.  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 FNA positive for adenocarcinoma - smears and cell block show clusters of malignant glandular groups consistent with at least a moderately differentiated adenocarcinoma. Correlation with imaging and EUS/ERCP findings is recommended. Awaiting oncology recs now that FNA has resulted.   LFTs have not improved since stenting. GI planning for repeat ERCP.   - Repeat ERCP 7/2  - Follow up FNA results  - Oncology consult              - Outpatient PET scan and followup  - Start mirtazapine for appetite               - Still with little PO intake, may need to consider nutrition support if no improvement after ERCP  - Ordered palliative care consult to clarify goals.  Per oncology patient's tumor is treatable but not curable     Transaminitis   Likely related to above. GI stated it will take time for LFTs to improve after stenting, but it has not improved/mildly worsened.  - Repeat ERCP 7/2  - Trend LFTs     Leukocytosis   Rising WBC but no new infectious symptoms. Also with worsening PIYUSH and LFTs. CT CAP done and showed moderate bilateral pleural effusions and atelectasis, interlobular septal thickening and groundglass attenuation in lower lobes, suggestive of edema, atelectasis, and/or pneumonitis.   Suspect leukocytosis is related to malignancy and not acute infection.  - Trend WBC     PIYUSH  Possible CKD  Cr 2.24 from unclear baseline. Likely pre-renal given improvement with IVF. Cr remains elevated at 1.4. wonder if this is his baseline.  Had new rise in Cr to 1.8. becoming more edematous with IVF. Serum albumin 2.4.   - Albumin x1, consider additional doses   - Avoid nephrotoxins  - AM BMP     Pleural effusions  Possible pulmonary edema  Noted on CT 6/30. No respiratory symptoms, no hypoxia. Has been receiving IVF for PIYUSH.      Colonic mass, ruled out  Previous colonoscopy '21 unremarkable, though  had 4mm tubular adenoma in transverse colon on colonoscopy '15.   Colonoscopy on 6/26 showed a normal colon.      Bilateral pulmonary nodules  Possible metastatic disease vs infectious or inflammatory.   - Follow up CT chest in 3 months pending above workup     Tobacco use disorder - daily smoker, encourage cessation        Diet: Snacks/Supplements Adult: Ensure Plus High Protein; With Meals        DVT Prophylaxis: Pneumatic Compression Devices  Code Status: Full Code    Disposition:   Medically Ready for Discharge: Anticipated in 2-4 Days        Interval History   Reviewed chart patient more awake today.  Is communicating.  Says pain is better controlled.  Will advance diet more than 10 review of system was carried out was otherwise negative total time spent for patient care and coronation of care is more than 35minutes  -Data reviewed today: I reviewed all new labs and imaging reports over the last 24 hours. I personally reviewed no images or EKG's today.    Physical Exam   Temp: 97.4  F (36.3  C) Temp src: Oral BP: 128/44 Pulse: 75   Resp: 16 SpO2: 96 % O2 Device: Nasal cannula Oxygen Delivery: 1 LPM  Vitals:    06/24/25 0628 06/24/25 1500 07/02/25 0737   Weight: 59 kg (130 lb 1.1 oz) 56.4 kg (124 lb 4.8 oz) 55.1 kg (121 lb 7.6 oz)     Vital Signs with Ranges  Temp:  [97.2  F (36.2  C)-97.8  F (36.6  C)] 97.4  F (36.3  C)  Pulse:  [] 75  Resp:  [16-32] 16  BP: ()/(44-88) 128/44  SpO2:  [93 %-100 %] 96 %  I/O last 3 completed shifts:  In: 1250 [I.V.:950; IV Piggyback:300]  Out: 0       GEN: Awake and communicating NAD.  HEENT:  Normocephalic/atraumatic, anicteric, no nasal discharge, mouth dry  CV:  Regular rate and rhythm, no murmur or JVD.  S1 + S2 noted, no S3 or S4.  LUNGS:  Clear to auscultation bilaterally without rales/rhonchi/wheezing/retractions.  Symmetric chest rise on inhalation noted.  ABD:  Active bowel sounds, soft, non-tender/non-distended.  No rebound/guarding/rigidity.  EXT:  No  edema.  No cyanosis.  No joint synovitis noted.  SKIN:  Dry to touch, no exanthems noted in the visualized areas.    Medications   Current Facility-Administered Medications   Medication Dose Route Frequency Provider Last Rate Last Admin     Current Facility-Administered Medications   Medication Dose Route Frequency Provider Last Rate Last Admin    mirtazapine (REMERON) tablet 15 mg  15 mg Oral At Bedtime Jane Rhodes DO   15 mg at 07/02/25 2229    senna-docusate (SENOKOT-S/PERICOLACE) 8.6-50 MG per tablet 1 tablet  1 tablet Oral BID Ham Nuñez APRN CNP   1 tablet at 07/02/25 2035    Or    senna-docusate (SENOKOT-S/PERICOLACE) 8.6-50 MG per tablet 2 tablet  2 tablet Oral BID Ham Nuñez APRN CNP   2 tablet at 06/29/25 1119    sodium chloride (PF) 0.9% PF flush 3 mL  3 mL Intracatheter Q8H [facility] Ham Nuñez APRN CNP   3 mL at 07/03/25 0634     Data     Recent Labs   Lab 07/03/25  0706 07/02/25  0912 07/01/25  0723   WBC 19.5* 16.0* 18.0*   HGB 10.3* 10.1* 10.8*   HCT 28.6* 28.0* 30.1*   MCV 98 96 96    252 271     Recent Labs   Lab 07/03/25  0706 07/03/25  0611 07/03/25  0210 07/02/25  2234 07/02/25  2057 07/02/25  0932 07/02/25  0912 07/01/25  1548   *  --   --   --   --   --  147* 149*   POTASSIUM 3.6  --   --   --  4.2  --  2.9* 3.5   CHLORIDE 116*  --   --   --   --   --  114* 115*   CO2 22  --   --   --   --   --  21* 22   ANIONGAP 11  --   --   --   --   --  12 12   * 84 113*   < >  --    < > 117* 120*   BUN 47.3*  --   --   --   --   --  50.0* 54.4*   CR 1.66*  --   --   --   --   --  1.63* 1.84*   GFRESTIMATED 47*  --   --   --   --   --  48* 41*   SANDRA 11.3*  --   --   --   --   --  11.5* 11.4*    < > = values in this interval not displayed.     7-Day Micro Results       No results found for the last 168 hours.          Recent Labs   Lab 07/03/25 0706 07/02/25 0912 07/01/25  0723   HGB 10.3* 10.1* 10.8*     Recent Labs   Lab 07/03/25 0706 07/02/25  0912  "07/01/25  0723   * 112* 153*   * 166* 205*   ALKPHOS 360* 351* 411*   BILITOTAL 28.8* 31.0* 27.2*     No results for input(s): \"INR\" in the last 168 hours.    Recent Labs   Lab 07/03/25  0915   LACT 1.6     No results for input(s): \"LIPASE\" in the last 168 hours.  No results for input(s): \"TSH\" in the last 168 hours.  No results for input(s): \"COLOR\", \"APPEARANCE\", \"URINEGLC\", \"URINEBILI\", \"URINEKETONE\", \"SG\", \"UBLD\", \"URINEPH\", \"PROTEIN\", \"UROBILINOGEN\", \"NITRITE\", \"LEUKEST\", \"RBCU\", \"WBCU\" in the last 168 hours.    Recent Results (from the past 24 hours)   XR ERCP    Narrative    This exam was marked as non-reportable because it will not be read by a   radiologist or a [place] non-radiologist provider.                "

## 2025-07-04 LAB
ALBUMIN SERPL BCG-MCNC: 2.6 G/DL (ref 3.5–5.2)
ALP SERPL-CCNC: 351 U/L (ref 40–150)
ALT SERPL W P-5'-P-CCNC: 175 U/L (ref 0–70)
ANION GAP SERPL CALCULATED.3IONS-SCNC: 10 MMOL/L (ref 7–15)
AST SERPL W P-5'-P-CCNC: 113 U/L (ref 0–45)
BILIRUB DIRECT SERPL-MCNC: 19.38 MG/DL (ref 0–0.3)
BILIRUB SERPL-MCNC: 25.6 MG/DL
BUN SERPL-MCNC: 48.9 MG/DL (ref 8–23)
CALCIUM SERPL-MCNC: 11.2 MG/DL (ref 8.8–10.4)
CHLORIDE SERPL-SCNC: 120 MMOL/L (ref 98–107)
CREAT SERPL-MCNC: 1.74 MG/DL (ref 0.67–1.17)
EGFRCR SERPLBLD CKD-EPI 2021: 44 ML/MIN/1.73M2
ERYTHROCYTE [DISTWIDTH] IN BLOOD BY AUTOMATED COUNT: 25.9 % (ref 10–15)
GLUCOSE SERPL-MCNC: 119 MG/DL (ref 70–99)
HCO3 SERPL-SCNC: 23 MMOL/L (ref 22–29)
HCT VFR BLD AUTO: 29.3 % (ref 40–53)
HGB BLD-MCNC: 10.5 G/DL (ref 13.3–17.7)
MAGNESIUM SERPL-MCNC: 2.5 MG/DL (ref 1.7–2.3)
MCH RBC QN AUTO: 35.1 PG (ref 26.5–33)
MCHC RBC AUTO-ENTMCNC: 35.8 G/DL (ref 31.5–36.5)
MCV RBC AUTO: 98 FL (ref 78–100)
PHOSPHATE SERPL-MCNC: 2.7 MG/DL (ref 2.5–4.5)
PLATELET # BLD AUTO: 209 10E3/UL (ref 150–450)
POTASSIUM SERPL-SCNC: 3.4 MMOL/L (ref 3.4–5.3)
PROT SERPL-MCNC: 4.4 G/DL (ref 6.4–8.3)
RBC # BLD AUTO: 2.99 10E6/UL (ref 4.4–5.9)
SODIUM SERPL-SCNC: 153 MMOL/L (ref 135–145)
WBC # BLD AUTO: 17.7 10E3/UL (ref 4–11)

## 2025-07-04 PROCEDURE — 36415 COLL VENOUS BLD VENIPUNCTURE: CPT | Performed by: INTERNAL MEDICINE

## 2025-07-04 PROCEDURE — 120N000001 HC R&B MED SURG/OB

## 2025-07-04 PROCEDURE — 258N000003 HC RX IP 258 OP 636: Performed by: INTERNAL MEDICINE

## 2025-07-04 PROCEDURE — 250N000013 HC RX MED GY IP 250 OP 250 PS 637: Performed by: INTERNAL MEDICINE

## 2025-07-04 PROCEDURE — 83735 ASSAY OF MAGNESIUM: CPT | Performed by: INTERNAL MEDICINE

## 2025-07-04 PROCEDURE — 82248 BILIRUBIN DIRECT: CPT | Performed by: INTERNAL MEDICINE

## 2025-07-04 PROCEDURE — 82310 ASSAY OF CALCIUM: CPT | Performed by: STUDENT IN AN ORGANIZED HEALTH CARE EDUCATION/TRAINING PROGRAM

## 2025-07-04 PROCEDURE — 250N000013 HC RX MED GY IP 250 OP 250 PS 637: Performed by: NURSE PRACTITIONER

## 2025-07-04 PROCEDURE — 250N000013 HC RX MED GY IP 250 OP 250 PS 637: Performed by: STUDENT IN AN ORGANIZED HEALTH CARE EDUCATION/TRAINING PROGRAM

## 2025-07-04 PROCEDURE — 250N000011 HC RX IP 250 OP 636: Performed by: NURSE PRACTITIONER

## 2025-07-04 PROCEDURE — 99232 SBSQ HOSP IP/OBS MODERATE 35: CPT | Performed by: INTERNAL MEDICINE

## 2025-07-04 PROCEDURE — 250N000011 HC RX IP 250 OP 636: Performed by: INTERNAL MEDICINE

## 2025-07-04 PROCEDURE — 85018 HEMOGLOBIN: CPT | Performed by: STUDENT IN AN ORGANIZED HEALTH CARE EDUCATION/TRAINING PROGRAM

## 2025-07-04 PROCEDURE — P9047 ALBUMIN (HUMAN), 25%, 50ML: HCPCS | Performed by: INTERNAL MEDICINE

## 2025-07-04 PROCEDURE — 84100 ASSAY OF PHOSPHORUS: CPT | Performed by: INTERNAL MEDICINE

## 2025-07-04 RX ORDER — ALBUMIN (HUMAN) 12.5 G/50ML
12.5 SOLUTION INTRAVENOUS ONCE
Status: COMPLETED | OUTPATIENT
Start: 2025-07-04 | End: 2025-07-04

## 2025-07-04 RX ORDER — POTASSIUM CHLORIDE 29.8 MG/ML
20 INJECTION INTRAVENOUS
Status: DISCONTINUED | OUTPATIENT
Start: 2025-07-04 | End: 2025-07-04

## 2025-07-04 RX ORDER — POTASSIUM CHLORIDE 20MEQ/15ML
40 LIQUID (ML) ORAL ONCE
Status: COMPLETED | OUTPATIENT
Start: 2025-07-04 | End: 2025-07-04

## 2025-07-04 RX ORDER — SODIUM CHLORIDE 9 MG/ML
INJECTION, SOLUTION INTRAVENOUS CONTINUOUS
Status: DISCONTINUED | OUTPATIENT
Start: 2025-07-04 | End: 2025-07-06

## 2025-07-04 RX ADMIN — ONDANSETRON 4 MG: 2 INJECTION INTRAMUSCULAR; INTRAVENOUS at 19:24

## 2025-07-04 RX ADMIN — POTASSIUM CHLORIDE 40 MEQ: 1.5 SOLUTION ORAL at 18:58

## 2025-07-04 RX ADMIN — ALBUMIN HUMAN 12.5 G: 0.25 SOLUTION INTRAVENOUS at 11:22

## 2025-07-04 RX ADMIN — MIRTAZAPINE 15 MG: 15 TABLET, FILM COATED ORAL at 22:11

## 2025-07-04 RX ADMIN — SODIUM CHLORIDE: 0.9 INJECTION, SOLUTION INTRAVENOUS at 11:23

## 2025-07-04 RX ADMIN — SENNOSIDES AND DOCUSATE SODIUM 2 TABLET: 50; 8.6 TABLET ORAL at 20:20

## 2025-07-04 ASSESSMENT — ACTIVITIES OF DAILY LIVING (ADL)
ADLS_ACUITY_SCORE: 45
ADLS_ACUITY_SCORE: 48
ADLS_ACUITY_SCORE: 45
ADLS_ACUITY_SCORE: 45
ADLS_ACUITY_SCORE: 48
ADLS_ACUITY_SCORE: 45
ADLS_ACUITY_SCORE: 45
ADLS_ACUITY_SCORE: 48
ADLS_ACUITY_SCORE: 45
ADLS_ACUITY_SCORE: 48
ADLS_ACUITY_SCORE: 45
ADLS_ACUITY_SCORE: 45
ADLS_ACUITY_SCORE: 48
ADLS_ACUITY_SCORE: 45

## 2025-07-04 NOTE — PROGRESS NOTES
[facility]    Hospitalist Progress Note  Name: Zoltan Dalton    MRN: [id number]  Provider: Kristan Ayala MD  Date of Service: 07/04/2025    Assessment & Plan   Summary of Stay: Zoltan Dalton is a 60 year old male who was admitted on 6/24/2025 admitted for obstructive jaundice with metastatic malignancy hepatobiliary versus pancreatic source.  Presented with abdominal pain.  Past medical history significant for tobacco use disorder.    Metastatic malignancy - hepatobiliary vs pancreatic source  Multiple liver lesions and intrahepatic biliary ductal dilatation s/p stenting 6/26 and repositioning of stent on 7/ 2  Early satiety  40lb weight loss  Epigastric and abdominal pain  Failure to thrive  Severe malnutrition in context of acute illness  Presents with months of above symptoms. Does not have a PCP or interact with the healthcare system often. Hx of ~6 drinks/week for years.   Initial labs notable for tbili 33.4, alk phos 489, AST//140, Cr 2.24, WBC 14.5. Abs US showed moderate to severe intrahepatic biliary ductal dilatation in L hepatic lobe, common duct dilated to 12mm, and heterogenous liver with multiple indeterminate hypoechoic lesions. CT CAP with findings worrisome for liver malignancy- hepatic masses, severe L>R intrahepatic biliary dilatation; several indeterminate pulmonary nodules bilaterally, and small ascites.   Paracentesis unable to be completed 2/2 small volume ascites. AFP<1.8, CA 19-9: 31, CEA 43.1. Hepatitis panel negative.   MRCP showed massive intrahepatic ductal dilatation and findings that may represent intrahepatic obstructing cholangiocarcinoma vs pancreatic neoplasm with mets.   ERCP 6/26 showed biliary obstruction at the hilum 2/2 masses seen on EUS. 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 Underwent sphincterotomy and stenting.   EUS 6/26 with innumerable hepatic masses with obstruction of  bile duct at hilum. FNA done.  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 FNA positive for adenocarcinoma - smears and cell block show clusters of malignant glandular groups consistent with at least a moderately differentiated adenocarcinoma. Correlation with imaging and EUS/ERCP findings is recommended. Awaiting oncology recs now that FNA has resulted.   LFTs have not improved since stenting. GI planning for repeat ERCP.   - Repeat ERCP 7/2  - Follow up FNA results  - Oncology consult              - Outpatient PET scan and followup  - Start mirtazapine for appetite               - Still with little PO intake, may need to consider nutrition support if no improvement after ERCP  - Ordered palliative care consult to clarify goals.  Per oncology patient's tumor is treatable but not curable     Transaminitis : Improving slowly  Likely related to above. GI stated it will take time for LFTs to improve after stenting, but it has not improved/mildly worsened.  - Repeat ERCP 7/2  - Trend LFTs     Leukocytosis : Improving  Rising WBC but no new infectious symptoms. Also with worsening PIYUSH and LFTs. CT CAP done and showed moderate bilateral pleural effusions and atelectasis, interlobular septal thickening and groundglass attenuation in lower lobes, suggestive of edema, atelectasis, and/or pneumonitis.   Suspect leukocytosis is related to malignancy and not acute infection.  - Trend WBC    Hypernatremia  PIYUSH with worsening renal functions  Possible CKD  Cr 2.24 from unclear baseline. Likely pre-renal given improvement with IVF. Cr remains elevated at 1.4. wonder if this is his baseline.  -Creatinine up to 1.7  - Will try albumin today  - Avoid nephrotoxins  - AM BMP     Pleural effusions  Possible pulmonary edema  Noted on CT 6/30. No respiratory symptoms, no hypoxia. Has been receiving IVF for PIYUSH.      Colonic mass, ruled out  Previous colonoscopy '21 unremarkable, though  had 4mm tubular adenoma in transverse colon on colonoscopy '15.   Colonoscopy on 6/26 showed a normal colon.      Bilateral pulmonary nodules  Possible metastatic disease vs infectious or inflammatory.   - Follow up CT chest in 3 months pending above workup     Tobacco use disorder - daily smoker, encourage cessation        Diet: Snacks/Supplements Adult: Ensure Plus High Protein; With Meals        DVT Prophylaxis: Pneumatic Compression Devices  Code Status: Full Code    Disposition:   Medically Ready for Discharge: Anticipated in 2-4 Days        Interval History   Reviewed chart patient more awake communicating is trying to take some Ensure by mouth.  But has poor oral intake.  t more than 10 review of system was carried out was otherwise negative total time spent for patient care and coronation of care is more than 35minutes  -Data reviewed today: I reviewed all new labs and imaging reports over the last 24 hours. I personally reviewed no images or EKG's today.    Physical Exam   Temp: 98  F (36.7  C) Temp src: Oral BP: 129/58 Pulse: 78   Resp: 16 SpO2: 97 % O2 Device: Nasal cannula Oxygen Delivery: 1 LPM  Vitals:    06/24/25 0628 06/24/25 1500 07/02/25 0737   Weight: 59 kg (130 lb 1.1 oz) 56.4 kg (124 lb 4.8 oz) 55.1 kg (121 lb 7.6 oz)     Vital Signs with Ranges  Temp:  [97.4  F (36.3  C)-98.4  F (36.9  C)] 98  F (36.7  C)  Pulse:  [75-82] 78  Resp:  [16-18] 16  BP: (111-139)/(44-68) 129/58  SpO2:  [94 %-97 %] 97 %  No intake/output data recorded.      GEN: Awake and communicating NAD.  HEENT:  Normocephalic/atraumatic, anicteric, no nasal discharge, mouth dry  CV:  Regular rate and rhythm, no murmur or JVD.  S1 + S2 noted, no S3 or S4.  LUNGS:  Clear to auscultation bilaterally without rales/rhonchi/wheezing/retractions.  Symmetric chest rise on inhalation noted.  ABD:  Active bowel sounds, soft, non-tender/non-distended.  No rebound/guarding/rigidity.  EXT:  No edema.  No cyanosis.  No joint synovitis  noted.  SKIN:  Dry to touch, no exanthems noted in the visualized areas.    Medications   Current Facility-Administered Medications   Medication Dose Route Frequency Provider Last Rate Last Admin     Current Facility-Administered Medications   Medication Dose Route Frequency Provider Last Rate Last Admin    mirtazapine (REMERON) tablet 15 mg  15 mg Oral At Bedtime Meagan, Jane, DO   15 mg at 07/03/25 2113    senna-docusate (SENOKOT-S/PERICOLACE) 8.6-50 MG per tablet 1 tablet  1 tablet Oral BID Ham Nuñez APRN CNP   1 tablet at 07/02/25 2035    Or    senna-docusate (SENOKOT-S/PERICOLACE) 8.6-50 MG per tablet 2 tablet  2 tablet Oral BID Ham Nuñez APRN CNP   2 tablet at 06/29/25 1119    sodium chloride (PF) 0.9% PF flush 3 mL  3 mL Intracatheter Q8H VERONICA Ham Nuñez APRN CNP   3 mL at 07/03/25 2114     Data     Recent Labs   Lab 07/04/25  0702 07/03/25  0706 07/02/25  0912   WBC 17.7* 19.5* 16.0*   HGB 10.5* 10.3* 10.1*   HCT 29.3* 28.6* 28.0*   MCV 98 98 96    220 252     Recent Labs   Lab 07/04/25  0702 07/03/25  0706 07/03/25  0611 07/02/25  2234 07/02/25 2057 07/02/25  0932 07/02/25  0912   * 149*  --   --   --   --  147*   POTASSIUM 3.4 3.6  --   --  4.2  --  2.9*   CHLORIDE 120* 116*  --   --   --   --  114*   CO2 23 22  --   --   --   --  21*   ANIONGAP 10 11  --   --   --   --  12   * 115* 84   < >  --    < > 117*   BUN 48.9* 47.3*  --   --   --   --  50.0*   CR 1.74* 1.66*  --   --   --   --  1.63*   GFRESTIMATED 44* 47*  --   --   --   --  48*   SANDRA 11.2* 11.3*  --   --   --   --  11.5*    < > = values in this interval not displayed.     7-Day Micro Results       No results found for the last 168 hours.          Recent Labs   Lab 07/04/25  0702 07/03/25  0706 07/02/25  0912   HGB 10.5* 10.3* 10.1*     Recent Labs   Lab 07/04/25  0702 07/03/25  0706 07/02/25  0912   * 138* 112*   * 188* 166*   ALKPHOS 351* 360* 351*   BILITOTAL 25.6* 28.8*  "31.0*     No results for input(s): \"INR\" in the last 168 hours.    Recent Labs   Lab 07/03/25  0915   LACT 1.6     No results for input(s): \"LIPASE\" in the last 168 hours.  No results for input(s): \"TSH\" in the last 168 hours.  No results for input(s): \"COLOR\", \"APPEARANCE\", \"URINEGLC\", \"URINEBILI\", \"URINEKETONE\", \"SG\", \"UBLD\", \"URINEPH\", \"PROTEIN\", \"UROBILINOGEN\", \"NITRITE\", \"LEUKEST\", \"RBCU\", \"WBCU\" in the last 168 hours.    No results found for this or any previous visit (from the past 24 hours).         "

## 2025-07-04 NOTE — PROGRESS NOTES
Hem- Onc chart check    Chart reviewed. Please see Dr Roberto's note dated 7/2  No new recommendations  PLease call with questions or concerns      MD FER YangO

## 2025-07-04 NOTE — PROGRESS NOTES
"CLINICAL NUTRITION SERVICES - REASSESSMENT NOTE     RECOMMENDATIONS FOR MDs/PROVIDERS TO ORDER:  Please consult if nutrition support is desired.    Registered Dietitian Interventions:  Discussed nutritional concerns and current interventions with provider. Provider hopeful that improvements in liver functions may help pt's appetite. Deferring nutrition POC to provider.     Continue Remeron for appetite stimulant.     Continue diet as ordered.     Continue Ensure with meals (Clear while on CLD, Plus HP as diet is adv).     Future/Additional Recommendations:  Oral intake encouragement greatly appreciated.   RD team will continue to follow acutely per policy.      Received RN Consult - \"severe malnutrition\". RD team currently following patient. Discussed with provider.    INFORMATION OBTAINED  Spoke with family member in room. Patient sleeping upon visit.    CURRENT NUTRITION ORDERS  Diet: Clear Liquid - \"Sips of clear liquids if tolerated; Then advance as tolerated.\"  Snacks/Supplements: Ensure Plus HP w/ meals (vanilla, TID). Apple Ensure Clear being sent per [employer].      CURRENT INTAKE/TOLERANCE  - No documented intakes since 7/1. Last documented 0-25% x2 intakes 7/1. Total of intakes x5 documented since admission, ranging 0-50%. NPO/CLD 6/24-6/27.   - Per provider today,\"trying to take some Ensure by mouth. But has poor oral intake.\"   - 7/3: Noted poor appetite/po per nursing. Received one meal tray today (chicken broth, lemon lime soda, strawberry kiwi water, orange jello).  - 7/2: Refused to eat per nursing.    Family member tells me pt drank 100% Ensure Clear this morning and has not eaten/drank anything else today. Very low energy. RD discussed current nutrition plan with family member, and was made aware that pt is currently receiving nutrition supplements with meals and getting Remeron daily to stimulate appetite. RD asked family member if he thought pt would be open to nutrition support (feeding tube) " "to optimize pt's nutrition given negligible intakes, malnutrition, and desire for restorative cares per palliative visit. Family member tells me his does NOT think pt will want a feeding tube but it is worth a shot to have this conversation with his provider, at least to \"encourage him to eat a little more.\" Provider made aware. Will defer POC to provider.      NEW FINDINGS  7/3: Palliative consult, GOC = Life-prolonging without limits.  - Noted lethargic and somnolent.    7/2: ERCP, impression = \"Biliary stent exchange. 15 cm x 10 Maltese plastic stent was placed into the dilated segment of the left intrahepatics. Previously placed biliary stent was removed.\"     GI symptoms: Last BM 7/2 per pt, per flowsheets.    Skin/wounds: Reviewed. Trace 1+ pedal edema. No PI indicated.    Nutrition-relevant labs: Reviewed  Noted: Na 153 (H), BUN 48.9 (H), Cr 1.74 (H), Mg 2.5 (H), Alk Phos 351 (H),  (H),  (H), dBili 19.38 (H), tBili 25.6 (H)    Nutrition-relevant medications: Reviewed  Noted: Remeron, Senokot given prn, IV NS @ 75 mL/hr    Weight:   07/02/25 0737 55.1 kg (121 lb 7.6 oz) Standing scale  06/24/25 1500 56.4 kg (124 lb 4.8 oz) Standing scale  06/24/25 0628 59 kg (130 lb 1.1 oz) --     ASSESSED NUTRITION NEEDS  Dosing Weight: 55.1 kg, based on actual wt (standing scale)  Estimated Energy Needs: 4429-8095 kcals/day (30 - 35 kcals/kg)  Justification: Increased needs and Underweight  Estimated Protein Needs: 66-83 grams protein/day (1.2 - 1.5 grams of pro/kg)  Justification: Increased needs  Estimated Fluid Needs: Per provider pending fluid status    MALNUTRITION  % Intake: </= 50% for >/= 5 days (severe) and </=75% for >/= 1 month (severe)  % Weight Loss: > 10% in 6 months (severe)   Subcutaneous Fat Loss: Severe facial losses - pt tucked in blanket and resting today  Muscle Loss: Temples (temporalis muscle): Severe and Clavicles (pectoralis and deltoids): Severe - visual, pt covered in blankets  (based " on 6/28 assessment. Unable to visualize today d/t pt tucked in blanket and resting today)  Fluid Accumulation/Edema: Does not meet criteria  Malnutrition Diagnosis: Severe malnutrition in the context of chronic illness  Malnutrition Present on Admission: Yes    EVALUATION OF THE PROGRESS TOWARD GOALS   Previous Goals  Patient to consume >/= 50% of nutritionally adequate meals or supplements at least BID to start to show improved trending of intakes.  Evaluation: Not progressing    Previous Nutrition Diagnosis  Inadequate oral intake related to lethargy, new possible metastatic disease as evidenced by intakes this admission, weight loss, severe muscle/fat depletions, daily appetite stimulant.  Evaluation: No change    NUTRITION DIAGNOSIS  Inadequate oral intake related to lethargy, new possible metastatic disease as evidenced by intakes this admission, weight loss, severe muscle/fat depletions, daily appetite stimulant.    INTERVENTIONS  See nutrition interventions above    GOALS  Patient to consume >/= 50% of nutritionally adequate meals or supplements at least BID to start to show improved trending of intakes.  Diet adv / improvement in po intakes vs nutrition support within 1-3 days.     MONITORING/EVALUATION  Progress toward goals will be monitored and evaluated per policy.    Carlita Chapman RD, LD  Available on [employer]

## 2025-07-04 NOTE — PLAN OF CARE
"  End OF Shift Summary :    Neuro:   A&04 lethargic arouses to voice.pt at night became less cooperative and had episodes of getting off the bed needed reminder.         Cardiac:  WDL denies chest pain.        Respiratory : lung sounds diminished infrequent cough nonproductive.on capno.1L NC       GI &  :GI WDL bowel sounds audile patient had dark clem urine.MD aware      Skin : skin appears yellow.      Mobility : generalized weakness       IV access :  Right lower forearm patent flushed and saline locked.                       Goal Outcome Evaluation:      Plan of Care Reviewed With: patient    Overall Patient Progress: no changeOverall Patient Progress: no change         Problem: Adult Inpatient Plan of Care  Goal: Plan of Care Review  Description: The Plan of Care Review/Shift note should be completed every shift.  The Outcome Evaluation is a brief statement about your assessment that the patient is improving, declining, or no change.  This information will be displayed automatically on your shift  note.  Outcome: Progressing  Flowsheets (Taken 7/4/2025 6368)  Plan of Care Reviewed With: patient  Overall Patient Progress: no change  Goal: Patient-Specific Goal (Individualized)  Description: You can add care plan individualizations to a care plan. Examples of Individualization might be:  \"Parent requests to be called daily at 9am for status\", \"I have a hard time hearing out of my right ear\", or \"Do not touch me to wake me up as it startles  me\".  Outcome: Progressing  Goal: Absence of Hospital-Acquired Illness or Injury  Outcome: Progressing  Intervention: Identify and Manage Fall Risk  Recent Flowsheet Documentation  Taken 7/3/2025 2121 by Monisha Gerardo RN  Safety Promotion/Fall Prevention:   activity supervised   safety round/check completed   room near nurse's station  Intervention: Prevent Skin Injury  Recent Flowsheet Documentation  Taken 7/3/2025 2121 by Monisha Gerardo RN  Body Position: position " changed independently  Intervention: Prevent and Manage VTE (Venous Thromboembolism) Risk  Recent Flowsheet Documentation  Taken 7/3/2025 2121 by Monisha Gerardo RN  VTE Prevention/Management: (pt refused) SCDs off (sequential compression devices)  Goal: Optimal Comfort and Wellbeing  Outcome: Progressing  Goal: Readiness for Transition of Care  Outcome: Progressing     Problem: Fall Injury Risk  Goal: Absence of Fall and Fall-Related Injury  Outcome: Progressing  Intervention: Promote Injury-Free Environment  Recent Flowsheet Documentation  Taken 7/3/2025 2121 by Monisha Gerardo RN  Safety Promotion/Fall Prevention:   activity supervised   safety round/check completed   room near nurse's station     Problem: Acute Kidney Injury/Impairment  Goal: Fluid and Electrolyte Balance  Outcome: Progressing  Goal: Improved Oral Intake  Outcome: Progressing  Goal: Effective Renal Function  Outcome: Progressing     Problem: Liver Failure  Goal: Optimal Coping with Liver Failure  Outcome: Progressing  Goal: Absence of Bleeding  Outcome: Progressing  Goal: Fluid and Electrolyte Balance  Outcome: Progressing  Goal: Optimal Gastrointestinal Function  Outcome: Progressing  Goal: Blood Glucose Level Within Target Range  Outcome: Progressing  Goal: Hemodynamic Stability  Outcome: Progressing  Goal: Absence of Infection Signs and Symptoms  Outcome: Progressing  Goal: Optimal Neurologic Function  Outcome: Progressing  Intervention: Monitor and Optimize Neurologic Status  Recent Flowsheet Documentation  Taken 7/3/2025 2121 by Monisha Gerardo RN  Head of Bed (HOB) Positioning: HOB at 15 degrees  Goal: Improved Oral Intake  Outcome: Progressing  Goal: Optimal Pain Control, Comfort and Function  Outcome: Progressing  Goal: Effective Oxygenation and Ventilation  Outcome: Progressing  Intervention: Promote Airway Secretion Clearance  Recent Flowsheet Documentation  Taken 7/3/2025 2121 by Monisha Gerardo RN  Cough And Deep Breathing:  done independently per patient  Activity Management:   activity adjusted per tolerance   ambulated to bathroom   back to bed  Intervention: Optimize Oxygenation and Ventilation  Recent Flowsheet Documentation  Taken 7/3/2025 2121 by Monisha Gerardo, RN  Head of Bed (HOB) Positioning: HOB at 15 degrees

## 2025-07-04 NOTE — PLAN OF CARE
"Goal Outcome Evaluation:           Overall Patient Progress: no changeOverall Patient Progress: no change    Outcome Evaluation: Poor oral intake, started on IVFs, VSS, didn't get oob this shift, lethargic, but Alert, family at bedside. Continues with ongoing care.        End of shift summary-0700-1500  Dx: Metastatic malignancy - hepatobiliary vs pancreatic source.   A/O: Alert and Oriented x4  Diet: Clear liquid  Fluids: has Normal Saline 0.9% running at 75 mL per hour.  Transfer: have not been out of bed yet  Bathroom: continent with b  Pain: denying pain.  Telemetry Monitoring: No  Treatment: Albumin, symptom management.   Discharge Plans: TBD, continues with ongoing care.         Blood pressure 129/58, pulse 78, temperature 98  F (36.7  C), temperature source Oral, resp. rate 16, height 1.727 m (5' 8\"), weight 55.1 kg (121 lb 7.6 oz), SpO2 97%.     Problem: Adult Inpatient Plan of Care  Goal: Plan of Care Review  Description: The Plan of Care Review/Shift note should be completed every shift.  The Outcome Evaluation is a brief statement about your assessment that the patient is improving, declining, or no change.  This information will be displayed automatically on your shift  note.  7/4/2025 1324 by Harshad Reed RN  Outcome: Progressing  Flowsheets (Taken 7/4/2025 1324)  Outcome Evaluation: Poor oral intake, started on IVFs,VSS,  didn't get oob this shift, lethargic, but Alert, family at bedside. Continues with ongoing care.  Plan of Care Reviewed With: patient  Overall Patient Progress: no change  7/4/2025 1321 by Harshad Reed RN  Outcome: Progressing  Flowsheets (Taken 7/4/2025 1321)  Outcome Evaluation: Poor oral intake, started on IVFs, VSS, didn't get oob this shift, lethargic, but Alert, family at bedside. Continues with ongoing care.  Overall Patient Progress: no change     "

## 2025-07-05 LAB
ALBUMIN SERPL BCG-MCNC: 2.7 G/DL (ref 3.5–5.2)
ALP SERPL-CCNC: 339 U/L (ref 40–150)
ALT SERPL W P-5'-P-CCNC: 174 U/L (ref 0–70)
ANION GAP SERPL CALCULATED.3IONS-SCNC: 10 MMOL/L (ref 7–15)
AST SERPL W P-5'-P-CCNC: 104 U/L (ref 0–45)
BILIRUB SERPL-MCNC: 27.8 MG/DL
BUN SERPL-MCNC: 47 MG/DL (ref 8–23)
CALCIUM SERPL-MCNC: 11.2 MG/DL (ref 8.8–10.4)
CHLORIDE SERPL-SCNC: 125 MMOL/L (ref 98–107)
CREAT SERPL-MCNC: 1.61 MG/DL (ref 0.67–1.17)
CREAT UR-MCNC: 84.9 MG/DL
EGFRCR SERPLBLD CKD-EPI 2021: 48 ML/MIN/1.73M2
ERYTHROCYTE [DISTWIDTH] IN BLOOD BY AUTOMATED COUNT: 26.5 % (ref 10–15)
FRACT EXCRET NA UR+SERPL-RTO: 0.2 %
GLUCOSE SERPL-MCNC: 120 MG/DL (ref 70–99)
HCO3 SERPL-SCNC: 21 MMOL/L (ref 22–29)
HCT VFR BLD AUTO: 29.3 % (ref 40–53)
HGB BLD-MCNC: 10.3 G/DL (ref 13.3–17.7)
MAGNESIUM SERPL-MCNC: 2.5 MG/DL (ref 1.7–2.3)
MCH RBC QN AUTO: 35.3 PG (ref 26.5–33)
MCHC RBC AUTO-ENTMCNC: 35.2 G/DL (ref 31.5–36.5)
MCV RBC AUTO: 100 FL (ref 78–100)
PHOSPHATE SERPL-MCNC: 2.6 MG/DL (ref 2.5–4.5)
PLATELET # BLD AUTO: 209 10E3/UL (ref 150–450)
POTASSIUM SERPL-SCNC: 3.3 MMOL/L (ref 3.4–5.3)
POTASSIUM SERPL-SCNC: 3.4 MMOL/L (ref 3.4–5.3)
POTASSIUM SERPL-SCNC: 3.4 MMOL/L (ref 3.4–5.3)
POTASSIUM SERPL-SCNC: 3.8 MMOL/L (ref 3.4–5.3)
POTASSIUM UR-SCNC: 54.3 MMOL/L
PROT SERPL-MCNC: 4.8 G/DL (ref 6.4–8.3)
RBC # BLD AUTO: 2.92 10E6/UL (ref 4.4–5.9)
SODIUM SERPL-SCNC: 156 MMOL/L (ref 135–145)
SODIUM SERPL-SCNC: 157 MMOL/L (ref 135–145)
SODIUM UR-SCNC: 20 MMOL/L
WBC # BLD AUTO: 17.3 10E3/UL (ref 4–11)

## 2025-07-05 PROCEDURE — 250N000013 HC RX MED GY IP 250 OP 250 PS 637: Performed by: STUDENT IN AN ORGANIZED HEALTH CARE EDUCATION/TRAINING PROGRAM

## 2025-07-05 PROCEDURE — P9047 ALBUMIN (HUMAN), 25%, 50ML: HCPCS | Performed by: INTERNAL MEDICINE

## 2025-07-05 PROCEDURE — 84100 ASSAY OF PHOSPHORUS: CPT | Performed by: INTERNAL MEDICINE

## 2025-07-05 PROCEDURE — 250N000011 HC RX IP 250 OP 636: Performed by: INTERNAL MEDICINE

## 2025-07-05 PROCEDURE — 120N000001 HC R&B MED SURG/OB

## 2025-07-05 PROCEDURE — 258N000003 HC RX IP 258 OP 636: Performed by: INTERNAL MEDICINE

## 2025-07-05 PROCEDURE — 84132 ASSAY OF SERUM POTASSIUM: CPT | Performed by: INTERNAL MEDICINE

## 2025-07-05 PROCEDURE — 84133 ASSAY OF URINE POTASSIUM: CPT | Performed by: INTERNAL MEDICINE

## 2025-07-05 PROCEDURE — 250N000013 HC RX MED GY IP 250 OP 250 PS 637: Performed by: INTERNAL MEDICINE

## 2025-07-05 PROCEDURE — 99232 SBSQ HOSP IP/OBS MODERATE 35: CPT | Performed by: INTERNAL MEDICINE

## 2025-07-05 PROCEDURE — 85027 COMPLETE CBC AUTOMATED: CPT | Performed by: STUDENT IN AN ORGANIZED HEALTH CARE EDUCATION/TRAINING PROGRAM

## 2025-07-05 PROCEDURE — 84155 ASSAY OF PROTEIN SERUM: CPT | Performed by: STUDENT IN AN ORGANIZED HEALTH CARE EDUCATION/TRAINING PROGRAM

## 2025-07-05 PROCEDURE — 83735 ASSAY OF MAGNESIUM: CPT | Performed by: INTERNAL MEDICINE

## 2025-07-05 PROCEDURE — 82570 ASSAY OF URINE CREATININE: CPT | Performed by: INTERNAL MEDICINE

## 2025-07-05 PROCEDURE — 84295 ASSAY OF SERUM SODIUM: CPT | Performed by: INTERNAL MEDICINE

## 2025-07-05 PROCEDURE — 36415 COLL VENOUS BLD VENIPUNCTURE: CPT | Performed by: STUDENT IN AN ORGANIZED HEALTH CARE EDUCATION/TRAINING PROGRAM

## 2025-07-05 PROCEDURE — 36415 COLL VENOUS BLD VENIPUNCTURE: CPT | Performed by: INTERNAL MEDICINE

## 2025-07-05 RX ORDER — POTASSIUM CHLORIDE 1.5 G/1.58G
40 POWDER, FOR SOLUTION ORAL ONCE
Status: COMPLETED | OUTPATIENT
Start: 2025-07-05 | End: 2025-07-05

## 2025-07-05 RX ORDER — DEXTROSE MONOHYDRATE AND SODIUM CHLORIDE 5; .45 G/100ML; G/100ML
INJECTION, SOLUTION INTRAVENOUS CONTINUOUS
Status: DISCONTINUED | OUTPATIENT
Start: 2025-07-05 | End: 2025-07-06

## 2025-07-05 RX ORDER — POTASSIUM CHLORIDE 1500 MG/1
40 TABLET, EXTENDED RELEASE ORAL ONCE
Status: COMPLETED | OUTPATIENT
Start: 2025-07-05 | End: 2025-07-05

## 2025-07-05 RX ORDER — ALBUMIN (HUMAN) 12.5 G/50ML
12.5 SOLUTION INTRAVENOUS 3 TIMES DAILY
Status: COMPLETED | OUTPATIENT
Start: 2025-07-05 | End: 2025-07-06

## 2025-07-05 RX ADMIN — MIRTAZAPINE 15 MG: 15 TABLET, FILM COATED ORAL at 21:56

## 2025-07-05 RX ADMIN — SODIUM CHLORIDE: 0.9 INJECTION, SOLUTION INTRAVENOUS at 01:09

## 2025-07-05 RX ADMIN — POTASSIUM CHLORIDE 40 MEQ: 1.5 POWDER, FOR SOLUTION ORAL at 01:36

## 2025-07-05 RX ADMIN — DEXTROSE AND SODIUM CHLORIDE: 5; 450 INJECTION, SOLUTION INTRAVENOUS at 16:09

## 2025-07-05 RX ADMIN — ALBUMIN HUMAN 12.5 G: 0.25 SOLUTION INTRAVENOUS at 16:09

## 2025-07-05 RX ADMIN — POTASSIUM CHLORIDE 40 MEQ: 1500 TABLET, EXTENDED RELEASE ORAL at 09:47

## 2025-07-05 RX ADMIN — ALBUMIN HUMAN 12.5 G: 0.25 SOLUTION INTRAVENOUS at 21:55

## 2025-07-05 RX ADMIN — SODIUM CHLORIDE 1000 ML: 0.9 INJECTION, SOLUTION INTRAVENOUS at 10:10

## 2025-07-05 ASSESSMENT — ACTIVITIES OF DAILY LIVING (ADL)
ADLS_ACUITY_SCORE: 45
ADLS_ACUITY_SCORE: 45
ADLS_ACUITY_SCORE: 48
ADLS_ACUITY_SCORE: 45
ADLS_ACUITY_SCORE: 48
ADLS_ACUITY_SCORE: 45
ADLS_ACUITY_SCORE: 48
ADLS_ACUITY_SCORE: 45
ADLS_ACUITY_SCORE: 45
ADLS_ACUITY_SCORE: 48
ADLS_ACUITY_SCORE: 45
ADLS_ACUITY_SCORE: 48
ADLS_ACUITY_SCORE: 45
ADLS_ACUITY_SCORE: 48
ADLS_ACUITY_SCORE: 45
ADLS_ACUITY_SCORE: 45

## 2025-07-05 NOTE — PROGRESS NOTES
[facility]    Hospitalist Progress Note  Name: Zoltan Dalton    MRN: [id number]  Provider: Kristan Ayala MD  Date of Service: 07/05/2025    Assessment & Plan   Summary of Stay: Zoltan Dalton is a 60 year old male who was admitted on 6/24/2025 admitted for obstructive jaundice with metastatic malignancy hepatobiliary versus pancreatic source.  Presented with abdominal pain.  Past medical history significant for tobacco use disorder.    Metastatic malignancy - hepatobiliary vs pancreatic source  Multiple liver lesions and intrahepatic biliary ductal dilatation s/p stenting 6/26 and repositioning of stent on 7/ 2  Early satiety  40lb weight loss  Epigastric and abdominal pain  Failure to thrive  Severe malnutrition in context of acute illness  Presents with months of above symptoms. Does not have a PCP or interact with the healthcare system often. Hx of ~6 drinks/week for years.   Initial labs notable for tbili 33.4, alk phos 489, AST//140, Cr 2.24, WBC 14.5. Abs US showed moderate to severe intrahepatic biliary ductal dilatation in L hepatic lobe, common duct dilated to 12mm, and heterogenous liver with multiple indeterminate hypoechoic lesions. CT CAP with findings worrisome for liver malignancy- hepatic masses, severe L>R intrahepatic biliary dilatation; several indeterminate pulmonary nodules bilaterally, and small ascites.   Paracentesis unable to be completed 2/2 small volume ascites. AFP<1.8, CA 19-9: 31, CEA 43.1. Hepatitis panel negative.   MRCP showed massive intrahepatic ductal dilatation and findings that may represent intrahepatic obstructing cholangiocarcinoma vs pancreatic neoplasm with mets.   ERCP 6/26 showed biliary obstruction at the hilum 2/2 masses seen on EUS. 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 Underwent sphincterotomy and stenting.   EUS 6/26 with innumerable hepatic masses with obstruction of  bile duct at hilum. FNA done.  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 FNA positive for adenocarcinoma - smears and cell block show clusters of malignant glandular groups consistent with at least a moderately differentiated adenocarcinoma. Correlation with imaging and EUS/ERCP findings is recommended. Awaiting oncology recs now that FNA has resulted.   LFTs have not improved since stenting. GI planning for repeat ERCP.   - Repeat ERCP 7/2  - Follow up FNA results  - Oncology consult              - Outpatient PET scan and followup  - Start mirtazapine for appetite               - Still with little PO intake, may need to consider nutrition support if no improvement after ERCP  - Ordered palliative care consult to clarify goals.  Per oncology patient's tumor is treatable but not curable: Patient hoping for restorative care     Transaminitis : Worsening again  Likely related to above. GI stated it will take time for LFTs to improve after stenting, but it has not improved/mildly worsened.  - Repeat ERCP 7/2  - Trend LFTs  -Will re- page  GI to reassess for any further recommendations     Leukocytosis : Improving  Rising WBC but no new infectious symptoms. Also with worsening PIYUSH and LFTs. CT CAP done and showed moderate bilateral pleural effusions and atelectasis, interlobular septal thickening and groundglass attenuation in lower lobes, suggestive of edema, atelectasis, and/or pneumonitis.   Suspect leukocytosis is related to malignancy and not acute infection.  - Trend WBC    Hypernatremia (poor oral intake in setting of liver failure and third spacing)  PIYUSH with worsening renal functions  Cannot exclude hepatorenal  Cr 2.24 from unclear baseline. Likely pre-renal given improvement with IVF. Cr remains elevated at 1.4. wonder if this is his baseline.  -Creatinine up to 1.7  - Will try albumin today  - Avoid nephrotoxins  - AM BMP  -Will try albumin infusion  -Check  sodium      Pleural effusions  Possible pulmonary edema  Noted on CT 6/30. No respiratory symptoms, no hypoxia. Has been receiving IVF for PIYUSH.      Colonic mass, ruled out  Previous colonoscopy '21 unremarkable, though had 4mm tubular adenoma in transverse colon on colonoscopy '15.   Colonoscopy on 6/26 showed a normal colon.      Bilateral pulmonary nodules  Possible metastatic disease vs infectious or inflammatory.   - Follow up CT chest in 3 months pending above workup     Tobacco use disorder - daily smoker, encourage cessation        Diet: Snacks/Supplements Adult: Ensure Plus High Protein; With Meals        DVT Prophylaxis: Pneumatic Compression Devices  Code Status: Full Code    Disposition:   Medically Ready for Discharge: To be decided        Interval History   Reviewed chart, patient frail weak tired lethargic says he is trying to take p.o. discussed need for nutrition hydration and lab results.  He says he will try to take p.o. and free water every hour when awake if possible.  Discussed possibility of tube feedings which he declined.  More than 10 review of system was carried out was otherwise negative total time spent for patient care and coronation of care is more than 35minutes  -Data reviewed today: I reviewed all new labs and imaging reports over the last 24 hours. I personally reviewed no images or EKG's today.    Physical Exam   Temp: 98.5  F (36.9  C) Temp src: Oral BP: 127/60 Pulse: 97   Resp: 18 SpO2: 97 % O2 Device: None (Room air) Oxygen Delivery: 1 LPM  Vitals:    06/24/25 0628 06/24/25 1500 07/02/25 0737   Weight: 59 kg (130 lb 1.1 oz) 56.4 kg (124 lb 4.8 oz) 55.1 kg (121 lb 7.6 oz)     Vital Signs with Ranges  Temp:  [98.5  F (36.9  C)-98.8  F (37.1  C)] 98.5  F (36.9  C)  Pulse:  [84-97] 97  Resp:  [16-18] 18  BP: (110-127)/(60) 127/60  SpO2:  [96 %-98 %] 97 %  I/O last 3 completed shifts:  In: 726 [I.V.:726]  Out: 200 [Urine:200]      GEN: Awake and communicating NAD.  HEENT:   Normocephalic/atraumatic, anicteric, no nasal discharge, mouth dry  CV:  Regular rate and rhythm, no murmur or JVD.  S1 + S2 noted, no S3 or S4.  LUNGS:  Clear to auscultation bilaterally without rales/rhonchi/wheezing/retractions.  Symmetric chest rise on inhalation noted.  ABD:  Active bowel sounds, soft, non-tender/non-distended.  No rebound/guarding/rigidity.  EXT:  No edema.  No cyanosis.  No joint synovitis noted.  SKIN:  Dry to touch, no exanthems noted in the visualized areas.    Medications   Current Facility-Administered Medications   Medication Dose Route Frequency Provider Last Rate Last Admin    sodium chloride 0.9 % infusion   Intravenous Continuous Kristan Ayala MD 75 mL/hr at 07/05/25 0740 Rate Verify at 07/05/25 0740     Current Facility-Administered Medications   Medication Dose Route Frequency Provider Last Rate Last Admin    mirtazapine (REMERON) tablet 15 mg  15 mg Oral At Bedtime Jane Rhodes DO   15 mg at 07/04/25 2211    potassium chloride glenn ER (KLOR-CON M20) CR tablet 40 mEq  40 mEq Oral Once Kristan Ayala MD        senna-docusate (SENOKOT-S/PERICOLACE) 8.6-50 MG per tablet 1 tablet  1 tablet Oral BID Ham Nuñez APRN CNP   1 tablet at 07/02/25 2035    Or    senna-docusate (SENOKOT-S/PERICOLACE) 8.6-50 MG per tablet 2 tablet  2 tablet Oral BID Ham Nuñez APRN CNP   2 tablet at 07/04/25 2020    sodium chloride (PF) 0.9% PF flush 3 mL  3 mL Intracatheter Q8H [facility] Ham Nuñez APRN CNP   3 mL at 07/03/25 2114     Data     Recent Labs   Lab 07/05/25  0645 07/04/25  0702 07/03/25  0706   WBC 17.3* 17.7* 19.5*   HGB 10.3* 10.5* 10.3*   HCT 29.3* 29.3* 28.6*    98 98    209 220     Recent Labs   Lab 07/05/25  0645 07/05/25  0035 07/04/25  0702 07/03/25  0706   *  --  153* 149*   POTASSIUM 3.4  3.4 3.3* 3.4 3.6   CHLORIDE 125*  --  120* 116*   CO2 21*  --  23 22   ANIONGAP 10  --  10 11   *  --  119* 115*   BUN 47.0*  --   "48.9* 47.3*   CR 1.61*  --  1.74* 1.66*   GFRESTIMATED 48*  --  44* 47*   SANDRA 11.2*  --  11.2* 11.3*     7-Day Micro Results       No results found for the last 168 hours.          Recent Labs   Lab 07/05/25  0645 07/04/25  0702 07/03/25  0706   HGB 10.3* 10.5* 10.3*     Recent Labs   Lab 07/05/25  0645 07/04/25  0702 07/03/25  0706   * 113* 138*   * 175* 188*   ALKPHOS 339* 351* 360*   BILITOTAL 27.8* 25.6* 28.8*     No results for input(s): \"INR\" in the last 168 hours.    Recent Labs   Lab 07/03/25  0915   LACT 1.6     No results for input(s): \"LIPASE\" in the last 168 hours.  No results for input(s): \"TSH\" in the last 168 hours.  No results for input(s): \"COLOR\", \"APPEARANCE\", \"URINEGLC\", \"URINEBILI\", \"URINEKETONE\", \"SG\", \"UBLD\", \"URINEPH\", \"PROTEIN\", \"UROBILINOGEN\", \"NITRITE\", \"LEUKEST\", \"RBCU\", \"WBCU\" in the last 168 hours.    No results found for this or any previous visit (from the past 24 hours).         "

## 2025-07-05 NOTE — PROGRESS NOTES
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    - Biopsy showed adenocarcinoma, c/w cholangiocarcinoma  - Liver function tests have not improved at all since stent placement.     7/2- Repeat ERCP  - Biliary stent exchange. 15 cm x 10 [place] plastic stent was placed into the dilated segment of the  left intrahepatics. Previously placed biliary stent was removed.    - Hypercalcemia                               PLAN:  - Repeat ERCP 7/2 as above  - No change in LFT's/bili- may need PTC per GI   - Will order outpatient PET scan  - His disease is not considered curable but treatable, and the goal of treatment is to help him live longer with better quality of life.  - Standard first line treatment is gemcitabine/cisplatin/pembrolizumab, but would need to wait for his liver function tests to get better first  - patient is uninsured.  I would like him to work with  in the hospital to see if there's any temporary insurance could be set up for him.  If not and he remains uninsured, would have him see [facility] after discharge ([facility] may have some financial assistance programs for uninsured patients)  -Will check ionized calcium.  May need zoledronic acid if not given previously     Gris Morgan M.D.  [facility]  [phone]    Subjective:  Resting comfortably.  Per bedside RN no new concerns      Labs:  All labs reviewed    CBC  Recent Labs   Lab 07/05/25  0645 07/04/25  0702 07/03/25  0706   WBC 17.3* 17.7* 19.5*   HGB 10.3* 10.5* 10.3*    98 98    209 220       CMP  Recent Labs   Lab 07/05/25  0645 07/05/25  0035 07/04/25  0702 07/03/25  0706 07/03/25  0611 07/02/25  0932 07/02/25  0912   *  --  153* 149*  --   --  147*  "  POTASSIUM 3.4  3.4 3.3* 3.4 3.6  --    < > 2.9*   CHLORIDE 125*  --  120* 116*  --   --  114*   CO2 21*  --  23 22  --   --  21*   ANIONGAP 10  --  10 11  --   --  12   *  --  119* 115* 84   < > 117*   BUN 47.0*  --  48.9* 47.3*  --   --  50.0*   CR 1.61*  --  1.74* 1.66*  --   --  1.63*   GFRESTIMATED 48*  --  44* 47*  --   --  48*   SANDRA 11.2*  --  11.2* 11.3*  --   --  11.5*   MAG 2.5*  --  2.5* 2.6*  --   --  2.6*   PHOS 2.6  --  2.7 2.9  --   --  2.4*   PROTTOTAL 4.8*  --  4.4* 4.4*  --   --  4.5*   ALBUMIN 2.7*  --  2.6* 2.9*  --   --  2.8*   BILITOTAL 27.8*  --  25.6* 28.8*  --   --  31.0*   ALKPHOS 339*  --  351* 360*  --   --  351*   *  --  113* 138*  --   --  112*   *  --  175* 188*  --   --  166*    < > = values in this interval not displayed.       INR  No lab results found in last 7 days.      Blood CultureNo results for input(s): \"CULT\" in the last 168 hours.      Gris Fox MD  [facility]          "

## 2025-07-05 NOTE — PLAN OF CARE
"Goal Outcome Evaluation:      Plan of Care Reviewed With: patient    Overall Patient Progress: improvingOverall Patient Progress: improving    Outcome Evaluation: denies pain, SOB. IVF  infusing  A & O x 4, VSS, LS clear, o2 sat 94% RA, CLD, up A x 1/gb, Mg+phos protocol. Recheck am, K+ replaced,  recheck 2341. Plan: TBD.  Will continue POC and monitoring.   Problem: Adult Inpatient Plan of Care  Goal: Plan of Care Review  Description: The Plan of Care Review/Shift note should be completed every shift.  The Outcome Evaluation is a brief statement about your assessment that the patient is improving, declining, or no change.  This information will be displayed automatically on your shift  note.  Outcome: Progressing  Flowsheets (Taken 7/4/2025 2239)  Outcome Evaluation: denies pain, SOB. IVF  infusing  Plan of Care Reviewed With: patient  Overall Patient Progress: improving  Goal: Patient-Specific Goal (Individualized)  Description: You can add care plan individualizations to a care plan. Examples of Individualization might be:  \"Parent requests to be called daily at 9am for status\", \"I have a hard time hearing out of my right ear\", or \"Do not touch me to wake me up as it startles  me\".  Outcome: Progressing  Goal: Absence of Hospital-Acquired Illness or Injury  Outcome: Progressing  Intervention: Identify and Manage Fall Risk  Recent Flowsheet Documentation  Taken 7/4/2025 1605 by Leilani Cowan RN  Safety Promotion/Fall Prevention:   activity supervised   nonskid shoes/slippers when out of bed   treat underlying cause   treat reversible contributory factors  Intervention: Prevent Skin Injury  Recent Flowsheet Documentation  Taken 7/4/2025 1605 by Leilani Cowan RN  Body Position: position changed independently  Skin Protection: adhesive use limited  Intervention: Prevent and Manage VTE (Venous Thromboembolism) Risk  Recent Flowsheet Documentation  Taken 7/4/2025 1605 by Leilani Cowan RN  VTE " Prevention/Management: SCDs on (sequential compression devices)  Intervention: Prevent Infection  Recent Flowsheet Documentation  Taken 7/4/2025 1605 by Leilani Cowan RN  Infection Prevention:   cohorting utilized   rest/sleep promoted   hand hygiene promoted  Goal: Optimal Comfort and Wellbeing  Outcome: Progressing  Goal: Readiness for Transition of Care  Outcome: Progressing     Problem: Fall Injury Risk  Goal: Absence of Fall and Fall-Related Injury  Outcome: Progressing  Intervention: Identify and Manage Contributors  Recent Flowsheet Documentation  Taken 7/4/2025 1605 by Leilani Cowan RN  Medication Review/Management: medications reviewed  Intervention: Promote Injury-Free Environment  Recent Flowsheet Documentation  Taken 7/4/2025 1605 by Leilani Cowan RN  Safety Promotion/Fall Prevention:   activity supervised   nonskid shoes/slippers when out of bed   treat underlying cause   treat reversible contributory factors     Problem: Acute Kidney Injury/Impairment  Goal: Fluid and Electrolyte Balance  Outcome: Progressing  Intervention: Monitor and Manage Fluid and Electrolyte Balance  Recent Flowsheet Documentation  Taken 7/4/2025 1605 by Leilani Cowan RN  Fluid/Electrolyte Management: fluids provided  Goal: Improved Oral Intake  Outcome: Progressing  Goal: Effective Renal Function  Outcome: Progressing  Intervention: Monitor and Support Renal Function  Recent Flowsheet Documentation  Taken 7/4/2025 1605 by Leilani Cowan RN  Medication Review/Management: medications reviewed     Problem: Liver Failure  Goal: Optimal Coping with Liver Failure  Outcome: Progressing  Goal: Absence of Bleeding  Outcome: Progressing  Intervention: Monitor and Manage Coagulation  Recent Flowsheet Documentation  Taken 7/4/2025 1605 by Leilani Cowan RN  Bleeding Precautions: blood pressure closely monitored  Goal: Fluid and Electrolyte Balance  Outcome: Progressing  Intervention: Monitor and Manage Fluid and  Electrolyte Balance  Recent Flowsheet Documentation  Taken 7/4/2025 1605 by Leilani Cowan RN  Fluid/Electrolyte Management: fluids provided  Goal: Optimal Gastrointestinal Function  Outcome: Progressing  Intervention: Monitor and Support Gastrointestinal Function  Recent Flowsheet Documentation  Taken 7/4/2025 1605 by Leilani Cowan RN  Fluid/Electrolyte Management: fluids provided  Perineal Care: perineum cleansed  Goal: Blood Glucose Level Within Target Range  Outcome: Progressing  Goal: Hemodynamic Stability  Outcome: Progressing  Goal: Absence of Infection Signs and Symptoms  Outcome: Progressing  Intervention: Prevent or Manage Infection  Recent Flowsheet Documentation  Taken 7/4/2025 1605 by Leilani Cowan RN  Infection Management: aseptic technique maintained  Goal: Optimal Neurologic Function  Outcome: Progressing  Intervention: Monitor and Optimize Neurologic Status  Recent Flowsheet Documentation  Taken 7/4/2025 1605 by Leilani Cowan RN  Head of Bed (HOB) Positioning: HOB at 20-30 degrees  Goal: Improved Oral Intake  Outcome: Progressing  Goal: Optimal Pain Control, Comfort and Function  Outcome: Progressing  Intervention: Prevent or Manage Pain  Recent Flowsheet Documentation  Taken 7/4/2025 1605 by Leilani Cowan RN  Sleep/Rest Enhancement: awakenings minimized  Goal: Effective Oxygenation and Ventilation  Outcome: Progressing  Intervention: Promote Airway Secretion Clearance  Recent Flowsheet Documentation  Taken 7/4/2025 1605 by Leilani Cowan RN  Activity Management: activity adjusted per tolerance  Intervention: Optimize Oxygenation and Ventilation  Recent Flowsheet Documentation  Taken 7/4/2025 1605 by Leilani Cowan RN  Head of Bed (HOB) Positioning: HOB at 20-30 degrees

## 2025-07-05 NOTE — PROGRESS NOTES
"[facility] Progress Note       Asked to see the patient again today for persistently elevated LFTs after biliary stent placement .    IMPRESSION/RECOMMENDATIONS:  Malignant biliary obstruction status post ERCP stent placement  with minimal improvement in liver chemistries.  Very likely this is related to intrahepatic biliary dilatation not being adequately addressed with biliary stenting.  Presently, no indication for urgent ERCP.    Will follow up on Monday, review LFT trend and coordinate percutaneous drain with IR if appropriate.     Total time spent in chart review, direct medical discussion, examination, and documentation was 20 minutes    López Aguilar DO   [facility]  Office [phone]    ________________________________________________________________________      SUBJECTIVE:  Mild abd pain, not much appetite      OBJECTIVE:  /60 (BP Location: Right arm)   Pulse 97   Temp 98.5  F (36.9  C) (Oral)   Resp 18   Ht 1.727 m (5' 8\")   Wt 55.1 kg (121 lb 7.6 oz)   SpO2 97%   BMI 18.47 kg/m    Temp (24hrs), Av.7  F (37.1  C), Min:98.5  F (36.9  C), Max:98.8  F (37.1  C)    No data found.    Intake/Output Summary (Last 24 hours) at 2025 1231  Last data filed at 2025 0955  Gross per 24 hour   Intake 726 ml   Output 50 ml   Net 676 ml        PHYSICAL EXAM  GEN: Alert, oriented x3, communicative and in NAD.    ABD: Firm, non-tender to palp      Additional Data:  I have reviewed the patient's new clinical lab results:     Recent Labs   Lab Test 25  0645 25  0702 25  0706 25  0647 25  0709 25  0711 25  0642   WBC 17.3* 17.7* 19.5*   < > 12.8*   < > 14.5*   HGB 10.3* 10.5* 10.3*   < > 10.2*   < > 11.3*    98 98   < > 91   < > 91    209 220   < > 284   < > 277   INR  --   --   --   --  1.14  --  1.09    < > = values in this interval not displayed.     Recent Labs   Lab Test 25  0645 25  0035 " 07/04/25  0702 07/03/25  0706   POTASSIUM 3.4  3.4 3.3* 3.4 3.6   CHLORIDE 125*  --  120* 116*   CO2 21*  --  23 22   BUN 47.0*  --  48.9* 47.3*   ANIONGAP 10  --  10 11     Recent Labs   Lab Test 07/05/25  0645 07/04/25  0702 07/03/25  0706 06/25/25  0711 06/24/25  0833 06/24/25  0642   ALBUMIN 2.7* 2.6* 2.9*   < >  --  3.0*   BILITOTAL 27.8* 25.6* 28.8*   < >  --  33.4*   * 175* 188*   < >  --  140*   * 113* 138*   < >  --  135*   PROTEIN  --   --   --   --  10*  --    LIPASE  --   --   --   --   --  31    < > = values in this interval not displayed.

## 2025-07-05 NOTE — PLAN OF CARE
"Vss  lethargic. Ox3. Up to br with A1 belt. Unsteady. Tremulous. Jaundice. Denies pain. Ivf d/c'd. Pt encouraged to drink po fluids but often refuses. Sips of water intermittently.  Poor appetite. No meal today thus far. K 3.4 replaced. Recheck 3.8.   Problem: Adult Inpatient Plan of Care  Goal: Plan of Care Review  Description: The Plan of Care Review/Shift note should be completed every shift.  The Outcome Evaluation is a brief statement about your assessment that the patient is improving, declining, or no change.  This information will be displayed automatically on your shift  note.  Outcome: Not Progressing  Flowsheets (Taken 7/5/2025 1500)  Outcome Evaluation: letharhgic  Plan of Care Reviewed With: patient  Overall Patient Progress: no change  Goal: Patient-Specific Goal (Individualized)  Description: You can add care plan individualizations to a care plan. Examples of Individualization might be:  \"Parent requests to be called daily at 9am for status\", \"I have a hard time hearing out of my right ear\", or \"Do not touch me to wake me up as it startles  me\".  Outcome: Not Progressing  Goal: Readiness for Transition of Care  Outcome: Not Progressing     Problem: Fall Injury Risk  Goal: Absence of Fall and Fall-Related Injury  Outcome: Not Progressing  Intervention: Promote Injury-Free Environment  Recent Flowsheet Documentation  Taken 7/5/2025 0957 by Dillon Blank, RN  Safety Promotion/Fall Prevention:   activity supervised   assistive device/personal items within reach   clutter free environment maintained   increased rounding and observation   increase visualization of patient   lighting adjusted   mobility aid in reach   nonskid shoes/slippers when out of bed   patient and family education   room near nurse's station   room organization consistent   safety round/check completed  Taken 7/5/2025 0740 by Dillon Blank, RN  Safety Promotion/Fall Prevention:   safety round/check completed   nonskid " shoes/slippers when out of bed     Problem: Acute Kidney Injury/Impairment  Goal: Fluid and Electrolyte Balance  Outcome: Not Progressing  Goal: Improved Oral Intake  Outcome: Not Progressing  Goal: Effective Renal Function  Outcome: Not Progressing     Problem: Liver Failure  Goal: Optimal Coping with Liver Failure  Outcome: Not Progressing  Goal: Optimal Neurologic Function  Outcome: Not Progressing  Goal: Improved Oral Intake  Outcome: Not Progressing   Goal Outcome Evaluation:      Plan of Care Reviewed With: patient    Overall Patient Progress: no changeOverall Patient Progress: no change    Outcome Evaluation: letharhgic

## 2025-07-05 NOTE — PLAN OF CARE
"  End OF Shift Summary :    Neuro:   A&O4 lethargic easily arousable denied sob and chest pain          Cardiac:  WDL         Respiratory : lung sounds clear infrequent cough       GI &  : bowel sounds audible in all quadrants.       Skin :  jaundince appearance yellow discoloration on skin and sclera.    Mobility : refused gaitbelt when ambulated to and from bathroom steady gait       IV access :  patent iv infusing.NS running at 76mL/hr.          Goal Outcome Evaluation:      Plan of Care Reviewed With: patient    Overall Patient Progress: improvingOverall Patient Progress: improving         Problem: Adult Inpatient Plan of Care  Goal: Plan of Care Review  Description: The Plan of Care Review/Shift note should be completed every shift.  The Outcome Evaluation is a brief statement about your assessment that the patient is improving, declining, or no change.  This information will be displayed automatically on your shift  note.  Outcome: Progressing  Flowsheets (Taken 7/5/2025 0316)  Plan of Care Reviewed With: patient  Overall Patient Progress: improving  Goal: Patient-Specific Goal (Individualized)  Description: You can add care plan individualizations to a care plan. Examples of Individualization might be:  \"Parent requests to be called daily at 9am for status\", \"I have a hard time hearing out of my right ear\", or \"Do not touch me to wake me up as it startles  me\".  Outcome: Progressing  Goal: Absence of Hospital-Acquired Illness or Injury  Outcome: Progressing  Goal: Optimal Comfort and Wellbeing  Outcome: Progressing  Goal: Readiness for Transition of Care  Outcome: Progressing     Problem: Fall Injury Risk  Goal: Absence of Fall and Fall-Related Injury  Outcome: Progressing     Problem: Acute Kidney Injury/Impairment  Goal: Fluid and Electrolyte Balance  Outcome: Progressing  Goal: Improved Oral Intake  Outcome: Progressing  Goal: Effective Renal Function  Outcome: Progressing     Problem: Liver " Failure  Goal: Optimal Coping with Liver Failure  Outcome: Progressing  Goal: Absence of Bleeding  Outcome: Progressing  Goal: Fluid and Electrolyte Balance  Outcome: Progressing  Goal: Optimal Gastrointestinal Function  Outcome: Progressing  Goal: Blood Glucose Level Within Target Range  Outcome: Progressing  Goal: Hemodynamic Stability  Outcome: Progressing  Goal: Absence of Infection Signs and Symptoms  Outcome: Progressing  Goal: Optimal Neurologic Function  Outcome: Progressing  Goal: Improved Oral Intake  Outcome: Progressing  Goal: Optimal Pain Control, Comfort and Function  Outcome: Progressing  Goal: Effective Oxygenation and Ventilation  Outcome: Progressing

## 2025-07-06 LAB
ALBUMIN SERPL BCG-MCNC: 2.6 G/DL (ref 3.5–5.2)
ALP SERPL-CCNC: 278 U/L (ref 40–150)
ALT SERPL W P-5'-P-CCNC: 142 U/L (ref 0–70)
ANION GAP SERPL CALCULATED.3IONS-SCNC: 9 MMOL/L (ref 7–15)
AST SERPL W P-5'-P-CCNC: 83 U/L (ref 0–45)
BILIRUB DIRECT SERPL-MCNC: 18.25 MG/DL (ref 0–0.3)
BILIRUB SERPL-MCNC: 25 MG/DL
BUN SERPL-MCNC: 47.7 MG/DL (ref 8–23)
CA-I BLD-MCNC: 6.1 MG/DL (ref 4.4–5.2)
CALCIUM SERPL-MCNC: 10.9 MG/DL (ref 8.8–10.4)
CHLORIDE SERPL-SCNC: 127 MMOL/L (ref 98–107)
CREAT SERPL-MCNC: 1.74 MG/DL (ref 0.67–1.17)
EGFRCR SERPLBLD CKD-EPI 2021: 44 ML/MIN/1.73M2
ERYTHROCYTE [DISTWIDTH] IN BLOOD BY AUTOMATED COUNT: 26.9 % (ref 10–15)
GLUCOSE SERPL-MCNC: 158 MG/DL (ref 70–99)
HCO3 SERPL-SCNC: 20 MMOL/L (ref 22–29)
HCT VFR BLD AUTO: 25.1 % (ref 40–53)
HGB BLD-MCNC: 8.9 G/DL (ref 13.3–17.7)
MAGNESIUM SERPL-MCNC: 2.4 MG/DL (ref 1.7–2.3)
MCH RBC QN AUTO: 35.7 PG (ref 26.5–33)
MCHC RBC AUTO-ENTMCNC: 35.5 G/DL (ref 31.5–36.5)
MCV RBC AUTO: 101 FL (ref 78–100)
PHOSPHATE SERPL-MCNC: 2.1 MG/DL (ref 2.5–4.5)
PLATELET # BLD AUTO: 158 10E3/UL (ref 150–450)
POTASSIUM SERPL-SCNC: 3.2 MMOL/L (ref 3.4–5.3)
POTASSIUM SERPL-SCNC: 3.2 MMOL/L (ref 3.4–5.3)
PROT SERPL-MCNC: 4.1 G/DL (ref 6.4–8.3)
RBC # BLD AUTO: 2.49 10E6/UL (ref 4.4–5.9)
SODIUM SERPL-SCNC: 156 MMOL/L (ref 135–145)
WBC # BLD AUTO: 16 10E3/UL (ref 4–11)

## 2025-07-06 PROCEDURE — 250N000013 HC RX MED GY IP 250 OP 250 PS 637: Performed by: STUDENT IN AN ORGANIZED HEALTH CARE EDUCATION/TRAINING PROGRAM

## 2025-07-06 PROCEDURE — 258N000003 HC RX IP 258 OP 636: Performed by: INTERNAL MEDICINE

## 2025-07-06 PROCEDURE — 36415 COLL VENOUS BLD VENIPUNCTURE: CPT | Performed by: INTERNAL MEDICINE

## 2025-07-06 PROCEDURE — 250N000013 HC RX MED GY IP 250 OP 250 PS 637: Performed by: INTERNAL MEDICINE

## 2025-07-06 PROCEDURE — 84132 ASSAY OF SERUM POTASSIUM: CPT | Performed by: INTERNAL MEDICINE

## 2025-07-06 PROCEDURE — 85018 HEMOGLOBIN: CPT | Performed by: STUDENT IN AN ORGANIZED HEALTH CARE EDUCATION/TRAINING PROGRAM

## 2025-07-06 PROCEDURE — 250N000011 HC RX IP 250 OP 636: Performed by: INTERNAL MEDICINE

## 2025-07-06 PROCEDURE — P9047 ALBUMIN (HUMAN), 25%, 50ML: HCPCS | Performed by: INTERNAL MEDICINE

## 2025-07-06 PROCEDURE — 99233 SBSQ HOSP IP/OBS HIGH 50: CPT | Performed by: INTERNAL MEDICINE

## 2025-07-06 PROCEDURE — 83735 ASSAY OF MAGNESIUM: CPT | Performed by: INTERNAL MEDICINE

## 2025-07-06 PROCEDURE — 82330 ASSAY OF CALCIUM: CPT | Performed by: INTERNAL MEDICINE

## 2025-07-06 PROCEDURE — 120N000001 HC R&B MED SURG/OB

## 2025-07-06 PROCEDURE — 82248 BILIRUBIN DIRECT: CPT | Performed by: INTERNAL MEDICINE

## 2025-07-06 PROCEDURE — 84100 ASSAY OF PHOSPHORUS: CPT | Performed by: INTERNAL MEDICINE

## 2025-07-06 PROCEDURE — 82310 ASSAY OF CALCIUM: CPT | Performed by: STUDENT IN AN ORGANIZED HEALTH CARE EDUCATION/TRAINING PROGRAM

## 2025-07-06 RX ORDER — DEXTROSE MONOHYDRATE 50 MG/ML
INJECTION, SOLUTION INTRAVENOUS CONTINUOUS
Status: ACTIVE | OUTPATIENT
Start: 2025-07-06 | End: 2025-07-07

## 2025-07-06 RX ORDER — POTASSIUM CHLORIDE 1500 MG/1
40 TABLET, EXTENDED RELEASE ORAL ONCE
Status: COMPLETED | OUTPATIENT
Start: 2025-07-06 | End: 2025-07-06

## 2025-07-06 RX ORDER — POTASSIUM CHLORIDE 7.45 MG/ML
10 INJECTION INTRAVENOUS
Status: COMPLETED | OUTPATIENT
Start: 2025-07-06 | End: 2025-07-06

## 2025-07-06 RX ADMIN — POTASSIUM & SODIUM PHOSPHATES POWDER PACK 280-160-250 MG 1 PACKET: 280-160-250 PACK at 16:32

## 2025-07-06 RX ADMIN — POTASSIUM CHLORIDE 10 MEQ: 7.46 INJECTION, SOLUTION INTRAVENOUS at 19:52

## 2025-07-06 RX ADMIN — ALBUMIN HUMAN 12.5 G: 0.25 SOLUTION INTRAVENOUS at 09:47

## 2025-07-06 RX ADMIN — POTASSIUM & SODIUM PHOSPHATES POWDER PACK 280-160-250 MG 1 PACKET: 280-160-250 PACK at 09:46

## 2025-07-06 RX ADMIN — POTASSIUM & SODIUM PHOSPHATES POWDER PACK 280-160-250 MG 1 PACKET: 280-160-250 PACK at 13:12

## 2025-07-06 RX ADMIN — POTASSIUM CHLORIDE 40 MEQ: 1500 TABLET, EXTENDED RELEASE ORAL at 13:04

## 2025-07-06 RX ADMIN — POTASSIUM CHLORIDE 10 MEQ: 7.46 INJECTION, SOLUTION INTRAVENOUS at 18:50

## 2025-07-06 RX ADMIN — POTASSIUM CHLORIDE 10 MEQ: 7.46 INJECTION, SOLUTION INTRAVENOUS at 21:11

## 2025-07-06 RX ADMIN — DEXTROSE AND SODIUM CHLORIDE: 5; 450 INJECTION, SOLUTION INTRAVENOUS at 06:21

## 2025-07-06 RX ADMIN — POTASSIUM CHLORIDE 10 MEQ: 7.46 INJECTION, SOLUTION INTRAVENOUS at 22:22

## 2025-07-06 RX ADMIN — MIRTAZAPINE 15 MG: 15 TABLET, FILM COATED ORAL at 22:07

## 2025-07-06 RX ADMIN — DEXTROSE: 5 SOLUTION INTRAVENOUS at 14:00

## 2025-07-06 ASSESSMENT — ACTIVITIES OF DAILY LIVING (ADL)
ADLS_ACUITY_SCORE: 45
ADLS_ACUITY_SCORE: 46
ADLS_ACUITY_SCORE: 50
ADLS_ACUITY_SCORE: 45

## 2025-07-06 NOTE — PLAN OF CARE
"Goal Outcome Evaluation:      Plan of Care Reviewed With: patient    Overall Patient Progress: no changeOverall Patient Progress: no change    Outcome Evaluation: denies pain, IVF infusing.    A & O x 4, VSS, LS diminished, O2 sat 95% RA, IV albumen given x 2, Regular diet, no appetite, up A x 1/gb/w,, unsteady gait., skin jaundice,  PIV, infusing, K+ Mg+ phos protocol, recheck am. GI/PT following.  Plan: TBD. Will continue POC and monitoring.   Problem: Adult Inpatient Plan of Care  Goal: Plan of Care Review  Description: The Plan of Care Review/Shift note should be completed every shift.  The Outcome Evaluation is a brief statement about your assessment that the patient is improving, declining, or no change.  This information will be displayed automatically on your shift  note.  Outcome: Progressing  Flowsheets (Taken 7/5/2025 2308)  Outcome Evaluation: denies pain, IVF infusing.  Plan of Care Reviewed With: patient  Overall Patient Progress: no change  Goal: Patient-Specific Goal (Individualized)  Description: You can add care plan individualizations to a care plan. Examples of Individualization might be:  \"Parent requests to be called daily at 9am for status\", \"I have a hard time hearing out of my right ear\", or \"Do not touch me to wake me up as it startles  me\".  Outcome: Progressing  Goal: Absence of Hospital-Acquired Illness or Injury  Outcome: Progressing  Intervention: Identify and Manage Fall Risk  Recent Flowsheet Documentation  Taken 7/5/2025 1616 by Leilani Cowan RN  Safety Promotion/Fall Prevention:   activity supervised   nonskid shoes/slippers when out of bed  Intervention: Prevent Skin Injury  Recent Flowsheet Documentation  Taken 7/5/2025 1616 by Leilani Cowan RN  Body Position: position changed independently  Intervention: Prevent and Manage VTE (Venous Thromboembolism) Risk  Recent Flowsheet Documentation  Taken 7/5/2025 1616 by Leilani Cowan RN  VTE Prevention/Management: SCDs on " (sequential compression devices)  Intervention: Prevent Infection  Recent Flowsheet Documentation  Taken 7/5/2025 1616 by Leilani Cowan RN  Infection Prevention: rest/sleep promoted  Goal: Optimal Comfort and Wellbeing  Outcome: Progressing  Goal: Readiness for Transition of Care  Outcome: Progressing     Problem: Fall Injury Risk  Goal: Absence of Fall and Fall-Related Injury  Outcome: Progressing  Intervention: Identify and Manage Contributors  Recent Flowsheet Documentation  Taken 7/5/2025 1616 by Leilani Cowan RN  Medication Review/Management: medications reviewed  Intervention: Promote Injury-Free Environment  Recent Flowsheet Documentation  Taken 7/5/2025 1616 by Leilani Cowan RN  Safety Promotion/Fall Prevention:   activity supervised   nonskid shoes/slippers when out of bed     Problem: Acute Kidney Injury/Impairment  Goal: Fluid and Electrolyte Balance  Outcome: Progressing  Intervention: Monitor and Manage Fluid and Electrolyte Balance  Recent Flowsheet Documentation  Taken 7/5/2025 1616 by Leilani Cowan RN  Fluid/Electrolyte Management: fluids provided  Goal: Improved Oral Intake  Outcome: Progressing  Goal: Effective Renal Function  Outcome: Progressing  Intervention: Monitor and Support Renal Function  Recent Flowsheet Documentation  Taken 7/5/2025 1616 by Leilani Cowan RN  Medication Review/Management: medications reviewed     Problem: Liver Failure  Goal: Optimal Coping with Liver Failure  Outcome: Progressing  Goal: Absence of Bleeding  Outcome: Progressing  Intervention: Monitor and Manage Coagulation  Recent Flowsheet Documentation  Taken 7/5/2025 1616 by Leilani Cowan RN  Bleeding Precautions: blood pressure closely monitored  Goal: Fluid and Electrolyte Balance  Outcome: Progressing  Intervention: Monitor and Manage Fluid and Electrolyte Balance  Recent Flowsheet Documentation  Taken 7/5/2025 1616 by Leilani Cowan RN  Fluid/Electrolyte Management: fluids  provided  Goal: Optimal Gastrointestinal Function  Outcome: Progressing  Intervention: Monitor and Support Gastrointestinal Function  Recent Flowsheet Documentation  Taken 7/5/2025 1616 by Leilani Cowan RN  Fluid/Electrolyte Management: fluids provided  Goal: Blood Glucose Level Within Target Range  Outcome: Progressing  Goal: Hemodynamic Stability  Outcome: Progressing  Goal: Absence of Infection Signs and Symptoms  Outcome: Progressing  Intervention: Prevent or Manage Infection  Recent Flowsheet Documentation  Taken 7/5/2025 1616 by Leilani Cowan RN  Infection Management: aseptic technique maintained  Goal: Optimal Neurologic Function  Outcome: Progressing  Intervention: Monitor and Optimize Neurologic Status  Recent Flowsheet Documentation  Taken 7/5/2025 1616 by Leilani Cowan RN  Hepatic Encephalopathy Management: airway protection maintained  Head of Bed (HOB) Positioning: HOB at 20-30 degrees  Goal: Improved Oral Intake  Outcome: Progressing  Goal: Optimal Pain Control, Comfort and Function  Outcome: Progressing  Intervention: Prevent or Manage Pain  Recent Flowsheet Documentation  Taken 7/5/2025 1616 by Leilani Cowan RN  Sleep/Rest Enhancement: awakenings minimized  Goal: Effective Oxygenation and Ventilation  Outcome: Progressing  Intervention: Promote Airway Secretion Clearance  Recent Flowsheet Documentation  Taken 7/5/2025 1616 by Leilani Cowan RN  Cough And Deep Breathing: done independently per patient  Activity Management: activity adjusted per tolerance  Intervention: Optimize Oxygenation and Ventilation  Recent Flowsheet Documentation  Taken 7/5/2025 1616 by Leilani Cowan RN  Head of Bed (HOB) Positioning: HOB at 20-30 degrees

## 2025-07-06 NOTE — PROGRESS NOTES
[facility]    Hospitalist Progress Note  Name: Zoltan Dalton    MRN: [id number]  Provider: Akash Vazquez MD    Date of Service: 07/06/2025    Assessment & Plan   Summary of Stay: Zoltan Dalton is a 60 year old male who was admitted on 6/24/2025 admitted for obstructive jaundice with metastatic malignancy hepatobiliary versus pancreatic source.  Presented with abdominal pain.  Past medical history significant for tobacco use disorder.    He does not interact with the healthcare system and does not have a primary care doctor or health insurance.  Here upon arrival he was found to have bilirubin of 33.4 with alkaline phosphatase of 489, AST of 135 and ALT of 140.  Creatinine was 2.24 with white blood count elevated at 14.5.  Abdominal ultrasound showed moderate to severe intrahepatic biliary ductal dilatation as well as common duct dilated to 12 mm and heterogenous liver.  CT showed concern for hepatic masses and pulmonary nodules as well as small ascites.    Due to relative paucity of ascites ultrasound guided paracentesis could not be completed.  CEA was elevated at 43.1, CA 19-9 was 31 and AFP was less than 1.8.  Hepatitis panel was negative.  MRCP showed massive intrahepatic ductal dilatation with concern for intrahepatic obstructing cholangiocarcinoma versus pancreatic neoplasm with mets.    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  -- ERCP repeated 7/2 follow-up FNA results.,   -- Oncology consulted, ideally would have outpatient PET scan and follow-up but of course insurance will be a barrier  -- Palliative care consulted.  Tumor is treatable but not curable.  Patient wants to continue restorative cares for now.  -- Very poor appetite, malnourished.  Started Remeron.     Transaminitis :   Likely related to above. GI stated it will take time for LFTs to improve after stenting, but it has not improved/mildly worsened.  - Repeat ERCP 7/2  - Continue to trend, GI following.     Leukocytosis : Slowly improving  --CT CAP done and showed moderate bilateral pleural effusions and atelectasis, interlobular septal thickening and groundglass attenuation in lower lobes, suggestive of edema, atelectasis, and/or pneumonitis.   --Suspect leukocytosis is related to malignancy and not acute infection.  - Trend WBC, low threshold to culture or start antibiotics if febrile.    Hypernatremia (poor oral intake in setting of liver failure and third spacing)  PIYUSH with worsening renal functions  Cannot exclude hepatorenal  Cr 2.24 from unclear baseline. Likely pre-renal given improvement with IVF. Cr remains elevated but overall improved. wonder if this is his baseline.  -Creatinine overall stable around 1.7.  - Avoid nephrotoxins  - AM BMP  - Was given an albumin challenge without much change.  -Giving 100 mL/h D5W over 20 hours, recheck sodium in the morning.     Pleural effusions  Possible pulmonary edema  Noted on CT 6/30. No respiratory symptoms, no hypoxia. Has been receiving IVF for PIYUSH.      Colonic mass, ruled out  Previous colonoscopy '21 unremarkable, though had 4mm tubular adenoma in transverse colon on colonoscopy '15.   Colonoscopy on 6/26 showed a normal colon.      Bilateral pulmonary nodules  Possible metastatic disease  vs infectious or inflammatory.   - Follow up CT chest in 3 months pending above workup     Tobacco use disorder - daily smoker, encourage cessation        Diet: Snacks/Supplements Adult: Ensure Plus High Protein; With Meals        DVT Prophylaxis: Pneumatic Compression Devices  Code Status: Full Code    Disposition:   Medically Ready for Discharge: To be decided        Interval History     I assumed care  Adjusting IV fluids as above for hyponatremia  Appetite remains poor, encouraging oral intake and supplements  Discussed with social work, they have made a referral to financial counseling  Prognosis guarded    -Data reviewed today: I reviewed all new labs and imaging reports over the last 24 hours. I personally reviewed no images or EKG's today.    Physical Exam   Temp: 97.4  F (36.3  C) Temp src: Axillary BP: 132/61 Pulse: 87   Resp: 20 SpO2: 98 % O2 Device: None (Room air)    Vitals:    06/24/25 0628 06/24/25 1500 07/02/25 0737   Weight: 59 kg (130 lb 1.1 oz) 56.4 kg (124 lb 4.8 oz) 55.1 kg (121 lb 7.6 oz)     Vital Signs with Ranges  Temp:  [97.4  F (36.3  C)-98.8  F (37.1  C)] 97.4  F (36.3  C)  Pulse:  [73-93] 87  Resp:  [18-22] 20  BP: (120-138)/(53-68) 132/61  SpO2:  [96 %-98 %] 98 %  I/O last 3 completed shifts:  In: 590 [P.O.:240; I.V.:350]  Out: 50 [Urine:50]      GEN: Awake and communicating NAD.  HEENT:  Normocephalic/atraumatic, anicteric, no nasal discharge, mouth dry  CV:  Regular rate and rhythm, no murmur or JVD.  S1 + S2 noted, no S3 or S4.  LUNGS:  Clear to auscultation bilaterally without rales/rhonchi/wheezing/retractions.  Symmetric chest rise on inhalation noted.  ABD:  Active bowel sounds, soft, non-tender/non-distended.  No rebound/guarding/rigidity.  EXT:  No edema.  No cyanosis.  No joint synovitis noted.  SKIN:  Dry to touch, no exanthems noted in the visualized areas.    Medications   Current Facility-Administered Medications   Medication Dose Route Frequency Provider Last Rate Last  Admin    dextrose 5% and 0.45% NaCl infusion   Intravenous Continuous Kristan Ayala MD 75 mL/hr at 07/06/25 0748 Rate Verify at 07/06/25 0748     Current Facility-Administered Medications   Medication Dose Route Frequency Provider Last Rate Last Admin    mirtazapine (REMERON) tablet 15 mg  15 mg Oral At Bedtime Jane Rhodes DO   15 mg at 07/05/25 2156    potassium & sodium phosphates (NEUTRA-PHOS) Packet 1 packet  1 packet Oral or Feeding Tube Q4H Akash Vazquez MD   1 packet at 07/06/25 0946    potassium chloride glenn ER (KLOR-CON M20) CR tablet 40 mEq  40 mEq Oral Once Akash Vazquez MD        senna-docusate (SENOKOT-S/PERICOLACE) 8.6-50 MG per tablet 1 tablet  1 tablet Oral BID Ham Nuñez APRN CNP   1 tablet at 07/02/25 2035    Or    senna-docusate (SENOKOT-S/PERICOLACE) 8.6-50 MG per tablet 2 tablet  2 tablet Oral BID Ham Nuñez APRN CNP   2 tablet at 07/04/25 2020    sodium chloride (PF) 0.9% PF flush 3 mL  3 mL Intracatheter Q8H [facility] Ham Nuñez APRN CNP   3 mL at 07/03/25 2114     Data     Recent Labs   Lab 07/06/25  0658 07/05/25  0645 07/04/25  0702   WBC 16.0* 17.3* 17.7*   HGB 8.9* 10.3* 10.5*   HCT 25.1* 29.3* 29.3*   * 100 98    209 209     Recent Labs   Lab 07/06/25  0658 07/05/25  2156 07/05/25  1812 07/05/25  1301 07/05/25  1101 07/05/25  0645 07/05/25  0035 07/04/25  0702   * 156* 157* 156*   < > 156*  --  153*   POTASSIUM 3.2*  --   --  3.8  --  3.4  3.4   < > 3.4   CHLORIDE 127*  --   --   --   --  125*  --  120*   CO2 20*  --   --   --   --  21*  --  23   ANIONGAP 9  --   --   --   --  10  --  10   *  --   --   --   --  120*  --  119*   BUN 47.7*  --   --   --   --  47.0*  --  48.9*   CR 1.74*  --   --   --   --  1.61*  --  1.74*   GFRESTIMATED 44*  --   --   --   --  48*  --  44*   SANDRA 10.9*  --   --   --   --  11.2*  --  11.2*    < > = values in this interval not displayed.     7-Day Micro  "Results       No results found for the last 168 hours.          Recent Labs   Lab 07/06/25  0658 07/05/25  0645 07/04/25  0702   HGB 8.9* 10.3* 10.5*     Recent Labs   Lab 07/06/25  0658 07/05/25  0645 07/04/25  0702   AST 83* 104* 113*   * 174* 175*   ALKPHOS 278* 339* 351*   BILITOTAL 25.0* 27.8* 25.6*     No results for input(s): \"INR\" in the last 168 hours.    Recent Labs   Lab 07/03/25  0915   LACT 1.6     No results for input(s): \"LIPASE\" in the last 168 hours.  No results for input(s): \"TSH\" in the last 168 hours.  No results for input(s): \"COLOR\", \"APPEARANCE\", \"URINEGLC\", \"URINEBILI\", \"URINEKETONE\", \"SG\", \"UBLD\", \"URINEPH\", \"PROTEIN\", \"UROBILINOGEN\", \"NITRITE\", \"LEUKEST\", \"RBCU\", \"WBCU\" in the last 168 hours.    No results found for this or any previous visit (from the past 24 hours).         "

## 2025-07-06 NOTE — PLAN OF CARE
"Shift Summary 2300 - 0700   Vital signs:  Temp: 98.7  F (37.1  C) Temp src: Oral BP: 133/68 Pulse: 93   Resp: 18 SpO2: 96 % O2 Device: None (Room air) Oxygen Delivery: 1 LPM     Pt alert and oriented x4, lethargic. Denies pain. VSS, required 1-2LPM O2 via NC at night. On dextrose 5% and 0.45% NaCl infusion at 75ml/hr, has decreased oral intake - on Mirtazapine. LS diminished. Up w assist of 1 w GB and walker, a bit weak and unsteady on his feet. On IV albumin 3 times a day. K/Mg protocols, for AM draw. Hem/Onc, PT/OT, Palliative and [facility] folowing.       Problem: Adult Inpatient Plan of Care  Goal: Plan of Care Review  Description: The Plan of Care Review/Shift note should be completed every shift.  The Outcome Evaluation is a brief statement about your assessment that the patient is improving, declining, or no change.  This information will be displayed automatically on your shift  note.  Outcome: Not Progressing  Flowsheets (Taken 7/6/2025 0306)  Outcome Evaluation: Pt alert and oriented. Denies pain, On D51/2NS infusing. Remains lethargic and somnolent.  Plan of Care Reviewed With: patient  Overall Patient Progress: no change     Problem: Adult Inpatient Plan of Care  Goal: Patient-Specific Goal (Individualized)  Description: You can add care plan individualizations to a care plan. Examples of Individualization might be:  \"Parent requests to be called daily at 9am for status\", \"I have a hard time hearing out of my right ear\", or \"Do not touch me to wake me up as it startles  me\".  Outcome: Progressing  Goal: Absence of Hospital-Acquired Illness or Injury  Outcome: Progressing  Intervention: Identify and Manage Fall Risk  Recent Flowsheet Documentation  Taken 7/5/2025 1914 by Jacinda Roca RN  Safety Promotion/Fall Prevention:   safety round/check completed   room near nurse's station   clutter free environment maintained   lighting adjusted   mobility aid in reach   nonskid shoes/slippers when out of " bed  Intervention: Prevent Skin Injury  Recent Flowsheet Documentation  Taken 7/5/2025 2356 by Jacinda Roca RN  Body Position: position changed independently  Intervention: Prevent Infection  Recent Flowsheet Documentation  Taken 7/5/2025 2356 by Jacinda Roca RN  Infection Prevention:   rest/sleep promoted   single patient room provided   hand hygiene promoted  Goal: Optimal Comfort and Wellbeing  Outcome: Progressing  Goal: Readiness for Transition of Care  Outcome: Progressing     Problem: Fall Injury Risk  Goal: Absence of Fall and Fall-Related Injury  Outcome: Progressing  Intervention: Identify and Manage Contributors  Recent Flowsheet Documentation  Taken 7/5/2025 2356 by Jacinda Roca RN  Medication Review/Management: medications reviewed  Intervention: Promote Injury-Free Environment  Recent Flowsheet Documentation  Taken 7/5/2025 2356 by Jacinda Roca RN  Safety Promotion/Fall Prevention:   safety round/check completed   room near nurse's station   clutter free environment maintained   lighting adjusted   mobility aid in reach   nonskid shoes/slippers when out of bed     Problem: Acute Kidney Injury/Impairment  Goal: Fluid and Electrolyte Balance  Outcome: Progressing  Intervention: Monitor and Manage Fluid and Electrolyte Balance  Recent Flowsheet Documentation  Taken 7/5/2025 2356 by Jacinda Roca RN  Fluid/Electrolyte Management: fluids provided  Goal: Improved Oral Intake  Outcome: Progressing  Goal: Effective Renal Function  Outcome: Progressing  Intervention: Monitor and Support Renal Function  Recent Flowsheet Documentation  Taken 7/5/2025 2356 by Jacinda Roca RN  Medication Review/Management: medications reviewed     Problem: Liver Failure  Goal: Optimal Coping with Liver Failure  Outcome: Progressing  Goal: Absence of Bleeding  Outcome: Progressing  Intervention: Monitor and Manage Coagulation  Recent Flowsheet Documentation  Taken 7/5/2025 2356 by Jacinda Roca  ALEX CUNHA  Bleeding Precautions: blood pressure closely monitored  Goal: Fluid and Electrolyte Balance  Outcome: Progressing  Intervention: Monitor and Manage Fluid and Electrolyte Balance  Recent Flowsheet Documentation  Taken 7/5/2025 2356 by Jacinda Roca RN  Fluid/Electrolyte Management: fluids provided  Goal: Optimal Gastrointestinal Function  Outcome: Progressing  Intervention: Monitor and Support Gastrointestinal Function  Recent Flowsheet Documentation  Taken 7/5/2025 2356 by Jacinda Roca RN  Fluid/Electrolyte Management: fluids provided  Goal: Blood Glucose Level Within Target Range  Outcome: Progressing  Goal: Hemodynamic Stability  Outcome: Progressing  Goal: Absence of Infection Signs and Symptoms  Outcome: Progressing  Goal: Optimal Neurologic Function  Outcome: Progressing  Intervention: Monitor and Optimize Neurologic Status  Recent Flowsheet Documentation  Taken 7/5/2025 2356 by Jacinda Roca RN  Head of Bed ([facility]) Positioning: HOB at 20-30 degrees  Goal: Improved Oral Intake  Outcome: Progressing  Goal: Optimal Pain Control, Comfort and Function  Outcome: Progressing  Intervention: Prevent or Manage Pain  Recent Flowsheet Documentation  Taken 7/5/2025 2356 by Jacinda Roca RN  Sleep/Rest Enhancement:   awakenings minimized   noise level reduced   regular sleep/rest pattern promoted   room darkened  Goal: Effective Oxygenation and Ventilation  Outcome: Progressing  Intervention: Promote Airway Secretion Clearance  Recent Flowsheet Documentation  Taken 7/5/2025 2356 by Jacinda Roca RN  Cough And Deep Breathing: done independently per patient  Intervention: Optimize Oxygenation and Ventilation  Recent Flowsheet Documentation  Taken 7/5/2025 2356 by Jacinda Roca RN  Head of Bed ([facility]) Positioning: HOB at 20-30 degrees     Problem: Gas Exchange Impaired  Goal: Optimal Gas Exchange  Outcome: Progressing  Intervention: Optimize Oxygenation and Ventilation  Recent Flowsheet  Documentation  Taken 7/5/2025 3486 by Jacinda Roca, RN  Head of Bed (HOB) Positioning: HOB at 20-30 degrees       Goal Outcome Evaluation:      Plan of Care Reviewed With: patient    Overall Patient Progress: no changeOverall Patient Progress: no change    Outcome Evaluation: Pt alert and oriented. Denies pain, On D51/2NS infusing. Remains lethargic and somnolent.

## 2025-07-06 NOTE — PROGRESS NOTES
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    - Biopsy showed adenocarcinoma, c/w cholangiocarcinoma  - Liver function tests have not improved at all since stent placement.     7/2- Repeat ERCP  - Biliary stent exchange. 15 cm x 10 [place] plastic stent was placed into the dilated segment of the  left intrahepatics. Previously placed biliary stent was removed.    - Hypercalcemia - calcium 10.9 with ionized calcium 6.1                               PLAN:  - Repeat ERCP 7/2 as above  - No change in LFT's/bili- may need PTC per GI   - Will order outpatient PET scan  - His disease is not considered curable but treatable, and the goal of treatment is to help him live longer with better quality of life.  - Standard first line treatment is gemcitabine/cisplatin/pembrolizumab, but would need to wait for his liver function tests to get better first  - patient is uninsured.  Per - would like him to work with  in the hospital to see if there's any temporary insurance could be set up for him.  If not and he remains uninsured, would have him see [facility] after discharge ([facility] may have some financial assistance programs for uninsured patients)  - Reviewed calcium labs, calcium slightly lower than previous and borderline elevated ionized calcium.  Monitor for now.  If becomes symptomatic, May need zoledronic acid      Gris Morgan M.D.  [facility]  [phone]    Subjective:  Resting comfortably.  Per bedside RN no new concerns      Labs:  All labs reviewed    CBC  Recent Labs   Lab 07/06/25  0658 07/05/25  0645 07/04/25  0702   WBC 16.0* 17.3* 17.7*   HGB 8.9* 10.3* 10.5*   * 100 98    209 209       CMP  Recent Labs   Lab  "07/06/25  0658 07/05/25  2156 07/05/25  1812 07/05/25  1301 07/05/25  1101 07/05/25  0645 07/05/25  0035 07/04/25  0702 07/03/25  0706   * 156* 157* 156*   < > 156*  --  153* 149*   POTASSIUM 3.2*  --   --  3.8  --  3.4  3.4 3.3* 3.4 3.6   CHLORIDE 127*  --   --   --   --  125*  --  120* 116*   CO2 20*  --   --   --   --  21*  --  23 22   ANIONGAP 9  --   --   --   --  10  --  10 11   *  --   --   --   --  120*  --  119* 115*   BUN 47.7*  --   --   --   --  47.0*  --  48.9* 47.3*   CR 1.74*  --   --   --   --  1.61*  --  1.74* 1.66*   GFRESTIMATED 44*  --   --   --   --  48*  --  44* 47*   SANDRA 10.9*  --   --   --   --  11.2*  --  11.2* 11.3*   MAG 2.4*  --   --   --   --  2.5*  --  2.5* 2.6*   PHOS 2.1*  --   --   --   --  2.6  --  2.7 2.9   PROTTOTAL 4.1*  --   --   --   --  4.8*  --  4.4* 4.4*   ALBUMIN 2.6*  --   --   --   --  2.7*  --  2.6* 2.9*   BILITOTAL 25.0*  --   --   --   --  27.8*  --  25.6* 28.8*   ALKPHOS 278*  --   --   --   --  339*  --  351* 360*   AST 83*  --   --   --   --  104*  --  113* 138*   *  --   --   --   --  174*  --  175* 188*    < > = values in this interval not displayed.       INR  No lab results found in last 7 days.      Blood CultureNo results for input(s): \"CULT\" in the last 168 hours.      Gris Fox MD  [facility]          "

## 2025-07-06 NOTE — PLAN OF CARE
"Vss Ox4 lethargic. Sleeping in between bathroom. Incont at times. Up to chair for a an hour or so this am with encouragement. Poor appetite. Need lots of encouragement to drink fluids and take k and phos supplement. Standard tray was ordered but pt refused . Edu given to pt on importance of nutrition by mouth. Pt verbalizes understanding and then falls asleep stating he will later. Denies pain. Denies nausea. D5 ivf infusing at 100 ml/hr na 156  Problem: Adult Inpatient Plan of Care  Goal: Plan of Care Review  Description: The Plan of Care Review/Shift note should be completed every shift.  The Outcome Evaluation is a brief statement about your assessment that the patient is improving, declining, or no change.  This information will be displayed automatically on your shift  note.  Outcome: Not Progressing  Flowsheets (Taken 7/6/2025 7187)  Outcome Evaluation: lethargic  Plan of Care Reviewed With: patient  Overall Patient Progress: no change  Goal: Patient-Specific Goal (Individualized)  Description: You can add care plan individualizations to a care plan. Examples of Individualization might be:  \"Parent requests to be called daily at 9am for status\", \"I have a hard time hearing out of my right ear\", or \"Do not touch me to wake me up as it startles  me\".  Outcome: Not Progressing  Goal: Optimal Comfort and Wellbeing  Outcome: Not Progressing  Goal: Readiness for Transition of Care  Outcome: Not Progressing     Problem: Fall Injury Risk  Goal: Absence of Fall and Fall-Related Injury  Outcome: Not Progressing  Intervention: Promote Injury-Free Environment  Recent Flowsheet Documentation  Taken 7/6/2025 1318 by Dillon Blank RN  Safety Promotion/Fall Prevention:   activity supervised   assistive device/personal items within reach   clutter free environment maintained   increased rounding and observation   increase visualization of patient   lighting adjusted   mobility aid in reach   nonskid shoes/slippers when " out of bed   patient and family education   room near nurse's station   room organization consistent   safety round/check completed  Taken 7/6/2025 1039 by Dillon Blank RN  Safety Promotion/Fall Prevention:   activity supervised   assistive device/personal items within reach   clutter free environment maintained   increased rounding and observation   increase visualization of patient   mobility aid in reach   lighting adjusted   nonskid shoes/slippers when out of bed   patient and family education   room organization consistent   safety round/check completed  Taken 7/6/2025 0955 by Dillon Blank RN  Safety Promotion/Fall Prevention:   activity supervised   assistive device/personal items within reach   increase visualization of patient   lighting adjusted   mobility aid in reach   nonskid shoes/slippers when out of bed   patient and family education   room organization consistent   safety round/check completed  Taken 7/6/2025 0748 by Dillon Blank RN  Safety Promotion/Fall Prevention:   activity supervised   assistive device/personal items within reach   clutter free environment maintained   lighting adjusted   nonskid shoes/slippers when out of bed   patient and family education   room organization consistent   safety round/check completed     Problem: Acute Kidney Injury/Impairment  Goal: Fluid and Electrolyte Balance  Outcome: Not Progressing  Intervention: Monitor and Manage Fluid and Electrolyte Balance  Recent Flowsheet Documentation  Taken 7/6/2025 1039 by Dillon Blank RN  Fluid/Electrolyte Management:   fluids provided   electrolyte supplement adjusted  Goal: Improved Oral Intake  Outcome: Not Progressing  Goal: Effective Renal Function  Outcome: Not Progressing     Problem: Liver Failure  Goal: Optimal Coping with Liver Failure  Outcome: Not Progressing  Goal: Fluid and Electrolyte Balance  Outcome: Not Progressing  Intervention: Monitor and Manage Fluid and Electrolyte Balance  Recent  Flowsheet Documentation  Taken 7/6/2025 1039 by Dillon Blank RN  Fluid/Electrolyte Management:   fluids provided   electrolyte supplement adjusted  Goal: Optimal Gastrointestinal Function  Outcome: Not Progressing  Intervention: Monitor and Support Gastrointestinal Function  Recent Flowsheet Documentation  Taken 7/6/2025 1039 by Dillon Blank RN  Fluid/Electrolyte Management:   fluids provided   electrolyte supplement adjusted  Goal: Optimal Neurologic Function  Outcome: Not Progressing  Goal: Improved Oral Intake  Outcome: Not Progressing   Goal Outcome Evaluation:      Plan of Care Reviewed With: patient    Overall Patient Progress: no changeOverall Patient Progress: no change    Outcome Evaluation: lethargic

## 2025-07-07 LAB
ALBUMIN SERPL BCG-MCNC: 2.6 G/DL (ref 3.5–5.2)
ALP SERPL-CCNC: 273 U/L (ref 40–150)
ALT SERPL W P-5'-P-CCNC: 141 U/L (ref 0–70)
ANION GAP SERPL CALCULATED.3IONS-SCNC: 10 MMOL/L (ref 7–15)
AST SERPL W P-5'-P-CCNC: 82 U/L (ref 0–45)
BILIRUB SERPL-MCNC: 24.6 MG/DL
BUN SERPL-MCNC: 41.6 MG/DL (ref 8–23)
CALCIUM SERPL-MCNC: 10.8 MG/DL (ref 8.8–10.4)
CHLORIDE SERPL-SCNC: 126 MMOL/L (ref 98–107)
CREAT SERPL-MCNC: 1.56 MG/DL (ref 0.67–1.17)
EGFRCR SERPLBLD CKD-EPI 2021: 50 ML/MIN/1.73M2
ERYTHROCYTE [DISTWIDTH] IN BLOOD BY AUTOMATED COUNT: 26.7 % (ref 10–15)
GLUCOSE SERPL-MCNC: 151 MG/DL (ref 70–99)
HCO3 SERPL-SCNC: 20 MMOL/L (ref 22–29)
HCT VFR BLD AUTO: 25.4 % (ref 40–53)
HGB BLD-MCNC: 9 G/DL (ref 13.3–17.7)
MAGNESIUM SERPL-MCNC: 2.3 MG/DL (ref 1.7–2.3)
MCH RBC QN AUTO: 35.7 PG (ref 26.5–33)
MCHC RBC AUTO-ENTMCNC: 35.4 G/DL (ref 31.5–36.5)
MCV RBC AUTO: 101 FL (ref 78–100)
PHOSPHATE SERPL-MCNC: 1.8 MG/DL (ref 2.5–4.5)
PHOSPHATE SERPL-MCNC: 2.5 MG/DL (ref 2.5–4.5)
PLATELET # BLD AUTO: 144 10E3/UL (ref 150–450)
POTASSIUM SERPL-SCNC: 3.4 MMOL/L (ref 3.4–5.3)
POTASSIUM SERPL-SCNC: 3.7 MMOL/L (ref 3.4–5.3)
POTASSIUM SERPL-SCNC: 3.7 MMOL/L (ref 3.4–5.3)
PROT SERPL-MCNC: 4.3 G/DL (ref 6.4–8.3)
RBC # BLD AUTO: 2.52 10E6/UL (ref 4.4–5.9)
SODIUM SERPL-SCNC: 156 MMOL/L (ref 135–145)
WBC # BLD AUTO: 15.9 10E3/UL (ref 4–11)

## 2025-07-07 PROCEDURE — 84100 ASSAY OF PHOSPHORUS: CPT | Performed by: INTERNAL MEDICINE

## 2025-07-07 PROCEDURE — 36415 COLL VENOUS BLD VENIPUNCTURE: CPT | Performed by: INTERNAL MEDICINE

## 2025-07-07 PROCEDURE — 250N000011 HC RX IP 250 OP 636: Performed by: INTERNAL MEDICINE

## 2025-07-07 PROCEDURE — 82310 ASSAY OF CALCIUM: CPT | Performed by: STUDENT IN AN ORGANIZED HEALTH CARE EDUCATION/TRAINING PROGRAM

## 2025-07-07 PROCEDURE — 250N000009 HC RX 250: Performed by: INTERNAL MEDICINE

## 2025-07-07 PROCEDURE — 99233 SBSQ HOSP IP/OBS HIGH 50: CPT | Performed by: INTERNAL MEDICINE

## 2025-07-07 PROCEDURE — 83735 ASSAY OF MAGNESIUM: CPT | Performed by: INTERNAL MEDICINE

## 2025-07-07 PROCEDURE — 84295 ASSAY OF SERUM SODIUM: CPT | Performed by: INTERNAL MEDICINE

## 2025-07-07 PROCEDURE — 258N000003 HC RX IP 258 OP 636: Performed by: INTERNAL MEDICINE

## 2025-07-07 PROCEDURE — 84132 ASSAY OF SERUM POTASSIUM: CPT | Performed by: INTERNAL MEDICINE

## 2025-07-07 PROCEDURE — 250N000013 HC RX MED GY IP 250 OP 250 PS 637: Performed by: INTERNAL MEDICINE

## 2025-07-07 PROCEDURE — 36415 COLL VENOUS BLD VENIPUNCTURE: CPT | Performed by: STUDENT IN AN ORGANIZED HEALTH CARE EDUCATION/TRAINING PROGRAM

## 2025-07-07 PROCEDURE — 250N000013 HC RX MED GY IP 250 OP 250 PS 637: Performed by: STUDENT IN AN ORGANIZED HEALTH CARE EDUCATION/TRAINING PROGRAM

## 2025-07-07 PROCEDURE — 85018 HEMOGLOBIN: CPT | Performed by: STUDENT IN AN ORGANIZED HEALTH CARE EDUCATION/TRAINING PROGRAM

## 2025-07-07 PROCEDURE — 120N000001 HC R&B MED SURG/OB

## 2025-07-07 RX ORDER — DEXTROSE MONOHYDRATE 50 MG/ML
INJECTION, SOLUTION INTRAVENOUS CONTINUOUS
Status: DISCONTINUED | OUTPATIENT
Start: 2025-07-07 | End: 2025-07-09

## 2025-07-07 RX ORDER — DEXTROSE MONOHYDRATE 50 MG/ML
INJECTION, SOLUTION INTRAVENOUS CONTINUOUS
Status: DISCONTINUED | OUTPATIENT
Start: 2025-07-07 | End: 2025-07-07

## 2025-07-07 RX ORDER — POTASSIUM CHLORIDE 1500 MG/1
40 TABLET, EXTENDED RELEASE ORAL ONCE
Status: COMPLETED | OUTPATIENT
Start: 2025-07-07 | End: 2025-07-07

## 2025-07-07 RX ORDER — HEPARIN SODIUM 5000 [USP'U]/.5ML
5000 INJECTION, SOLUTION INTRAVENOUS; SUBCUTANEOUS EVERY 12 HOURS
Status: DISCONTINUED | OUTPATIENT
Start: 2025-07-07 | End: 2025-07-09

## 2025-07-07 RX ORDER — POTASSIUM CHLORIDE 1.5 G/1.58G
40 POWDER, FOR SOLUTION ORAL ONCE
Status: COMPLETED | OUTPATIENT
Start: 2025-07-07 | End: 2025-07-07

## 2025-07-07 RX ADMIN — DEXTROSE MONOHYDRATE: 50 INJECTION, SOLUTION INTRAVENOUS at 17:37

## 2025-07-07 RX ADMIN — MIRTAZAPINE 15 MG: 15 TABLET, FILM COATED ORAL at 22:09

## 2025-07-07 RX ADMIN — POTASSIUM CHLORIDE 40 MEQ: 1.5 POWDER, FOR SOLUTION ORAL at 03:35

## 2025-07-07 RX ADMIN — SODIUM PHOSPHATE, MONOBASIC, MONOHYDRATE AND SODIUM PHOSPHATE, DIBASIC, ANHYDROUS 15 MMOL: 142; 276 INJECTION, SOLUTION INTRAVENOUS at 11:32

## 2025-07-07 RX ADMIN — DEXTROSE 100 ML/HR: 5 SOLUTION INTRAVENOUS at 04:39

## 2025-07-07 RX ADMIN — HEPARIN SODIUM 5000 UNITS: 5000 INJECTION, SOLUTION INTRAVENOUS; SUBCUTANEOUS at 17:45

## 2025-07-07 ASSESSMENT — ACTIVITIES OF DAILY LIVING (ADL)
ADLS_ACUITY_SCORE: 50
ADLS_ACUITY_SCORE: 47
ADLS_ACUITY_SCORE: 45
ADLS_ACUITY_SCORE: 47
ADLS_ACUITY_SCORE: 45
ADLS_ACUITY_SCORE: 47
ADLS_ACUITY_SCORE: 47
ADLS_ACUITY_SCORE: 50
ADLS_ACUITY_SCORE: 47
ADLS_ACUITY_SCORE: 45
ADLS_ACUITY_SCORE: 50
ADLS_ACUITY_SCORE: 47
ADLS_ACUITY_SCORE: 45
ADLS_ACUITY_SCORE: 50

## 2025-07-07 NOTE — PROGRESS NOTES
Spoke with IR, Dr. Sneed.  He does not feel that a single percutaneous drain would provide benefit as he has multiple areas that are obstructed.  It would be complicated procedure and suggested that the [facility] could review for treatment options if patient and family desire that.  Oncology is not certain that this will help alleviate his current symptoms.

## 2025-07-07 NOTE — PROGRESS NOTES
Oncology/Hematology Follow Up Note:    Assessment and Plan:  #1 Metastatic cholangiocarcinoma  - with liver and possible lung mets  - s/p stent placement for biliary obstruction  - CA 19-9 WNL, but CEA elevated to 43  - Pancreatic tail mass noted on MRI but nothing visualized during EUS.    - Biopsy showed adenocarcinoma, c/w cholangiocarcinoma  - Liver function tests have not improved at all since stent placement.     7/2- Repeat ERCP  - Biliary stent exchange. 15 cm x 10 [place] plastic stent was placed into the dilated segment of the  left intrahepatics. Previously placed biliary stent was removed.   - Liver function tests still have not improved.  Patient is also getting more lethargic and less interactive.  Not eating or drinking much  - Not a good candidate for percutaneous drain placement per IR    PLAN:  - Hospitalist discussing any potential procedure with GI at [facility].  - I called patient's son and discussed that I am worried the patient will most likely never be a good candidate for palliative systemic therapy for his metastatic cholangiocarcinoma.  Patient's son has the same concern and will discuss it further with his father and the rest of the family.    #2 Hypernatremia  - Managemnet per primary team    #3 Hypercalcemia  - Likely due to malignancy  - Reasonable to consider Leonidas Roberto M.D.  [facility]  [phone]    Subjective:    Patient is very lethargic.  Not getting out of bed.  Not eating or drinking much.        Labs:  All labs reviewed    CBC  Recent Labs   Lab 07/07/25  0632 07/06/25  0658 07/05/25  0645   WBC 15.9* 16.0* 17.3*   HGB 9.0* 8.9* 10.3*   * 101* 100   * 158 209       CMP  Recent Labs   Lab 07/07/25  1624 07/07/25  0632 07/07/25  0110 07/06/25  1722 07/06/25  0658 07/05/25  2156 07/05/25  1101 07/05/25  0645 07/05/25  0035 07/04/25  0702   * 156*  --   --  156* 156*   < > 156*  --  153*   POTASSIUM  --  3.7  3.7 3.4 3.2* 3.2*  --    < > 3.4   "3.4   < > 3.4   CHLORIDE  --  126*  --   --  127*  --   --  125*  --  120*   CO2  --  20*  --   --  20*  --   --  21*  --  23   ANIONGAP  --  10  --   --  9  --   --  10  --  10   GLC  --  151*  --   --  158*  --   --  120*  --  119*   BUN  --  41.6*  --   --  47.7*  --   --  47.0*  --  48.9*   CR  --  1.56*  --   --  1.74*  --   --  1.61*  --  1.74*   GFRESTIMATED  --  50*  --   --  44*  --   --  48*  --  44*   SANDRA  --  10.8*  --   --  10.9*  --   --  11.2*  --  11.2*   MAG  --  2.3  --   --  2.4*  --   --  2.5*  --  2.5*   PHOS  --  1.8*  --   --  2.1*  --   --  2.6  --  2.7   PROTTOTAL  --  4.3*  --   --  4.1*  --   --  4.8*  --  4.4*   ALBUMIN  --  2.6*  --   --  2.6*  --   --  2.7*  --  2.6*   BILITOTAL  --  24.6*  --   --  25.0*  --   --  27.8*  --  25.6*   ALKPHOS  --  273*  --   --  278*  --   --  339*  --  351*   AST  --  82*  --   --  83*  --   --  104*  --  113*   ALT  --  141*  --   --  142*  --   --  174*  --  175*    < > = values in this interval not displayed.       INRNo lab results found in last 7 days.    Blood CultureNo results for input(s): \"CULT\" in the last 168 hours.      Paul Roberto MD  [facility]  7/7/2025 6:33 PM        "

## 2025-07-07 NOTE — PLAN OF CARE
"VSS. No reports of pain/SOB. A/Ox2. Regular diet w/ poor appetite. Needs encouragement to eat. SBA w/ gt belt for transferring. Incontinent of urine at times. RT PIV patent. D5 @100ml/hr. Mag/K+/phos replaced. Recheck in the AM. Continued +3 edema.   Goal Outcome Evaluation:      Plan of Care Reviewed With: patient    Overall Patient Progress: no changeOverall Patient Progress: no change    Outcome Evaluation: Monitor for changes in pain.    Problem: Adult Inpatient Plan of Care  Goal: Plan of Care Review  Description: The Plan of Care Review/Shift note should be completed every shift.  The Outcome Evaluation is a brief statement about your assessment that the patient is improving, declining, or no change.  This information will be displayed automatically on your shift  note.  Outcome: Not Progressing  Flowsheets (Taken 7/7/2025 1319)  Outcome Evaluation: Monitor for changes in pain.  Plan of Care Reviewed With: patient  Overall Patient Progress: no change  Goal: Patient-Specific Goal (Individualized)  Description: You can add care plan individualizations to a care plan. Examples of Individualization might be:  \"Parent requests to be called daily at 9am for status\", \"I have a hard time hearing out of my right ear\", or \"Do not touch me to wake me up as it startles  me\".  Outcome: Not Progressing  Goal: Absence of Hospital-Acquired Illness or Injury  Outcome: Not Progressing  Intervention: Identify and Manage Fall Risk  Recent Flowsheet Documentation  Taken 7/7/2025 1312 by Janeen Currie, RN  Safety Promotion/Fall Prevention:   activity supervised   nonskid shoes/slippers when out of bed   safety round/check completed  Intervention: Prevent Skin Injury  Recent Flowsheet Documentation  Taken 7/7/2025 1312 by Janeen Currie, RN  Body Position: position changed independently  Taken 7/7/2025 0829 by Janeen Currie, RN  Body Position: position changed independently  Intervention: Prevent and Manage VTE (Venous " Thromboembolism) Risk  Recent Flowsheet Documentation  Taken 7/7/2025 1312 by Janeen Currie RN  VTE Prevention/Management: (Pt impulsive and at risk for falling while wearing them.)   SCDs off (sequential compression devices)   patient refused intervention  Intervention: Prevent Infection  Recent Flowsheet Documentation  Taken 7/7/2025 1312 by Janeen Currie RN  Infection Prevention:   hand hygiene promoted   rest/sleep promoted  Goal: Optimal Comfort and Wellbeing  Outcome: Not Progressing  Goal: Readiness for Transition of Care  Outcome: Not Progressing     Problem: Fall Injury Risk  Goal: Absence of Fall and Fall-Related Injury  Outcome: Not Progressing  Intervention: Identify and Manage Contributors  Recent Flowsheet Documentation  Taken 7/7/2025 1312 by Janeen Currie RN  Medication Review/Management: medications reviewed  Intervention: Promote Injury-Free Environment  Recent Flowsheet Documentation  Taken 7/7/2025 1312 by Janeen Currie RN  Safety Promotion/Fall Prevention:   activity supervised   nonskid shoes/slippers when out of bed   safety round/check completed     Problem: Acute Kidney Injury/Impairment  Goal: Fluid and Electrolyte Balance  Outcome: Not Progressing  Intervention: Monitor and Manage Fluid and Electrolyte Balance  Recent Flowsheet Documentation  Taken 7/7/2025 1312 by Janeen Currie RN  Fluid/Electrolyte Management: electrolyte supplement initiated  Goal: Improved Oral Intake  Outcome: Not Progressing  Goal: Effective Renal Function  Outcome: Not Progressing  Intervention: Monitor and Support Renal Function  Recent Flowsheet Documentation  Taken 7/7/2025 1312 by Janeen Currie RN  Medication Review/Management: medications reviewed     Problem: Liver Failure  Goal: Optimal Coping with Liver Failure  Outcome: Not Progressing  Goal: Absence of Bleeding  Outcome: Not Progressing  Goal: Fluid and Electrolyte Balance  Outcome: Not Progressing  Intervention: Monitor and Manage  Fluid and Electrolyte Balance  Recent Flowsheet Documentation  Taken 7/7/2025 1312 by Janeen Currie, RN  Fluid/Electrolyte Management: electrolyte supplement initiated  Goal: Optimal Gastrointestinal Function  Outcome: Not Progressing  Intervention: Monitor and Support Gastrointestinal Function  Recent Flowsheet Documentation  Taken 7/7/2025 1312 by Janeen Currie, RN  Fluid/Electrolyte Management: electrolyte supplement initiated  Taken 7/7/2025 0829 by Janeen Currie, RN  Perineal Care:   absorbent brief/pad changed   diaper changed   perineal hygiene encouraged  Goal: Blood Glucose Level Within Target Range  Outcome: Not Progressing  Goal: Hemodynamic Stability  Outcome: Not Progressing  Goal: Absence of Infection Signs and Symptoms  Outcome: Not Progressing  Goal: Optimal Neurologic Function  Outcome: Not Progressing  Goal: Improved Oral Intake  Outcome: Not Progressing  Goal: Optimal Pain Control, Comfort and Function  Outcome: Not Progressing  Goal: Effective Oxygenation and Ventilation  Outcome: Not Progressing  Intervention: Promote Airway Secretion Clearance  Recent Flowsheet Documentation  Taken 7/7/2025 0829 by Janeen Currie, RN  Activity Management: ambulated to bathroom     Problem: Gas Exchange Impaired  Goal: Optimal Gas Exchange  Outcome: Not Progressing

## 2025-07-07 NOTE — PLAN OF CARE
"Pt Aox3-4. Lethargic . VSS. On RA Denies pain. Continues IVF. On K/mg/phos protocols. GI & oncology following.       Goal Outcome Evaluation:      Plan of Care Reviewed With: patient    Overall Patient Progress: no changeOverall Patient Progress: no change      Problem: Adult Inpatient Plan of Care  Goal: Plan of Care Review  Description: The Plan of Care Review/Shift note should be completed every shift.  The Outcome Evaluation is a brief statement about your assessment that the patient is improving, declining, or no change.  This information will be displayed automatically on your shift  note.  Outcome: Progressing  Flowsheets (Taken 7/7/2025 0526)  Plan of Care Reviewed With: patient  Overall Patient Progress: no change  Goal: Patient-Specific Goal (Individualized)  Description: You can add care plan individualizations to a care plan. Examples of Individualization might be:  \"Parent requests to be called daily at 9am for status\", \"I have a hard time hearing out of my right ear\", or \"Do not touch me to wake me up as it startles  me\".  Outcome: Progressing  Goal: Absence of Hospital-Acquired Illness or Injury  Outcome: Progressing  Intervention: Identify and Manage Fall Risk  Recent Flowsheet Documentation  Taken 7/7/2025 0031 by Ruben Lim RN  Safety Promotion/Fall Prevention:   activity supervised   treat underlying cause   treat reversible contributory factors   nonskid shoes/slippers when out of bed  Intervention: Prevent Skin Injury  Recent Flowsheet Documentation  Taken 7/7/2025 0031 by Ruben Lim RN  Body Position: position changed independently  Intervention: Prevent and Manage VTE (Venous Thromboembolism) Risk  Recent Flowsheet Documentation  Taken 7/7/2025 0031 by Ruben Lim RN  VTE Prevention/Management: patient refused intervention  Goal: Optimal Comfort and Wellbeing  Outcome: Progressing  Goal: Readiness for Transition of Care  Outcome: Progressing     Problem: Fall Injury Risk  Goal: Absence " of Fall and Fall-Related Injury  Outcome: Progressing  Intervention: Promote Injury-Free Environment  Recent Flowsheet Documentation  Taken 7/7/2025 0031 by Ruben Lim RN  Safety Promotion/Fall Prevention:   activity supervised   treat underlying cause   treat reversible contributory factors   nonskid shoes/slippers when out of bed     Problem: Acute Kidney Injury/Impairment  Goal: Fluid and Electrolyte Balance  Outcome: Progressing  Goal: Improved Oral Intake  Outcome: Progressing  Goal: Effective Renal Function  Outcome: Progressing     Problem: Liver Failure  Goal: Optimal Coping with Liver Failure  Outcome: Progressing  Goal: Absence of Bleeding  Outcome: Progressing  Goal: Fluid and Electrolyte Balance  Outcome: Progressing  Goal: Optimal Gastrointestinal Function  Outcome: Progressing  Goal: Blood Glucose Level Within Target Range  Outcome: Progressing  Goal: Hemodynamic Stability  Outcome: Progressing  Goal: Absence of Infection Signs and Symptoms  Outcome: Progressing  Goal: Optimal Neurologic Function  Outcome: Progressing  Goal: Improved Oral Intake  Outcome: Progressing  Goal: Optimal Pain Control, Comfort and Function  Outcome: Progressing  Goal: Effective Oxygenation and Ventilation  Outcome: Progressing  Intervention: Promote Airway Secretion Clearance  Recent Flowsheet Documentation  Taken 7/7/2025 0031 by Ruben Lim RN  Cough And Deep Breathing: done independently per patient     Problem: Gas Exchange Impaired  Goal: Optimal Gas Exchange  Outcome: Progressing

## 2025-07-07 NOTE — CONSULTS
Interventional Radiology - Progress Note  Inpatient - [facility]  7/7/2025    IR Brief Note    IR consulted for evaluation for biliary drain placement. Reviewed by Dr Sneed who has spoken w Dr Barnhart. Given the large number of hepatic lesions, there are several areas of ductal dilitation which are unfortunately jailed off. Biliary drain would not decompress a clinically significant portion of the dilated ducts, therefore no procedure recommended.     Thank you kindly for this consultation. IR will not plan to follow. Please contact our service with any questions, concerns, or requests for other intervention.    Hamzah Lei PA-C  Interventional Radiology  [phone] (IR)  *[phone] ([facility] Office)

## 2025-07-07 NOTE — PROGRESS NOTES
"[facility]  [facility]  Gastroenterology Progress Note    Interval History:    Denies abdominal pain, nausea, vomiting.    Physical Exam:    /43 (BP Location: Right arm)   Pulse 78   Temp 98.9  F (37.2  C) (Oral)   Resp 18   Ht 1.727 m (5' 8\")   Wt 55.1 kg (121 lb 7.6 oz)   SpO2 98%   BMI 18.47 kg/m    Temp (24hrs), Av.6  F (37  C), Min:98.4  F (36.9  C), Max:98.9  F (37.2  C)    No data found.    Intake/Output Summary (Last 24 hours) at 2025 1101  Last data filed at 2025 1511  Gross per 24 hour   Intake 765 ml   Output 100 ml   Net 665 ml       Constitutional: No acute distress.  Cardiovascular: RRR.  Respiratory: Nonlabored.  Abdomen: Soft, nondistended, nontender.  Skin: No jaundice.    Additional Comments:  ROS, FH, SH: See initial GI consult for details.    Laboratory Data:  Recent Labs   Lab Test 25  0632 25  0658 25  0645 25  0647 25  0709 25  0711 25  0642   WBC 15.9* 16.0* 17.3*   < > 12.8*   < > 14.5*   HGB 9.0* 8.9* 10.3*   < > 10.2*   < > 11.3*   * 101* 100   < > 91   < > 91   * 158 209   < > 284   < > 277   INR  --   --   --   --  1.14  --  1.09    < > = values in this interval not displayed.     Recent Labs   Lab Test 25  0632 25  0110 25  1722 25  0658 25  2156 25  1101 25  0645   *  --   --  156* 156*   < > 156*   POTASSIUM 3.7  3.7 3.4 3.2* 3.2*  --    < > 3.4  3.4   CHLORIDE 126*  --   --  127*  --   --  125*   CO2 20*  --   --  20*  --   --  21*   BUN 41.6*  --   --  47.7*  --   --  47.0*   CR 1.56*  --   --  1.74*  --   --  1.61*   ANIONGAP 10  --   --  9  --   --  10   SANDRA 10.8*  --   --  10.9*  --   --  11.2*    < > = values in this interval not displayed.     Recent Labs   Lab Test 25  0632 25  0658 25  0645 25  0702 25  0706 25  0711 25  0833 25  0642   ALBUMIN 2.6* 2.6* 2.7* 2.6* 2.9*   " < >  --  3.0*   BILITOTAL 24.6* 25.0* 27.8* 25.6* 28.8*   < >  --  33.4*   DBIL  --  18.25*  --  19.38* 21.26*   < >  --   --    * 142* 174* 175* 188*   < >  --  140*   AST 82* 83* 104* 113* 138*   < >  --  135*   ALKPHOS 273* 278* 339* 351* 360*   < >  --  489*   PROTEIN  --   --   --   --   --   --  10*  --    LIPASE  --   --   --   --   --   --   --  31    < > = values in this interval not displayed.       Imaging / Endoscopy:    CT Chest Abdomen Pelvis 6/24/25  1. Findings worrisome for malignancy within the liver. The liver appears heterogeneous with suggestion of underlying hepatic masses primarily at the right liver. There is severe left worse than right intrahepatic biliary ductal dilatation with cutoff of the biliary tree at the central liver. Recommend correlation with hepatic MRI and MRCP.  2.  Indeterminate focal region of wall thickening and decompression at the mid ascending colon. This should be considered a colonic mass until proven otherwise. Recommend correlation with colonoscopy.  3.  Several indeterminate pulmonary nodules bilaterally. Metastatic disease is possible. This could also relate to an atypical infectious or inflammatory etiology. Recommend follow-up short interval CT chest in 3 months.  4.  Small ascites. A few borderline prominent upper abdominal lymph nodes are suggested but these are limited in view.  5.  Technically indeterminate small sclerosis at the left ischial tuberosity.  6.  Mild anasarca.    RUQ US 6/24/25  1.  Moderate to severe intrahepatic biliary ductal dilatation in the left hepatic lobe. The common duct is only visualized at the hepatic hilum where it is dilated to 12 mm. Consider MRCP for further evaluation.  2.  Heterogeneous liver with multiple indeterminate hypoechoic lesions in the liver. The lesions are better seen on the noncontrast CT performed today. These are indeterminate but suspicious for malignancy. These can also be evaluated on the MRCP.  3.   Small ascites.    MRCP 6/25/25  1.  Massive intrahepatic ductal dilatation. Mildly T2 hyperintense somewhat ill-defined mass at the biliary confluence extending somewhat into the right hepatic lobe. There are bilobar hepatic metastases present. Additionally, there is a ill-defined mildly T2 hyperintense mass at the pancreatic tail abutting and possibly involving the spleen and anterior aspect of the left kidney. Findings may represent intrahepatic obstructing cholangiocarcinoma with hepatic and pancreatic metastases, versus primary pancreatic neoplasm with diffuse hepatic metastases resulting in biliary obstruction.  2.  Heterogeneous appearance of the spleen, which may represent metastatic involvement and/or infarcts related to involvement by adjacent pancreatic mass.  3.  Previously noted possible ascending colon mass is not well visualized by MRI. This may have been artifactual.  4.  Interval worsening of diffuse ascites and mesenteric edema. Malignant ascites is possible.  5.  Small right and trace left pleural effusions. Pulmonary nodules better evaluated on prior CT, and will warrant attention on follow-up.    EUS 6/26/25 ([facility])   - Innumerable hepatic masses with obstruction of the bile duct at the hilum. Fine-needle aspiration as above.     ERCP 6/26/25 ([facility])   - Biliary obstruction at the hilum secondary to masses seen on EUS. 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 Sphincterotomy, stenting as above.     Colonoscopy 6/26/25 ([facility])   - The entire examined colon is normal on direct and retroflexion views.     CT Chest Abdomen Pelvis 6/30/25  1.  Moderate volume bilateral pleural effusions and adjacent atelectasis.  2.  Interlobular septal thickening and groundglass attenuation in the lower lobes, right greater than left, suggestive of edema, atelectasis, and/or pneumonitis.  3.  At least moderate volume ascites.  4.   Heterogenous appearance of the liver with several vague low attenuating hepatic lesions, better characterized on recent abdominal MRI.  5.  Intrahepatic bile duct dilation, improved to comparison. Bile duct stent in expected position.  6.  Pancreatic duct stent is located in the left hemicolon near the splenic flexure.  7.  Additional nonacute findings as above.    ERCP 7/2/25 ([facility])   - Biliary stent exchange. 15 cm x 10 [place] plastic stent was placed into the dilated segment of the left intrahepatics. Previously placed biliary stent was removed.     Assessment & Plan:  Zoltan Dalton is a 60-year-old male with PMH including tobacco use disorder (but had not been to a doctor) who was admitted 6/24/25 with abdominal pain and obstructive jaundice. Found to have elevated LFTs with intra/extra-hepatic ductal dilation and hepatic masses on imaging.    1) Obstructive jaundice: ERCP performed 6/26 with sphincterotomy and stent, and repeated 7/2 with stent exchange. Despite this, LFTs remain elevated with minimal improvement. IR to consider percutaneous drainage, however it appears this would not be simple due to multiple areas that are obstructed and it is unclear if this would alleviate symptoms.    2) Metastatic cholangiocarcinoma: FNA confirmed adenocarcinoma. Oncology and palliative care are following.    PLAN:   - Monitor LFTs.   - Diet as tolerated.   - No further role for ERCP.   - Further stenting (if indicated) by IR - would likely need to be done at [facility].    Discussed with Dr. Saba. GI will sign off at this time. Please call if further assistance is needed.     Total time: 18 minutes of total time was spent providing patient care, including patient evaluation, reviewing documentation/test results, and .    Carlita Tavarez PA-C  [facility] ([facility])

## 2025-07-07 NOTE — PROGRESS NOTES
[facility]    Medicine Progress Note - Hospitalist Service    Date of Admission:  6/24/2025    Assessment & Plan   Summary of Stay:   Zoltan Dalton is a 60 year old male with PMH of tobacco use who presented with abdominal pain and was admitted on 6/24/2025 admitted for obstructive jaundice with metastatic malignancy hepatobiliary versus pancreatic source.      He does not interact with the healthcare system and does not have a primary care doctor or health insurance.  Here upon arrival he was found to have bilirubin of 33.4 with alkaline phosphatase of 489, AST of 135 and ALT of 140.  Creatinine was 2.24 with white blood count elevated at 14.5.  Abdominal ultrasound showed moderate to severe intrahepatic biliary ductal dilatation as well as common duct dilated to 12 mm and heterogenous liver.  CT showed concern for hepatic masses and pulmonary nodules as well as small ascites.     Due to relative paucity of ascites ultrasound guided paracentesis could not be completed.  CEA was elevated at 43.1, CA 19-9 was 31 and AFP was less than 1.8.  Hepatitis panel was negative.  MRCP showed massive intrahepatic ductal dilatation with concern for intrahepatic obstructing cholangiocarcinoma versus pancreatic neoplasm with mets.     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  ERCP repeated on 7/2/25 with stent exchange     Metastatic cholangiocarcinoma  Multiple liver lesions and intrahepatic biliary ductal dilatation s/p stenting 6/26 and repositioning of stent on 7/ 2  Elevated liver transaminase levels, obstructive jaundice    -- Status post MRCP, then on 6/26 ERCP and EUS as above.  -- ERCP repeated 7/2 with stent exchange        Path returned as adenocarcinoma    -- Oncology consulted and following       7/7 - discussed with Dr. Roberto from oncology today.   Recommending discussing the possibility of biliary drain with IR today    -- Palliative care consulted.  Tumor is treatable but not curable.  Patient wants to continue restorative cares for now.    7/7 - per oncology, he may not be a great candidate currently for chemotherapy treatment.  Dr. Roberto will take further will son later today    Continuing to follow LFT's    Hypernatremia (poor oral intake in setting of liver failure and third spacing)  PIYUSH with worsening renal functions  Cannot exclude hepatorenal    Sodium remains elevated despite IVF being started 7/6/25  Sodium unchanged in the last 24 hours despite IVF being changed to d5W    Continue with IVF at current rate  Repeat sodium later this afternoon  Monitor I/O's closely    If persistently elevated later today will need to consider Nephrology consultation for further med management    Creat improving with IVF 1.56 (1.74)    Hypercalcemia    Being monitored closely by oncology  Improved from admission; unchanged from 7/6/25  Continue to monitor daily    Failure to thrive  Severe malnutrition in context of acute illness     Continues to have a very poor appetite, malnourished.     Started on Remeron.   Dietician consult requested     Leukocytosis : Slowly improving  --CT CAP done and showed moderate bilateral pleural effusions and atelectasis, interlobular septal thickening and groundglass attenuation in lower lobes, suggestive of edema, atelectasis, and/or pneumonitis.   --Suspect leukocytosis is related to malignancy and not acute infection.  - Trend WBC, low threshold to culture or start antibiotics if febrile.     Pleural effusions  Possible pulmonary edema  Noted on CT 6/30. No respiratory symptoms, no hypoxia.  Continue to monitor respiratory status closely with IVF given     Colonic mass, ruled out  Previous colonoscopy '21 unremarkable, though had 4mm tubular adenoma in transverse colon on colonoscopy '15.   Colonoscopy on 6/26 showed a normal  "colon.      Bilateral pulmonary nodules  Possible metastatic disease vs infectious or inflammatory.   - Follow up CT chest in 3 months pending above workup     Tobacco use disorder - daily smoker, encourage cessation          PPE Used:  Mask, gloves          Diet: Snacks/Supplements Adult: Ensure Plus High Protein; With Meals  Regular Diet Adult    DVT Prophylaxis: Heparin SQ  Maki Catheter: Not present  Lines: None     Cardiac Monitoring: None  Code Status: Full Code      Clinically Significant Risk Factors        # Hypokalemia: Lowest K = 3.2 mmol/L in last 2 days, will replace as needed  # Hypernatremia: Highest Na = 157 mmol/L in last 2 days, will monitor as appropriate  # Hyperchloremia: Highest Cl = 127 mmol/L in last 2 days, will monitor as appropriate       # Hypercalcemia: Highest Ca = 10.9 mg/dL in last 2 days, will monitor as appropriate    # Hypoalbuminemia: Lowest albumin = 2.2 g/dL at 6/30/2025  7:12 AM, will monitor as appropriate                 # Severe Malnutrition: based on nutrition assessment and treatment provided per dietitian's recommendations.           Disposition Plan     Medically Ready for Discharge: Anticipated in 2-4 Days             Suze Barnhart DO  Hospitalist Service  [employer]  Securely message with [employer] (more info)  Text page via [facility] Paging/Directory   ______________________________________________________________________    Interval History     Seen alone in room this am.  D/W oncology on the phone earlier today.      Poor po intake.  Unable to tell me why he is no eating or drinking after asking ? Multiple times.  States that \"I am tired\".      Lives with his son, Zoltan.      Denies current CP, SOB, HA, N/V, F/C.  Denies abd pain.  + distension.  Has not yet been up out of bed this am    Physical Exam   Vital Signs: Temp: 98.5  F (36.9  C) Temp src: Oral BP: 122/57 Pulse: 79   Resp: 16 SpO2: 98 % O2 Device: None (Room air)    Weight: " 121 lbs 7.58 oz      GEN:  Alert, awake, min conversant  HEENT:  Normocephalic/atraumatic, no scleral icterus, no nasal discharge  CV:  Regular rate and rhythm, no loud murmur or JVD.  S1 + S2 noted  LUNGS:  Clear to auscultation ant/lat bilaterally without rales/rhonchi/wheezing/retractions.  Symmetric chest rise on inhalation noted.  ABD:  + bowel sounds, soft, non-tender to light palpation throughout; mod distended.  No clear rebound/guarding/rigidity.  EXT:  1+ pitting pretibial edema bilaterally.  No cyanosis.    SKIN:  Dry to touch, no exanthems noted in the visualized areas.    Medical Decision Making       54 MINUTES SPENT BY ME on the date of service doing chart review, history, exam, documentation & further activities per the note.      This total time includes time spent on the phone discussing case and plan of care with oncology and IR this am  Data   Medications   Current Facility-Administered Medications   Medication Dose Route Frequency Provider Last Rate Last Admin    dextrose 5% infusion   Intravenous Continuous Akash Vazquez  mL/hr at 07/07/25 0439 100 mL/hr at 07/07/25 0439     Current Facility-Administered Medications   Medication Dose Route Frequency Provider Last Rate Last Admin    mirtazapine (REMERON) tablet 15 mg  15 mg Oral At Bedtime Jane Rhodes DO   15 mg at 07/06/25 2207    senna-docusate (SENOKOT-S/PERICOLACE) 8.6-50 MG per tablet 1 tablet  1 tablet Oral BID Ham Nuñez APRN CNP   1 tablet at 07/02/25 2035    Or    senna-docusate (SENOKOT-S/PERICOLACE) 8.6-50 MG per tablet 2 tablet  2 tablet Oral BID Ham Nuñez APRN CNP   2 tablet at 07/04/25 2020    sodium chloride (PF) 0.9% PF flush 3 mL  3 mL Intracatheter Q8H [facility] Ham Nuñez APRN CNP   3 mL at 07/03/25 2114     Labs and Imaging results below reviewed today.  Recent Labs   Lab 07/07/25  0632 07/06/25  0658 07/05/25  0645   WBC 15.9* 16.0* 17.3*   HGB 9.0* 8.9* 10.3*   HCT 25.4*  "25.1* 29.3*   * 101* 100   * 158 209     Recent Labs   Lab 07/07/25  0632 07/07/25  0110 07/06/25  1722 07/06/25 0658 07/05/25 2156 07/05/25 1101 07/05/25  0645   *  --   --  156* 156*   < > 156*   POTASSIUM 3.7  3.7 3.4 3.2* 3.2*  --    < > 3.4  3.4   CHLORIDE 126*  --   --  127*  --   --  125*   CO2 20*  --   --  20*  --   --  21*   ANIONGAP 10  --   --  9  --   --  10   *  --   --  158*  --   --  120*   BUN 41.6*  --   --  47.7*  --   --  47.0*   CR 1.56*  --   --  1.74*  --   --  1.61*   GFRESTIMATED 50*  --   --  44*  --   --  48*   SANDRA 10.8*  --   --  10.9*  --   --  11.2*    < > = values in this interval not displayed.     Recent Labs   Lab 07/07/25  0632 07/07/25  0110 07/06/25 1722 07/06/25 0658 07/05/25 2156 07/05/25 1101 07/05/25  0645   *  --   --  156* 156*   < > 156*   POTASSIUM 3.7  3.7 3.4 3.2* 3.2*  --    < > 3.4  3.4   CHLORIDE 126*  --   --  127*  --   --  125*   CO2 20*  --   --  20*  --   --  21*   ANIONGAP 10  --   --  9  --   --  10   *  --   --  158*  --   --  120*   BUN 41.6*  --   --  47.7*  --   --  47.0*   CR 1.56*  --   --  1.74*  --   --  1.61*   GFRESTIMATED 50*  --   --  44*  --   --  48*   SANDRA 10.8*  --   --  10.9*  --   --  11.2*   MAG 2.3  --   --  2.4*  --   --  2.5*   PHOS 1.8*  --   --  2.1*  --   --  2.6   PROTTOTAL 4.3*  --   --  4.1*  --   --  4.8*   ALBUMIN 2.6*  --   --  2.6*  --   --  2.7*   BILITOTAL 24.6*  --   --  25.0*  --   --  27.8*   ALKPHOS 273*  --   --  278*  --   --  339*   AST 82*  --   --  83*  --   --  104*   *  --   --  142*  --   --  174*    < > = values in this interval not displayed.     No results for input(s): \"INR\" in the last 168 hours.    No results found for this or any previous visit (from the past 24 hours).    "

## 2025-07-07 NOTE — PLAN OF CARE
"Goal Outcome Evaluation:      Plan of Care Reviewed With: patient    Overall Patient Progress: no changeOverall Patient Progress: no change    Outcome Evaluation: denies pain/N/V/SOB.  A & O x 4, VSS, LS diminished, O2 95% RA, Regular diet, poor appetite, IVF infusing, Mg+ & phos protocol, recheck am. K+ replacing, up A x 1.gb/w, GI/PT following.  Plan TBD:  Will continue POC and monitoring.   Problem: Adult Inpatient Plan of Care  Goal: Plan of Care Review  Description: The Plan of Care Review/Shift note should be completed every shift.  The Outcome Evaluation is a brief statement about your assessment that the patient is improving, declining, or no change.  This information will be displayed automatically on your shift  note.  Outcome: Progressing  Flowsheets (Taken 7/6/2025 2241)  Outcome Evaluation: denies pain/N/V/SOB.  Plan of Care Reviewed With: patient  Overall Patient Progress: no change  Goal: Patient-Specific Goal (Individualized)  Description: You can add care plan individualizations to a care plan. Examples of Individualization might be:  \"Parent requests to be called daily at 9am for status\", \"I have a hard time hearing out of my right ear\", or \"Do not touch me to wake me up as it startles  me\".  Outcome: Progressing  Goal: Absence of Hospital-Acquired Illness or Injury  Outcome: Progressing  Intervention: Identify and Manage Fall Risk  Recent Flowsheet Documentation  Taken 7/6/2025 1545 by Leilani Cowan RN  Safety Promotion/Fall Prevention:   activity supervised   treat underlying cause   treat reversible contributory factors   nonskid shoes/slippers when out of bed  Intervention: Prevent Skin Injury  Recent Flowsheet Documentation  Taken 7/6/2025 1545 by Leilani Cowan RN  Body Position: position changed independently  Intervention: Prevent and Manage VTE (Venous Thromboembolism) Risk  Recent Flowsheet Documentation  Taken 7/6/2025 1545 by Leilani Cowan RN  VTE Prevention/Management: SCDs " on (sequential compression devices)  Intervention: Prevent Infection  Recent Flowsheet Documentation  Taken 7/6/2025 1545 by Leilani Cowan RN  Infection Prevention: rest/sleep promoted  Goal: Optimal Comfort and Wellbeing  Outcome: Progressing  Goal: Readiness for Transition of Care  Outcome: Progressing     Problem: Fall Injury Risk  Goal: Absence of Fall and Fall-Related Injury  Outcome: Progressing  Intervention: Identify and Manage Contributors  Recent Flowsheet Documentation  Taken 7/6/2025 1545 by Leilani Cowan RN  Medication Review/Management: medications reviewed  Intervention: Promote Injury-Free Environment  Recent Flowsheet Documentation  Taken 7/6/2025 1545 by Leilani Cowan RN  Safety Promotion/Fall Prevention:   activity supervised   treat underlying cause   treat reversible contributory factors   nonskid shoes/slippers when out of bed     Problem: Acute Kidney Injury/Impairment  Goal: Fluid and Electrolyte Balance  Outcome: Progressing  Intervention: Monitor and Manage Fluid and Electrolyte Balance  Recent Flowsheet Documentation  Taken 7/6/2025 1545 by Leilani Cowan RN  Fluid/Electrolyte Management:   fluids provided   electrolyte supplement adjusted  Goal: Improved Oral Intake  Outcome: Progressing  Goal: Effective Renal Function  Outcome: Progressing  Intervention: Monitor and Support Renal Function  Recent Flowsheet Documentation  Taken 7/6/2025 1545 by Leilani Cowan RN  Medication Review/Management: medications reviewed     Problem: Liver Failure  Goal: Optimal Coping with Liver Failure  Outcome: Progressing  Goal: Absence of Bleeding  Outcome: Progressing  Intervention: Monitor and Manage Coagulation  Recent Flowsheet Documentation  Taken 7/6/2025 1545 by Leilani Cowan RN  Bleeding Precautions: blood pressure closely monitored  Goal: Fluid and Electrolyte Balance  Outcome: Progressing  Intervention: Monitor and Manage Fluid and Electrolyte Balance  Recent Flowsheet  Documentation  Taken 7/6/2025 1545 by Leilani Cowan RN  Fluid/Electrolyte Management:   fluids provided   electrolyte supplement adjusted  Goal: Optimal Gastrointestinal Function  Outcome: Progressing  Intervention: Monitor and Support Gastrointestinal Function  Recent Flowsheet Documentation  Taken 7/6/2025 1545 by Leilani Cowan RN  Fluid/Electrolyte Management:   fluids provided   electrolyte supplement adjusted  Goal: Blood Glucose Level Within Target Range  Outcome: Progressing  Goal: Hemodynamic Stability  Outcome: Progressing  Goal: Absence of Infection Signs and Symptoms  Outcome: Progressing  Intervention: Prevent or Manage Infection  Recent Flowsheet Documentation  Taken 7/6/2025 1545 by Leilani Cowan RN  Infection Management: aseptic technique maintained  Goal: Optimal Neurologic Function  Outcome: Progressing  Intervention: Monitor and Optimize Neurologic Status  Recent Flowsheet Documentation  Taken 7/6/2025 1545 by Leilani Cowan RN  Hepatic Encephalopathy Management: airway protection maintained  Head of Bed (HOB) Positioning: HOB at 20-30 degrees  Goal: Improved Oral Intake  Outcome: Progressing  Goal: Optimal Pain Control, Comfort and Function  Outcome: Progressing  Intervention: Prevent or Manage Pain  Recent Flowsheet Documentation  Taken 7/6/2025 1545 by Leilani Cowan RN  Sleep/Rest Enhancement: awakenings minimized  Goal: Effective Oxygenation and Ventilation  Outcome: Progressing  Intervention: Promote Airway Secretion Clearance  Recent Flowsheet Documentation  Taken 7/6/2025 1545 by Leilani Cowan RN  Cough And Deep Breathing: done independently per patient  Activity Management: activity adjusted per tolerance  Intervention: Optimize Oxygenation and Ventilation  Recent Flowsheet Documentation  Taken 7/6/2025 1545 by Leilani Cowan RN  Head of Bed (HOB) Positioning: HOB at 20-30 degrees     Problem: Gas Exchange Impaired  Goal: Optimal Gas Exchange  Outcome:  Progressing  Intervention: Optimize Oxygenation and Ventilation  Recent Flowsheet Documentation  Taken 7/6/2025 9275 by Leilani Cowan, RN  Head of Bed (HOB) Positioning: HOB at 20-30 degrees

## 2025-07-08 LAB
ALBUMIN SERPL BCG-MCNC: 2.4 G/DL (ref 3.5–5.2)
ALP SERPL-CCNC: 286 U/L (ref 40–150)
ALT SERPL W P-5'-P-CCNC: 152 U/L (ref 0–70)
ANION GAP SERPL CALCULATED.3IONS-SCNC: 11 MMOL/L (ref 7–15)
AST SERPL W P-5'-P-CCNC: 88 U/L (ref 0–45)
BILIRUB DIRECT SERPL-MCNC: 18.31 MG/DL (ref 0–0.3)
BILIRUB SERPL-MCNC: 23.1 MG/DL
BUN SERPL-MCNC: 41.9 MG/DL (ref 8–23)
CALCIUM SERPL-MCNC: 10.3 MG/DL (ref 8.8–10.4)
CHLORIDE SERPL-SCNC: 122 MMOL/L (ref 98–107)
CREAT SERPL-MCNC: 1.61 MG/DL (ref 0.67–1.17)
EGFRCR SERPLBLD CKD-EPI 2021: 48 ML/MIN/1.73M2
ERYTHROCYTE [DISTWIDTH] IN BLOOD BY AUTOMATED COUNT: 26.7 % (ref 10–15)
GLUCOSE SERPL-MCNC: 153 MG/DL (ref 70–99)
HCO3 SERPL-SCNC: 19 MMOL/L (ref 22–29)
HCT VFR BLD AUTO: 26.3 % (ref 40–53)
HGB BLD-MCNC: 9.2 G/DL (ref 13.3–17.7)
MAGNESIUM SERPL-MCNC: 2.1 MG/DL (ref 1.7–2.3)
MCH RBC QN AUTO: 35.5 PG (ref 26.5–33)
MCHC RBC AUTO-ENTMCNC: 35 G/DL (ref 31.5–36.5)
MCV RBC AUTO: 102 FL (ref 78–100)
PHOSPHATE SERPL-MCNC: 2.5 MG/DL (ref 2.5–4.5)
PLATELET # BLD AUTO: 122 10E3/UL (ref 150–450)
POTASSIUM SERPL-SCNC: 3.1 MMOL/L (ref 3.4–5.3)
POTASSIUM SERPL-SCNC: 3.3 MMOL/L (ref 3.4–5.3)
POTASSIUM SERPL-SCNC: 3.8 MMOL/L (ref 3.4–5.3)
PROT SERPL-MCNC: 4.2 G/DL (ref 6.4–8.3)
RBC # BLD AUTO: 2.59 10E6/UL (ref 4.4–5.9)
SODIUM SERPL-SCNC: 148 MMOL/L (ref 135–145)
SODIUM SERPL-SCNC: 149 MMOL/L (ref 135–145)
SODIUM SERPL-SCNC: 152 MMOL/L (ref 135–145)
WBC # BLD AUTO: 16.4 10E3/UL (ref 4–11)

## 2025-07-08 PROCEDURE — 258N000003 HC RX IP 258 OP 636: Performed by: INTERNAL MEDICINE

## 2025-07-08 PROCEDURE — 99233 SBSQ HOSP IP/OBS HIGH 50: CPT | Performed by: CLINICAL NURSE SPECIALIST

## 2025-07-08 PROCEDURE — 36415 COLL VENOUS BLD VENIPUNCTURE: CPT | Performed by: INTERNAL MEDICINE

## 2025-07-08 PROCEDURE — 250N000013 HC RX MED GY IP 250 OP 250 PS 637: Performed by: STUDENT IN AN ORGANIZED HEALTH CARE EDUCATION/TRAINING PROGRAM

## 2025-07-08 PROCEDURE — 82435 ASSAY OF BLOOD CHLORIDE: CPT | Performed by: INTERNAL MEDICINE

## 2025-07-08 PROCEDURE — 99233 SBSQ HOSP IP/OBS HIGH 50: CPT | Performed by: INTERNAL MEDICINE

## 2025-07-08 PROCEDURE — 84132 ASSAY OF SERUM POTASSIUM: CPT | Performed by: INTERNAL MEDICINE

## 2025-07-08 PROCEDURE — 99254 IP/OBS CNSLTJ NEW/EST MOD 60: CPT | Performed by: INTERNAL MEDICINE

## 2025-07-08 PROCEDURE — 85014 HEMATOCRIT: CPT | Performed by: INTERNAL MEDICINE

## 2025-07-08 PROCEDURE — 99418 PROLNG IP/OBS E/M EA 15 MIN: CPT | Performed by: CLINICAL NURSE SPECIALIST

## 2025-07-08 PROCEDURE — 84295 ASSAY OF SERUM SODIUM: CPT | Performed by: INTERNAL MEDICINE

## 2025-07-08 PROCEDURE — 84155 ASSAY OF PROTEIN SERUM: CPT | Performed by: INTERNAL MEDICINE

## 2025-07-08 PROCEDURE — 120N000001 HC R&B MED SURG/OB

## 2025-07-08 PROCEDURE — 250N000013 HC RX MED GY IP 250 OP 250 PS 637: Performed by: NURSE PRACTITIONER

## 2025-07-08 PROCEDURE — 250N000013 HC RX MED GY IP 250 OP 250 PS 637: Performed by: INTERNAL MEDICINE

## 2025-07-08 PROCEDURE — 83735 ASSAY OF MAGNESIUM: CPT | Performed by: INTERNAL MEDICINE

## 2025-07-08 PROCEDURE — 250N000011 HC RX IP 250 OP 636: Performed by: INTERNAL MEDICINE

## 2025-07-08 PROCEDURE — 84100 ASSAY OF PHOSPHORUS: CPT | Performed by: INTERNAL MEDICINE

## 2025-07-08 RX ORDER — POTASSIUM CHLORIDE 1500 MG/1
40 TABLET, EXTENDED RELEASE ORAL ONCE
Status: COMPLETED | OUTPATIENT
Start: 2025-07-08 | End: 2025-07-08

## 2025-07-08 RX ADMIN — POTASSIUM CHLORIDE 40 MEQ: 1500 TABLET, EXTENDED RELEASE ORAL at 10:39

## 2025-07-08 RX ADMIN — POTASSIUM CHLORIDE 20 MEQ: 1500 TABLET, EXTENDED RELEASE ORAL at 16:28

## 2025-07-08 RX ADMIN — HEPARIN SODIUM 5000 UNITS: 5000 INJECTION, SOLUTION INTRAVENOUS; SUBCUTANEOUS at 05:15

## 2025-07-08 RX ADMIN — DEXTROSE MONOHYDRATE: 50 INJECTION, SOLUTION INTRAVENOUS at 20:04

## 2025-07-08 RX ADMIN — DEXTROSE MONOHYDRATE: 50 INJECTION, SOLUTION INTRAVENOUS at 11:56

## 2025-07-08 RX ADMIN — MIRTAZAPINE 15 MG: 15 TABLET, FILM COATED ORAL at 21:40

## 2025-07-08 RX ADMIN — SENNOSIDES AND DOCUSATE SODIUM 1 TABLET: 50; 8.6 TABLET ORAL at 21:40

## 2025-07-08 RX ADMIN — HEPARIN SODIUM 5000 UNITS: 5000 INJECTION, SOLUTION INTRAVENOUS; SUBCUTANEOUS at 16:28

## 2025-07-08 RX ADMIN — DEXTROSE MONOHYDRATE: 50 INJECTION, SOLUTION INTRAVENOUS at 01:29

## 2025-07-08 RX ADMIN — HYDROMORPHONE HYDROCHLORIDE 1 MG: 2 TABLET ORAL at 17:36

## 2025-07-08 ASSESSMENT — ACTIVITIES OF DAILY LIVING (ADL)
ADLS_ACUITY_SCORE: 54
ADLS_ACUITY_SCORE: 54
ADLS_ACUITY_SCORE: 47
ADLS_ACUITY_SCORE: 54
ADLS_ACUITY_SCORE: 47
ADLS_ACUITY_SCORE: 54
ADLS_ACUITY_SCORE: 54
ADLS_ACUITY_SCORE: 50
ADLS_ACUITY_SCORE: 47
ADLS_ACUITY_SCORE: 54
ADLS_ACUITY_SCORE: 47
ADLS_ACUITY_SCORE: 54
ADLS_ACUITY_SCORE: 47
ADLS_ACUITY_SCORE: 54
ADLS_ACUITY_SCORE: 47

## 2025-07-08 NOTE — PROVIDER NOTIFICATION
"8:35  MD updated, \"Lab called critical result: Bilirubin 26.7 \"  "
Dr. Ayala messaged via [employer]: pt diet currently clears. should we be advancing? he has only had about 240 to drink today. sleeping all day in between bathroom breaks. refused getting oob.. MD responded to advance diet to hepatic diet   
Wilder messaged Dr. Vazquez that RN (writer) has tried to encourage pt all day to try and eat and/or drink but pt has refused. Only has had sips of water with medications this morning and one sip of orange juice when nephrology MD was in the room. RN told pt that he needs to eat and/or drink something to help him feel better but pt still refused. Pt refusing pain medications when offered. Pt refused PT today. RN asked what the plan is for the pt because he is refusing.     Dr. Vazquez acknowledged pt refusals and said that pt son and wife will be coming into hospital tomorrow.        
n/a

## 2025-07-08 NOTE — PROGRESS NOTES
[facility]    Medicine Progress Note - Hospitalist Service    Date of Admission:  6/24/2025    Assessment & Plan   Summary of Stay:   Zoltan Dalton is a 60 year old male with PMH of tobacco use who presented with abdominal pain and was admitted on 6/24/2025 admitted for obstructive jaundice with metastatic malignancy hepatobiliary versus pancreatic source.      He does not interact with the healthcare system and does not have a primary care doctor or health insurance.  Here upon arrival he was found to have bilirubin of 33.4 with alkaline phosphatase of 489, AST of 135 and ALT of 140.  Creatinine was 2.24 with white blood count elevated at 14.5.  Abdominal ultrasound showed moderate to severe intrahepatic biliary ductal dilatation as well as common duct dilated to 12 mm and heterogenous liver.  CT showed concern for hepatic masses and pulmonary nodules as well as small ascites.     Due to relative paucity of ascites ultrasound guided paracentesis could not be completed.  CEA was elevated at 43.1, CA 19-9 was 31 and AFP was less than 1.8.  Hepatitis panel was negative.  MRCP showed massive intrahepatic ductal dilatation with concern for intrahepatic obstructing cholangiocarcinoma versus pancreatic neoplasm with mets.     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  ERCP repeated on 7/2/25 with stent exchange    At this point prognosis is guarded.   my colleague discussed biliary drain with IR and it does not seem that this is likely to be effective given multiple collections.  Oncology indicating that he may not ever reach a functional status where he can tolerate palliative chemotherapy.  Consulted palliative care and I have talked about hospice with him.  Oral intake remains marginal.     Metastatic cholangiocarcinoma  Multiple liver lesions and  intrahepatic biliary ductal dilatation s/p stenting 6/26 and repositioning of stent on 7/ 2  Elevated liver transaminase levels, obstructive jaundice    -- Status post MRCP, then on 6/26 ERCP and EUS as above.  -- ERCP repeated 7/2 with stent exchange.    Path returned as adenocarcinoma  -- Oncology consulted and following.    -- Palliative care consulted.  Tumor is treatable but not curable.  Patient wants to continue restorative cares for now.    --Unclear if the patient will ever be strong enough to tolerate Targeted treatment.  7/7 - per oncology, he may not be a great candidate currently for chemotherapy treatment.  Dr. Roberto will talk further will son   --Continuing to follow LFT's    Hypernatremia (poor oral intake in setting of liver failure and third spacing)  PIYUSH with worsening renal functions  --Sodium trending down with hypotonic fluids.  -- Recheck BMP in the morning.  -- Avoid nephrotoxins.    Hypercalcemia  Being monitored closely by oncology  Continue to monitor daily    Failure to thrive  Severe malnutrition in context of acute illness   Continues to have a very poor appetite, malnourished.     Started on Remeron.   Dietician consulted    Leukocytosis : Slowly improving  --CT CAP done and showed moderate bilateral pleural effusions and atelectasis, interlobular septal thickening and groundglass attenuation in lower lobes, suggestive of edema, atelectasis, and/or pneumonitis.   --Suspect leukocytosis is related to malignancy and not acute infection.  - Trend WBC, low threshold to culture or start antibiotics if febrile.     Pleural effusions  Possible pulmonary edema  Noted on CT 6/30. No respiratory symptoms, no hypoxia.  Continue to monitor respiratory status closely with IVF given     Colonic mass, ruled out  Previous colonoscopy '21 unremarkable, though had 4mm tubular adenoma in transverse colon on colonoscopy '15.   Colonoscopy on 6/26 showed a normal colon.      Bilateral pulmonary  nodules  Possible metastatic disease vs infectious or inflammatory.   - Follow up CT chest in 3 months pending above workup     Tobacco use disorder - daily smoker, encourage cessation             Diet: Snacks/Supplements Adult: Ensure Plus High Protein; With Meals  Regular Diet Adult  Snacks/Supplements Adult: Ensure Clear; Between Meals    DVT Prophylaxis: Heparin SQ  Maki Catheter: Not present  Lines: None     Cardiac Monitoring: None  Code Status: Full Code      Clinically Significant Risk Factors        # Hypokalemia: Lowest K = 3.1 mmol/L in last 2 days, will replace as needed  # Hypernatremia: Highest Na = 156 mmol/L in last 2 days, will monitor as appropriate  # Hyperchloremia: Highest Cl = 126 mmol/L in last 2 days, will monitor as appropriate       # Hypercalcemia: Highest Ca = 10.8 mg/dL in last 2 days, will monitor as appropriate    # Hypoalbuminemia: Lowest albumin = 2.2 g/dL at 6/30/2025  7:12 AM, will monitor as appropriate   # Thrombocytopenia: Lowest platelets = 122 in last 2 days, will monitor for bleeding               # Severe Malnutrition: based on nutrition assessment and treatment provided per dietitian's recommendations.           Disposition Plan     Medically Ready for Discharge: Anticipated in 2-4 Days             Akash Vazquez MD  Hospitalist Service  [facility]  Securely message with [employer] (more info)  Text page via [employer] Paging/Directory   ______________________________________________________________________    Interval History     Discussed goals of care.  I did share my concerns regarding potential that he will be strong enough to tolerate directed treatment.    Notes reviewed yesterday, my colleague discussed with IR the possibility of the area drained but that does not seem likely to be helpful given multiple fluid collections    Sodium trending down.  Creatinine overall stable around 1.6.    Physical Exam   Vital Signs: Temp: 97.9  F (36.6  C)  Temp src: Oral BP: 99/42 Pulse: 70   Resp: 18 SpO2: 100 % O2 Device: None (Room air)    Weight: 121 lbs 7.58 oz      GEN:  Alert, awake, min conversant  HEENT: Temporal wasting, no scleral icterus, no nasal discharge  CV:  Regular rate and rhythm, no loud murmur or JVD.  S1 + S2 noted  LUNGS:  Clear to auscultation ant/lat bilaterally without rales/rhonchi/wheezing/retractions.  Symmetric chest rise on inhalation noted.  ABD:  + bowel sounds, soft, non-tender to light palpation throughout; mod distended.  No clear rebound/guarding/rigidity.  EXT:  1+ pitting pretibial edema bilaterally.  No cyanosis.    SKIN:  Dry to touch, no exanthems noted in the visualized areas.    Medical Decision Making       54 MINUTES SPENT BY ME on the date of service doing chart review, history, exam, documentation & further activities per the note.      This total time includes time spent on the phone discussing case and plan of care with oncology and IR this am  Data   Medications   Current Facility-Administered Medications   Medication Dose Route Frequency Provider Last Rate Last Admin    dextrose 5% infusion   Intravenous Continuous Suze Barnhart  mL/hr at 07/08/25 1156 New Bag at 07/08/25 1156     Current Facility-Administered Medications   Medication Dose Route Frequency Provider Last Rate Last Admin    heparin ANTICOAGULANT injection 5,000 Units  5,000 Units Subcutaneous Q12H Suze Barnhart DO   5,000 Units at 07/08/25 0515    mirtazapine (REMERON) tablet 15 mg  15 mg Oral At Bedtime Jane Rhodes DO   15 mg at 07/07/25 2209    senna-docusate (SENOKOT-S/PERICOLACE) 8.6-50 MG per tablet 1 tablet  1 tablet Oral BID Ham Nuñez APRN CNP   1 tablet at 07/02/25 2035    Or    senna-docusate (SENOKOT-S/PERICOLACE) 8.6-50 MG per tablet 2 tablet  2 tablet Oral BID Ham Nuñez APRN CNP   2 tablet at 07/04/25 2020    sodium chloride (PF) 0.9% PF flush 3 mL  3 mL Intracatheter Q8H VERONICA Nuñez  "Ham Perez, APRN CNP   3 mL at 07/03/25 2114     Labs and Imaging results below reviewed today.  Recent Labs   Lab 07/08/25 0716 07/07/25 0632 07/06/25 0658   WBC 16.4* 15.9* 16.0*   HGB 9.2* 9.0* 8.9*   HCT 26.3* 25.4* 25.1*   * 101* 101*   * 144* 158     Recent Labs   Lab 07/08/25 0716 07/07/25 2017 07/07/25 1624 07/07/25 0632 07/07/25 0110 07/06/25 1722 07/06/25 0658   * 156* 156* 156*  --   --  156*   POTASSIUM 3.1*  --   --  3.7  3.7 3.4   < > 3.2*   CHLORIDE 122*  --   --  126*  --   --  127*   CO2 19*  --   --  20*  --   --  20*   ANIONGAP 11  --   --  10  --   --  9   *  --   --  151*  --   --  158*   BUN 41.9*  --   --  41.6*  --   --  47.7*   CR 1.61*  --   --  1.56*  --   --  1.74*   GFRESTIMATED 48*  --   --  50*  --   --  44*   SANDRA 10.3  --   --  10.8*  --   --  10.9*    < > = values in this interval not displayed.     Recent Labs   Lab 07/08/25 0716 07/07/25 2017 07/07/25 1624 07/07/25 0632 07/07/25 0110 07/06/25 1722 07/06/25 0658   * 156* 156* 156*  --   --  156*   POTASSIUM 3.1*  --   --  3.7  3.7 3.4   < > 3.2*   CHLORIDE 122*  --   --  126*  --   --  127*   CO2 19*  --   --  20*  --   --  20*   ANIONGAP 11  --   --  10  --   --  9   *  --   --  151*  --   --  158*   BUN 41.9*  --   --  41.6*  --   --  47.7*   CR 1.61*  --   --  1.56*  --   --  1.74*   GFRESTIMATED 48*  --   --  50*  --   --  44*   SANDRA 10.3  --   --  10.8*  --   --  10.9*   MAG 2.1  --   --  2.3  --   --  2.4*   PHOS 2.5 2.5  --  1.8*  --   --  2.1*   PROTTOTAL 4.2*  --   --  4.3*  --   --  4.1*   ALBUMIN 2.4*  --   --  2.6*  --   --  2.6*   BILITOTAL 23.1*  --   --  24.6*  --   --  25.0*   ALKPHOS 286*  --   --  273*  --   --  278*   AST 88*  --   --  82*  --   --  83*   *  --   --  141*  --   --  142*    < > = values in this interval not displayed.     No results for input(s): \"INR\" in the last 168 hours.    No results found for this or any previous visit (from " the past 24 hours).

## 2025-07-08 NOTE — CONSULTS
Palliative Care Consultation Note  [facility]      Patient: Zoltan Dalton  Date of Admission:  6/24/2025    Requesting Clinician / Team: Hospitalist Suze Barnhart DO   Reason for consult: Goals of care       Recommendations & Counseling     GOALS OF CARE:   Life-prolonging without limits. Spouse and son plan to visit later today in attempt to see if the patient is able to provide some insight in decision making.   Surrogate decision maker previously noted by patient to be his son Zoltan Jarvis. - patient states he would want Zoltan Jarvis to decide, with the medical team, when discontinuation of life-sustaining treatments should be sought.      ADVANCE CARE PLANNING:  No health care directive on file. Per system policy, Surrogate Decision-makers for Patients With Diminished Decision-making Capacity offers guidance on possible decision-makers. Zoltan Dalton Jr. has been identified as a surrogate decision maker.   There is no POLST form on file, defer to patient and/or next of kin for decisions   Code status: Full Code     MEDICAL MANAGEMENT:   We are not actively managing symptoms at this time.     PSYCHOSOCIAL/SPIRITUAL SUPPORT:  Family : Spouse, son and there are step-children     Palliative Care will continue to follow. Thank you for the consult and allowing us to aid in the care of Zoltan Dalton.    These recommendations have been discussed with Hospitalist Akash Vazquez MD and bedside nurse ALEX Sullivan.    GUILHERME Manriquez CNS  [facility], Palliative Care  Securely message with the [employer] Web Console (learn more here) or  Text page via [facility] Paging/Directory         Assessment      Zoltan Dalton is a 60 year old male with a past medical history of tobacco use who presented on 6/24/25 with multiple days of abdominal pain.  Associated symptoms of early satiety, 40 lb weight loss, epigastric pain/reflux.     Patient does not follow with primary care provider at baseline,  "no known medical history.       Imaging obtained for pain evaluation, found to have lesions consistent with hepatic malignancy. Liver enzymes elevated, acute kidney injury, colonic mass, intrahepatic biliary ductal dilatation, pulmonary nodules all appreciate on work up.  FNA obtained for tumor work up and oncology consulted.  GI consulted ERCP completed with biliary stenting.  [facility]-rectal surgery do not recommend surgical intervention. Oncology discussed with son \"patient will most likely never be a good candidate for palliative systemic therapy for his metastatic cholangiocarcinoma.\"       Today, the patient was seen for:  #Goals of care  #Patient support    History of Present Illness   Met with Zoltan as he was resting in bed. He denied complaint offering \"Not right now. I'm fine.\" Yet in asking about his plan for the future it was the same response \"Not right now. I'm fine.\" In asking about his wife it was again the same response.    Phoned the patient's spouse Katheryn \"Liliana\" Sha at [phone] leaving a message to contact our office at her earliest convenience.       Phoned the patient's son, Jr Zoltan at [phone]. I introduced our role as an extra layer of support and how we help patients and families dealing with serious, potentially life-limiting illnesses. I explained the composition of the palliative care team.  Palliative care helps patients and families navigate their care while focusing on the whole person; providing emotional, social and spiritual support  Palliative care often assists with symptom management, information sharing about what to expect from the illness, available treatment options and what effect those options may have on the disease course, and provide effective communication and caring support. Zoltan acknowledged \"The oncologist called yesterday. I know he has two choices. To get better and get eating which doesn't sound very likely or go with hospice.\" Zoltan plans to " come to the hospital later today to attempt to speak with his father in making decisions. In discussing the hospice benefit, he acknowledged his grandparents were in hospice, so he does have some familiarity.      The patient's spouse, Liliana returned my phone call. I explained the role of palliative care. Liliana acknowledged their son has been coming to the hospital and getting updates. She explained she would be at the hospital later this afternoon.       Medications:  Reviewed this patient's medication profile and medications from this hospitalization. [place] Board of Pharmacy Data Base Reviewed: Yes:   reviewed - no record of controlled substances prescribed..    ROS:  Comprehensive ROS is reviewed and is negative except as here & per HPI:     Physical Exam   Vital Signs with Ranges  Temp:  [97.9  F (36.6  C)-98.8  F (37.1  C)] 97.9  F (36.6  C)  Pulse:  [70-83] 70  Resp:  [18-19] 18  BP: ()/(42-61) 99/42  SpO2:  [98 %-100 %] 100 %  Wt Readings from Last 10 Encounters:   07/02/25 55.1 kg (121 lb 7.6 oz)   04/06/21 83.9 kg (185 lb)   03/04/21 85.3 kg (188 lb)     121 lbs 7.58 oz    PHYSICAL EXAM:  GEN:  Cachetic and frail male. Alert, oriented to self only, appears fatigued.   HEENT:  Normocephalic/atraumatic, no scleral icterus, no nasal discharge, mouth moist.  CV:  RRR, S1, S2; no murmurs or other irregularities noted.  +3 DP/PT pulses bilatererally; with pedal edema bilaterally.  RESP:  Clear to auscultation bilaterally without rales/rhonchi/wheezing/retractions.  Symmetric chest rise on inhalation noted.  Normal respiratory effort.  ABD:  Rounded, soft, non-tender/non-distended.  +BS  EXT:  Edema & pulses as noted above.  CMS intact x 4.     M/S:   No wincing or grimace with palpation of extremities and abdomen.    SKIN:  Warm and dry to touch.    Data reviewed:  Recent Results (from the past 24 hours)   Sodium   Result Value Ref Range    Sodium 156 (H) 135 - 145 mmol/L   Phosphorus   Result Value  Ref Range    Phosphorus 2.5 2.5 - 4.5 mg/dL   Sodium   Result Value Ref Range    Sodium 156 (H) 135 - 145 mmol/L   Magnesium   Result Value Ref Range    Magnesium 2.1 1.7 - 2.3 mg/dL   Hepatic panel   Result Value Ref Range    Protein Total 4.2 (L) 6.4 - 8.3 g/dL    Albumin 2.4 (L) 3.5 - 5.2 g/dL    Bilirubin Total 23.1 (HH) <=1.2 mg/dL    Alkaline Phosphatase 286 (H) 40 - 150 U/L    AST 88 (H) 0 - 45 U/L     (H) 0 - 70 U/L    Bilirubin Direct 18.31 (H) 0.00 - 0.30 mg/dL   CBC with platelets   Result Value Ref Range    WBC Count 16.4 (H) 4.0 - 11.0 10e3/uL    RBC Count 2.59 (L) 4.40 - 5.90 10e6/uL    Hemoglobin 9.2 (L) 13.3 - 17.7 g/dL    Hematocrit 26.3 (L) 40.0 - 53.0 %     (H) 78 - 100 fL    MCH 35.5 (H) 26.5 - 33.0 pg    MCHC 35.0 31.5 - 36.5 g/dL    RDW 26.7 (H) 10.0 - 15.0 %    Platelet Count 122 (L) 150 - 450 10e3/uL   Basic metabolic panel   Result Value Ref Range    Sodium 152 (H) 135 - 145 mmol/L    Potassium 3.1 (L) 3.4 - 5.3 mmol/L    Chloride 122 (H) 98 - 107 mmol/L    Carbon Dioxide (CO2) 19 (L) 22 - 29 mmol/L    Anion Gap 11 7 - 15 mmol/L    Urea Nitrogen 41.9 (H) 8.0 - 23.0 mg/dL    Creatinine 1.61 (H) 0.67 - 1.17 mg/dL    GFR Estimate 48 (L) >60 mL/min/1.73m2    Calcium 10.3 8.8 - 10.4 mg/dL    Glucose 153 (H) 70 - 99 mg/dL   Phosphorus   Result Value Ref Range    Phosphorus 2.5 2.5 - 4.5 mg/dL       MANAGEMENT DISCUSSED with the following over the past 24 hours: Hospitalist Akash Vazquez MD and bedside nurse Laurie, RN.   70 MINUTES SPENT BY ME on the date of service doing chart review, history, exam, documentation & further activities per the note.

## 2025-07-08 NOTE — PLAN OF CARE
"Goal Outcome Evaluation:      Plan of Care Reviewed With: patient          Outcome Evaluation: A&Ox2. Lethargic. Up to bathroom with assist of one. IV fluids running.          Problem: Adult Inpatient Plan of Care  Goal: Plan of Care Review  Description: The Plan of Care Review/Shift note should be completed every shift.  The Outcome Evaluation is a brief statement about your assessment that the patient is improving, declining, or no change.  This information will be displayed automatically on your shift  note.  Outcome: Adequate for Care Transition  Flowsheets (Taken 7/7/2025 2304)  Outcome Evaluation: A&Ox2. Lethargic. Up to bathroom with assist of one. IV fluids running.  Plan of Care Reviewed With: patient  Goal: Patient-Specific Goal (Individualized)  Description: You can add care plan individualizations to a care plan. Examples of Individualization might be:  \"Parent requests to be called daily at 9am for status\", \"I have a hard time hearing out of my right ear\", or \"Do not touch me to wake me up as it startles  me\".  Outcome: Adequate for Care Transition  Goal: Absence of Hospital-Acquired Illness or Injury  Outcome: Adequate for Care Transition  Intervention: Identify and Manage Fall Risk  Recent Flowsheet Documentation  Taken 7/7/2025 2042 by Laila Oropeza, RN  Safety Promotion/Fall Prevention:   activity supervised   safety round/check completed   supervised activity  Intervention: Prevent Infection  Recent Flowsheet Documentation  Taken 7/7/2025 2042 by Laila Oropeza, RN  Infection Prevention:   hand hygiene promoted   rest/sleep promoted   single patient room provided  Goal: Optimal Comfort and Wellbeing  Outcome: Adequate for Care Transition  Goal: Readiness for Transition of Care  Outcome: Adequate for Care Transition     Problem: Fall Injury Risk  Goal: Absence of Fall and Fall-Related Injury  Outcome: Adequate for Care Transition  Intervention: Identify and Manage Contributors  Recent " Flowsheet Documentation  Taken 7/7/2025 2042 by Laila Oropeza RN  Medication Review/Management: medications reviewed  Intervention: Promote Injury-Free Environment  Recent Flowsheet Documentation  Taken 7/7/2025 2042 by Laila Oropeza RN  Safety Promotion/Fall Prevention:   activity supervised   safety round/check completed   supervised activity     Problem: Acute Kidney Injury/Impairment  Goal: Fluid and Electrolyte Balance  Outcome: Adequate for Care Transition  Goal: Improved Oral Intake  Outcome: Adequate for Care Transition  Goal: Effective Renal Function  Outcome: Adequate for Care Transition  Intervention: Monitor and Support Renal Function  Recent Flowsheet Documentation  Taken 7/7/2025 2042 by Laila Oropeza RN  Medication Review/Management: medications reviewed     Problem: Liver Failure  Goal: Optimal Coping with Liver Failure  Outcome: Adequate for Care Transition  Goal: Absence of Bleeding  Outcome: Adequate for Care Transition  Goal: Fluid and Electrolyte Balance  Outcome: Adequate for Care Transition  Goal: Optimal Gastrointestinal Function  Outcome: Adequate for Care Transition  Goal: Blood Glucose Level Within Target Range  Outcome: Adequate for Care Transition  Goal: Hemodynamic Stability  Outcome: Adequate for Care Transition  Goal: Absence of Infection Signs and Symptoms  Outcome: Adequate for Care Transition  Goal: Optimal Neurologic Function  Outcome: Adequate for Care Transition  Goal: Improved Oral Intake  Outcome: Adequate for Care Transition  Goal: Optimal Pain Control, Comfort and Function  Outcome: Adequate for Care Transition  Goal: Effective Oxygenation and Ventilation  Outcome: Adequate for Care Transition     Problem: Gas Exchange Impaired  Goal: Optimal Gas Exchange  Outcome: Adequate for Care Transition

## 2025-07-08 NOTE — PLAN OF CARE
"Pt is alert and oriented x2 to self and place. VSS on room air. Pt is a 1 person assist. Regular diet. PIV CDI, no signs of inflammation or infiltration; D5 infusing at 125 mL/hr. Onc, nephro, palliative, and social work onboard. K, Mg, PH protocol. Discharge plan is still in progress. Plan of care is continuing.    Goal Outcome Evaluation:      Plan of Care Reviewed With: patient    Overall Patient Progress: no change    Outcome Evaluation: plan of care is continuing      Problem: Adult Inpatient Plan of Care  Goal: Plan of Care Review  Description: The Plan of Care Review/Shift note should be completed every shift.  The Outcome Evaluation is a brief statement about your assessment that the patient is improving, declining, or no change.  This information will be displayed automatically on your shift  note.  Outcome: Not Progressing  Flowsheets (Taken 7/8/2025 0540)  Outcome Evaluation: plan of care is continuing  Plan of Care Reviewed With: patient  Overall Patient Progress: no change  Goal: Patient-Specific Goal (Individualized)  Description: You can add care plan individualizations to a care plan. Examples of Individualization might be:  \"Parent requests to be called daily at 9am for status\", \"I have a hard time hearing out of my right ear\", or \"Do not touch me to wake me up as it startles  me\".  Outcome: Not Progressing  Goal: Absence of Hospital-Acquired Illness or Injury  Outcome: Not Progressing  Intervention: Identify and Manage Fall Risk  Recent Flowsheet Documentation  Taken 7/8/2025 0122 by Robyn Dalton, RN  Safety Promotion/Fall Prevention:   room near nurse's station   safety round/check completed  Intervention: Prevent Skin Injury  Recent Flowsheet Documentation  Taken 7/8/2025 0122 by Robyn Dalton, RN  Body Position: position changed independently  Goal: Optimal Comfort and Wellbeing  Outcome: Not Progressing  Goal: Readiness for Transition of Care  Outcome: Not Progressing     Problem: Liver " Failure  Goal: Optimal Coping with Liver Failure  Outcome: Not Progressing  Goal: Absence of Bleeding  Outcome: Not Progressing  Goal: Fluid and Electrolyte Balance  Outcome: Not Progressing  Goal: Optimal Gastrointestinal Function  Outcome: Not Progressing  Goal: Blood Glucose Level Within Target Range  Outcome: Not Progressing  Goal: Hemodynamic Stability  Outcome: Not Progressing  Goal: Absence of Infection Signs and Symptoms  Outcome: Not Progressing  Goal: Optimal Neurologic Function  Outcome: Not Progressing  Intervention: Monitor and Optimize Neurologic Status  Recent Flowsheet Documentation  Taken 7/8/2025 0122 by Robyn Dalton RN  Head of Bed ([facility]) Positioning: HOB at 20-30 degrees  Goal: Improved Oral Intake  Outcome: Not Progressing  Goal: Optimal Pain Control, Comfort and Function  Outcome: Not Progressing  Goal: Effective Oxygenation and Ventilation  Outcome: Not Progressing  Intervention: Optimize Oxygenation and Ventilation  Recent Flowsheet Documentation  Taken 7/8/2025 0122 by Robyn Dalton RN  Head of Bed ([facility]) Positioning: HOB at 20-30 degrees

## 2025-07-08 NOTE — CONSULTS
"CLINICAL NUTRITION SERVICES - BRIEF NOTE    RD received provider consult for - \"malnutrition in setting of new diagnosis of malignancy\"  RD team already following. Please see notes from 6/27, 6/28, 7/1, and 7/4 for full assessments.  Severe malnutrition (present on admission) has been documented.    Pt continuing to have very poor intake. 0-25% intakes w/ poor appetite documented.  Pt has Ensure HP Vanilla coming TID at meal times.  Documented to also like Ensure Clear Apple - added at 10 am and 2 pm.  Extremely lethargic, so unsure he will be awake enough to accept supplements this frequently throughout the day, but hopeful to provide as many opportunities for intake as possible. Please alert RD team if supplements are piling up in room and orders can be adjusted.    Pt has been on Remeron since 6/28.    Pt hopes to remain restorative but, per hospitalist note from 7/4, does NOT want tube feedings.    At this point, we are maximized from a nutrition standpoint unless pt changes mind on nutrition support.  RD team will continue to follow.  Consult completed.      Pennie Cisse RD, LD  Clinical Dietitian  [facility] Message [facility]: \"Dietitian [[place]]\"  Office Phone: [phone]    "

## 2025-07-08 NOTE — CONSULTS
[facility]    RENAL CONSULTATION NOTE    REFERRING MD:  Dr. Barnhart    REASON FOR CONSULTATION:  hypernatremia    DATE OF CONSULTATION: 07/08/25    SHORTHAND KEY FOR MY NOTES:  c = with, s = without, p = after, a = before, x = except, asx = asymptomatic, tx = transplant or treatment, sx = symptoms or symptomatic, cx = canceled or culture, rxn = reaction, yday = yesterday, nl = normal, abx = antibiotics, fxn = function, dx = diagnosis, dz = disease, m/h = melena/hematochezia, c/d/l/ha = cramping/dizziness/lightheadedness/headache, d/c = discharge or diarrhea/constipation, f/c/n/v = fevers/chills/nausea/vomiting, cp/sob = chest pain/shortness of breath, tbv = total body volume, rxn = reaction, tdc = tunneled dialysis catheter, pta = prior to admission, hd = hemodialysis, pd = peritoneal dialysis, hhd = home hemodialysis, edw = estimated dry wt    HPI: Zoltan Dalton is a 60 year old male c h/o metastatic cholangiocarcinoma who was admitted on 6/24/2025 c abd pain now has hypernatremia and PIYUSH.  Notes from Gilberto Diana (ER), Pardeep (H/O), [facility] Nu[facility]ez, CHATO (Hosp) were reviewed.    Pt presented c abd pain, 40# wt loss, and dark urine and was found to have metastatic cholangiocarcinoma.  He initially had a Cr of 2.2 and his UA showed a lot of hyaline casts + gran casts.  With fluids, the Cr improved to the mid-to-high 1s where it remains now.      His Na vitor steadily from 134 on admission to 156 yday.  He hasn't been eating/drinking much.  He was started on D5 last night and today the Na is 152.  He doesn't feel well and has abd pain.      ROS:  A complete review of systems was performed and is x as noted above.    PMH:    History reviewed. No pertinent past medical history.  PSH:    Past Surgical History:   Procedure Laterality Date    COLONOSCOPY      COLONOSCOPY N/A 04/06/2021    Procedure: COLONOSCOPY (fv);  Surgeon: Ambrose Vargas MD;  Location:  GI     COLONOSCOPY N/A 6/26/2025    Procedure: Colonoscopy;  Surgeon: Ramon Colbert MD;  Location: [facility] OR    ENDOSCOPIC RETROGRADE CHOLANGIOPANCREATOGRAM N/A 6/26/2025    Procedure: Endoscopic retrograde cholangiopancreatogram, sphincterotomy with stent placement x2;  Surgeon: Ramon Colbert MD;  Location: [facility] OR    ENDOSCOPIC RETROGRADE CHOLANGIOPANCREATOGRAM N/A 7/2/2025    Procedure: ENDOSCOPIC RETROGRADE CHOLANGIOPANCREATOGRAPHY, balloon dilation and stent exchange;  Surgeon: Bud Delgadillo MD;  Location: [facility] OR    ESOPHAGOSCOPY, GASTROSCOPY, DUODENOSCOPY (EGD), COMBINED N/A 6/26/2025    Procedure: ESOPHAGOGASTRODUODENOSCOPY, WITH FINE NEEDLE ASPIRATION BIOPSY, ENDOSCOPIC ULTRASOUND GUIDANCE;  Surgeon: Ramon Colbert MD;  Location: [facility] OR     MEDICATIONS:    Current Facility-Administered Medications   Medication Dose Route Frequency Provider Last Rate Last Admin    heparin ANTICOAGULANT injection 5,000 Units  5,000 Units Subcutaneous Q12H Suze Barnhart DO   5,000 Units at 07/08/25 0515    mirtazapine (REMERON) tablet 15 mg  15 mg Oral At Bedtime Jane Rhodes DO   15 mg at 07/07/25 2209    potassium chloride glenn ER (KLOR-CON M20) CR tablet 40 mEq  40 mEq Oral Once kAash Vazquez MD        senna-docusate (SENOKOT-S/PERICOLACE) 8.6-50 MG per tablet 1 tablet  1 tablet Oral BID Ham Nuñez APRN CNP   1 tablet at 07/02/25 2035    Or    senna-docusate (SENOKOT-S/PERICOLACE) 8.6-50 MG per tablet 2 tablet  2 tablet Oral BID Ham Nuñez APRN CNP   2 tablet at 07/04/25 2020    sodium chloride (PF) 0.9% PF flush 3 mL  3 mL Intracatheter Q8H [facility] Ham Nuñez APRN CNP   3 mL at 07/03/25 2114     ALLERGIES:    Allergies as of 06/24/2025    (No Known Allergies)     FH:    Family History   Problem Relation Age of Onset    Colon Cancer No family hx of      SH:    Social History     Socioeconomic History    Marital status:       Spouse name: Not on file    Number of children: Not on file    Years of education: Not on file    Highest education level: Not on file   Occupational History    Not on file   Tobacco Use    Smoking status: Former     Current packs/day: 0.00     Average packs/day: 1 pack/day for 41.0 years (41.0 ttl pk-yrs)     Types: Cigarettes     Start date:      Quit date: 2020     Years since quittin.5    Smokeless tobacco: Never   Substance and Sexual Activity    Alcohol use: Yes     Comment: 6 per week    Drug use: Never    Sexual activity: Not on file   Other Topics Concern    Parent/sibling w/ CABG, MI or angioplasty before 65F 55M? Not Asked   Social History Narrative    Not on file     Social Drivers of Health     Financial Resource Strain: Low Risk  (2025)    Financial Resource Strain     Within the past 12 months, have you or your family members you live with been unable to get utilities (heat, electricity) when it was really needed?: No   Food Insecurity: Low Risk  (2025)    Food Insecurity     Within the past 12 months, did you worry that your food would run out before you got money to buy more?: No     Within the past 12 months, did the food you bought just not last and you didn t have money to get more?: No   Transportation Needs: Low Risk  (2025)    Transportation Needs     Within the past 12 months, has lack of transportation kept you from medical appointments, getting your medicines, non-medical meetings or appointments, work, or from getting things that you need?: No   Physical Activity: Not on file   Stress: Not on file   Social Connections: Not on file   Interpersonal Safety: Low Risk  (2025)    Interpersonal Safety     Do you feel physically and emotionally safe where you currently live?: Yes     Within the past 12 months, have you been hit, slapped, kicked or otherwise physically hurt by someone?: No     Within the past 12 months, have you been humiliated or emotionally  "abused in other ways by your partner or ex-partner?: No   Housing Stability: Low Risk  (6/24/2025)    Housing Stability     Do you have housing? : Yes     Are you worried about losing your housing?: No     PHYSICAL EXAM:    BP 99/42 (BP Location: Right arm)   Pulse 70   Temp 97.9  F (36.6  C) (Oral)   Resp 18   Ht 1.727 m (5' 8\")   Wt 55.1 kg (121 lb 7.6 oz)   SpO2 100%   BMI 18.47 kg/m      GENERAL: awake, alert, NAD  HEENT:  normocephalic, cachectic  CV: RRR, nl S1/S2; no ble edema  RESP:  CTA B c decent efforts  GI: abd s/nd, tender to palp  SKIN: + jaundice  NEURO:  strength weak, symmetric  PSYCH: mood depressed, affect appropriate    LABS:      CBC RESULTS:     Recent Labs   Lab 07/08/25  0716 07/07/25  0632 07/06/25  0658 07/05/25  0645 07/04/25  0702 07/03/25  0706   WBC 16.4* 15.9* 16.0* 17.3* 17.7* 19.5*   RBC 2.59* 2.52* 2.49* 2.92* 2.99* 2.93*   HGB 9.2* 9.0* 8.9* 10.3* 10.5* 10.3*   HCT 26.3* 25.4* 25.1* 29.3* 29.3* 28.6*   * 144* 158 209 209 220     BMP RESULTS:  Recent Labs   Lab 07/08/25  0716 07/07/25 2017 07/07/25  1624 07/07/25  0632 07/07/25  0110 07/06/25  1722 07/06/25  0658 07/05/25  2156 07/05/25  1812 07/05/25  1301 07/05/25  1101 07/05/25  0645 07/05/25  0035 07/04/25  0702 07/03/25  0706   * 156* 156* 156*  --   --  156* 156*   < > 156*   < > 156*  --  153* 149*   POTASSIUM 3.1*  --   --  3.7  3.7 3.4 3.2* 3.2*  --   --  3.8  --  3.4  3.4   < > 3.4 3.6   CHLORIDE 122*  --   --  126*  --   --  127*  --   --   --   --  125*  --  120* 116*   CO2 19*  --   --  20*  --   --  20*  --   --   --   --  21*  --  23 22   BUN 41.9*  --   --  41.6*  --   --  47.7*  --   --   --   --  47.0*  --  48.9* 47.3*   CR 1.61*  --   --  1.56*  --   --  1.74*  --   --   --   --  1.61*  --  1.74* 1.66*   *  --   --  151*  --   --  158*  --   --   --   --  120*  --  119* 115*   SANDRA 10.3  --   --  10.8*  --   --  10.9*  --   --   --   --  11.2*  --  11.2* 11.3*    < > = values in this " interval not displayed.     INRNo lab results found in last 7 days.     DIAGNOSTICS:  Personally reviewed:  Chest/Abd/Pelv CT - nl-appearing kidneys, no hydro, B pleural effusions    A/P:  Zoltan Dalton is a 60 year old male c severe hypernatremia.    1.  Severe hypernatremia.  Pt's free water deficit is ~3.5L from poor intake.  He has started to improve c the initiation of hypotonic fluids.  He is asx.  A.  Agree c D5 @ 100 ml/h.  B.  Follow Na q 12h.  C.  If the next Na is < 145, then stop the D5.  D.  Check labs in AM and adjust fluids prn.    2.  PIYUSH.  Pt's Cr has stable in the 1.5-1.7 range.  He likely has some ATN given the gran casts noted initially.  He also prob has an element of HRS II.  A.  Follow labs.    3.  Metastatic cholangiocarcinoma.  Pt's liver fxn is not good.  Unfortunately, it doesn't appear that he has many options available.  A.  Per H/O.    4.  Code Status.  Pt is full code.    A.  Further discussion per Palliative, Hosp teams.    Thank you for this consultation. I will not follow c you.  Please call if any specific questions.  Case d/w Dr. Vazquez.    Attestation:   I have reviewed today's relevant vital signs, notes, medications, labs and imaging.    Peña Esteban MD  [facility] - Nephrology  [phone]

## 2025-07-08 NOTE — PROGRESS NOTES
SPIRITUAL HEALTH SERVICES - Progress Note  [facility] Med/Surg 3    Referral Source: Assess pt's emotional/spiritual resources and needs per his length of stay.     Briefly oriented pt Zoltan to [facility].  He presented as being somnolent but agreed to answering a few assessment questions.  Zoltan named his son as being core to his support network.  He identifies as being spiritual; his spirituality is more private than crow community based.  Zoltan welcomed my offer to keep him in prayer but declined prayer in the moment.    Plan: Informed pt how he can request further  support.  This author and other chaplains remain available per pt/family request.     David Garcia M.Div., [facility]  Staff     SHS available 24/7 for emergent requests/referrals, either by paging the on-call  or by entering an ASAP/STAT consult in [facility], which will also page the on-call .

## 2025-07-08 NOTE — PLAN OF CARE
Provided cares for pt from [phone]. Pt alert to self and situation. C/o abdominal pain, pt refused pain medications until significant other came in to see pt and encouraged pt to take some medication to help with pain. PRN dilaudid given. No appetite today, pt only drank a few sips of water, orange juice, and ensure with medications. RN tried to encourage pt multiple times today to try and eat and drink and pt refused. Pt refused to work with therapy today. K replaced x2. Voiding, incontinent at times. BM x1 per pt. SBA with gait belt to bathroom. IVF infusing. Continue to monitor pain and encourage oral intake. Plan for MD to meet with significant other and son tomorrow around 1100.       Plan of Care Reviewed With: patient    Overall Patient Progress: decliningOverall Patient Progress: declining    Outcome Evaluation: Pt alert to self a    Problem: Adult Inpatient Plan of Care  Goal: Optimal Comfort and Wellbeing  7/8/2025 1759 by Laurie Soriano RN  Outcome: Not Progressing  7/8/2025 1759 by Laurie Soriano RN  Outcome: Progressing  7/8/2025 1639 by Laurie Soriano RN  Outcome: Progressing  7/8/2025 1638 by Laurie Soriano RN  Outcome: Progressing  7/8/2025 1635 by Laurie Soriano RN  Outcome: Progressing  Intervention: Monitor Pain and Promote Comfort  Recent Flowsheet Documentation  Taken 7/8/2025 1439 by Laurie Soriano RN  Pain Management Interventions: medication offered but refused  Taken 7/8/2025 1030 by Laurie Soriano RN  Pain Management Interventions: medication offered but refused  Goal: Readiness for Transition of Care  7/8/2025 1759 by Laurie Soriano RN  Outcome: Not Progressing  7/8/2025 1759 by Laurie Soriano RN  Outcome: Progressing  7/8/2025 1639 by Laurie Soriano RN  Outcome: Not Progressing  7/8/2025 1638 by Laurie Soriano RN  Outcome: Progressing  7/8/2025 1635 by Laurie Soriano RN  Outcome: Not Progressing     Problem: Liver  Failure  Goal: Fluid and Electrolyte Balance  7/8/2025 1759 by Laurie Soriano RN  Outcome: Not Progressing  7/8/2025 1759 by Laurie Soriano RN  Outcome: Progressing  7/8/2025 1639 by Laurie Soriano RN  Outcome: Not Progressing  7/8/2025 1638 by Laurie Soriano RN  Outcome: Progressing  7/8/2025 1635 by Laurie Soriano RN  Outcome: Not Progressing  Intervention: Monitor and Manage Fluid and Electrolyte Balance  Recent Flowsheet Documentation  Taken 7/8/2025 1030 by Laurie Soriano RN  Fluid/Electrolyte Management: electrolyte supplement adjusted  Goal: Improved Oral Intake  7/8/2025 1759 by Laurie Soriano RN  Outcome: Not Progressing  7/8/2025 1759 by Laurie Soriano RN  Outcome: Progressing  7/8/2025 1639 by Laurie Soriano RN  Outcome: Not Progressing  7/8/2025 1638 by Laurie Soriano RN  Outcome: Progressing  7/8/2025 1635 by Laurie Soriano RN  Outcome: Not Progressing  Goal: Optimal Pain Control, Comfort and Function  7/8/2025 1759 by Laurie Soriano RN  Outcome: Not Progressing  7/8/2025 1759 by Laurie Soriano RN  Outcome: Progressing  7/8/2025 1639 by Laurie Soriano RN  Outcome: Not Progressing  7/8/2025 1638 by Laurie Soriano RN  Outcome: Progressing  7/8/2025 1635 by Laurie Soriano RN  Outcome: Not Progressing  Intervention: Prevent or Manage Pain  Recent Flowsheet Documentation  Taken 7/8/2025 1439 by Laurie Soriano RN  Pain Management Interventions: medication offered but refused  Taken 7/8/2025 1030 by Laurie Soriano RN  Pain Management Interventions: medication offered but refused

## 2025-07-09 LAB
ALBUMIN SERPL BCG-MCNC: 2.4 G/DL (ref 3.5–5.2)
ALP SERPL-CCNC: 257 U/L (ref 40–150)
ALT SERPL W P-5'-P-CCNC: 150 U/L (ref 0–70)
ANION GAP SERPL CALCULATED.3IONS-SCNC: 14 MMOL/L (ref 7–15)
AST SERPL W P-5'-P-CCNC: 82 U/L (ref 0–45)
BASOPHILS # BLD AUTO: 0 10E3/UL (ref 0–0.2)
BASOPHILS NFR BLD AUTO: 0 %
BILIRUB SERPL-MCNC: 24 MG/DL
BUN SERPL-MCNC: 44.1 MG/DL (ref 8–23)
CALCIUM SERPL-MCNC: 10.2 MG/DL (ref 8.8–10.4)
CHLORIDE SERPL-SCNC: 117 MMOL/L (ref 98–107)
CREAT SERPL-MCNC: 1.75 MG/DL (ref 0.67–1.17)
EGFRCR SERPLBLD CKD-EPI 2021: 44 ML/MIN/1.73M2
EOSINOPHIL # BLD AUTO: 0 10E3/UL (ref 0–0.7)
EOSINOPHIL NFR BLD AUTO: 0 %
ERYTHROCYTE [DISTWIDTH] IN BLOOD BY AUTOMATED COUNT: 26.7 % (ref 10–15)
GLUCOSE BLDC GLUCOMTR-MCNC: 78 MG/DL (ref 70–99)
GLUCOSE SERPL-MCNC: 162 MG/DL (ref 70–99)
HCO3 SERPL-SCNC: 17 MMOL/L (ref 22–29)
HCT VFR BLD AUTO: 27.6 % (ref 40–53)
HGB BLD-MCNC: 9.6 G/DL (ref 13.3–17.7)
IMM GRANULOCYTES # BLD: 0.1 10E3/UL
IMM GRANULOCYTES NFR BLD: 1 %
LYMPHOCYTES # BLD AUTO: 1.4 10E3/UL (ref 0.8–5.3)
LYMPHOCYTES NFR BLD AUTO: 10 %
MAGNESIUM SERPL-MCNC: 2 MG/DL (ref 1.7–2.3)
MCH RBC QN AUTO: 35.3 PG (ref 26.5–33)
MCHC RBC AUTO-ENTMCNC: 34.8 G/DL (ref 31.5–36.5)
MCV RBC AUTO: 102 FL (ref 78–100)
MONOCYTES # BLD AUTO: 0.3 10E3/UL (ref 0–1.3)
MONOCYTES NFR BLD AUTO: 2 %
NEUTROPHILS # BLD AUTO: 12.5 10E3/UL (ref 1.6–8.3)
NEUTROPHILS NFR BLD AUTO: 87 %
NRBC # BLD AUTO: 0.1 10E3/UL
NRBC BLD AUTO-RTO: 1 /100
PHOSPHATE SERPL-MCNC: 2.7 MG/DL (ref 2.5–4.5)
PLATELET # BLD AUTO: 96 10E3/UL (ref 150–450)
POTASSIUM SERPL-SCNC: 3.4 MMOL/L (ref 3.4–5.3)
PROT SERPL-MCNC: 3.9 G/DL (ref 6.4–8.3)
RBC # BLD AUTO: 2.72 10E6/UL (ref 4.4–5.9)
SODIUM SERPL-SCNC: 148 MMOL/L (ref 135–145)
WBC # BLD AUTO: 14.4 10E3/UL (ref 4–11)

## 2025-07-09 PROCEDURE — 250N000013 HC RX MED GY IP 250 OP 250 PS 637: Performed by: NURSE PRACTITIONER

## 2025-07-09 PROCEDURE — 36415 COLL VENOUS BLD VENIPUNCTURE: CPT | Performed by: INTERNAL MEDICINE

## 2025-07-09 PROCEDURE — 99207 PR NO BILLABLE SERVICE THIS VISIT: CPT | Performed by: INTERNAL MEDICINE

## 2025-07-09 PROCEDURE — 99233 SBSQ HOSP IP/OBS HIGH 50: CPT | Performed by: INTERNAL MEDICINE

## 2025-07-09 PROCEDURE — 82310 ASSAY OF CALCIUM: CPT | Performed by: INTERNAL MEDICINE

## 2025-07-09 PROCEDURE — 84100 ASSAY OF PHOSPHORUS: CPT | Performed by: INTERNAL MEDICINE

## 2025-07-09 PROCEDURE — 120N000001 HC R&B MED SURG/OB

## 2025-07-09 PROCEDURE — 99418 PROLNG IP/OBS E/M EA 15 MIN: CPT | Performed by: CLINICAL NURSE SPECIALIST

## 2025-07-09 PROCEDURE — 85004 AUTOMATED DIFF WBC COUNT: CPT | Performed by: INTERNAL MEDICINE

## 2025-07-09 PROCEDURE — 250N000011 HC RX IP 250 OP 636: Performed by: INTERNAL MEDICINE

## 2025-07-09 PROCEDURE — 83735 ASSAY OF MAGNESIUM: CPT | Performed by: INTERNAL MEDICINE

## 2025-07-09 PROCEDURE — 250N000011 HC RX IP 250 OP 636: Performed by: NURSE PRACTITIONER

## 2025-07-09 PROCEDURE — 258N000003 HC RX IP 258 OP 636: Performed by: INTERNAL MEDICINE

## 2025-07-09 PROCEDURE — 99233 SBSQ HOSP IP/OBS HIGH 50: CPT | Performed by: CLINICAL NURSE SPECIALIST

## 2025-07-09 PROCEDURE — 250N000013 HC RX MED GY IP 250 OP 250 PS 637: Performed by: CLINICAL NURSE SPECIALIST

## 2025-07-09 RX ORDER — CARBOXYMETHYLCELLULOSE SODIUM 5 MG/ML
1-2 SOLUTION/ DROPS OPHTHALMIC
Status: DISCONTINUED | OUTPATIENT
Start: 2025-07-09 | End: 2025-07-10 | Stop reason: HOSPADM

## 2025-07-09 RX ORDER — NALOXONE HYDROCHLORIDE 0.4 MG/ML
0.2 INJECTION, SOLUTION INTRAMUSCULAR; INTRAVENOUS; SUBCUTANEOUS
OUTPATIENT
Start: 2025-07-09

## 2025-07-09 RX ORDER — ATROPINE SULFATE 10 MG/ML
2 SOLUTION/ DROPS OPHTHALMIC EVERY 4 HOURS PRN
Status: DISCONTINUED | OUTPATIENT
Start: 2025-07-09 | End: 2025-07-10 | Stop reason: HOSPADM

## 2025-07-09 RX ORDER — NALOXONE HYDROCHLORIDE 0.4 MG/ML
0.4 INJECTION, SOLUTION INTRAMUSCULAR; INTRAVENOUS; SUBCUTANEOUS
OUTPATIENT
Start: 2025-07-09

## 2025-07-09 RX ORDER — FLUMAZENIL 0.1 MG/ML
0.2 INJECTION, SOLUTION INTRAVENOUS
OUTPATIENT
Start: 2025-07-09

## 2025-07-09 RX ORDER — ACETAMINOPHEN 325 MG/1
650 TABLET ORAL EVERY 6 HOURS PRN
Status: DISCONTINUED | OUTPATIENT
Start: 2025-07-09 | End: 2025-07-09

## 2025-07-09 RX ORDER — NALOXONE HYDROCHLORIDE 0.4 MG/ML
0.1 INJECTION, SOLUTION INTRAMUSCULAR; INTRAVENOUS; SUBCUTANEOUS
Status: DISCONTINUED | OUTPATIENT
Start: 2025-07-09 | End: 2025-07-10 | Stop reason: HOSPADM

## 2025-07-09 RX ORDER — BISACODYL 10 MG
10 SUPPOSITORY, RECTAL RECTAL
Status: DISCONTINUED | OUTPATIENT
Start: 2025-07-12 | End: 2025-07-10 | Stop reason: HOSPADM

## 2025-07-09 RX ORDER — CEFAZOLIN SODIUM 2 G/50ML
2 SOLUTION INTRAVENOUS
OUTPATIENT
Start: 2025-07-09

## 2025-07-09 RX ORDER — FENTANYL CITRATE 50 UG/ML
25-50 INJECTION, SOLUTION INTRAMUSCULAR; INTRAVENOUS EVERY 5 MIN PRN
Refills: 0 | OUTPATIENT
Start: 2025-07-09

## 2025-07-09 RX ORDER — NALOXONE HYDROCHLORIDE 0.4 MG/ML
0.2 INJECTION, SOLUTION INTRAMUSCULAR; INTRAVENOUS; SUBCUTANEOUS
Status: DISCONTINUED | OUTPATIENT
Start: 2025-07-09 | End: 2025-07-10 | Stop reason: HOSPADM

## 2025-07-09 RX ORDER — OXYCODONE HCL 20 MG/ML
10 CONCENTRATE, ORAL ORAL
Refills: 0 | Status: DISCONTINUED | OUTPATIENT
Start: 2025-07-09 | End: 2025-07-10 | Stop reason: HOSPADM

## 2025-07-09 RX ORDER — OXYCODONE HCL 20 MG/ML
5 CONCENTRATE, ORAL ORAL
Refills: 0 | Status: DISCONTINUED | OUTPATIENT
Start: 2025-07-09 | End: 2025-07-10 | Stop reason: HOSPADM

## 2025-07-09 RX ORDER — MINERAL OIL/HYDROPHIL PETROLAT
OINTMENT (GRAM) TOPICAL
Status: DISCONTINUED | OUTPATIENT
Start: 2025-07-09 | End: 2025-07-10 | Stop reason: HOSPADM

## 2025-07-09 RX ORDER — SENNOSIDES 8.6 MG
1 TABLET ORAL 2 TIMES DAILY PRN
Status: DISCONTINUED | OUTPATIENT
Start: 2025-07-09 | End: 2025-07-10 | Stop reason: HOSPADM

## 2025-07-09 RX ORDER — OLANZAPINE 5 MG/1
5 TABLET, ORALLY DISINTEGRATING ORAL EVERY 6 HOURS PRN
Status: DISCONTINUED | OUTPATIENT
Start: 2025-07-09 | End: 2025-07-10 | Stop reason: HOSPADM

## 2025-07-09 RX ORDER — ACETAMINOPHEN 650 MG/1
650 SUPPOSITORY RECTAL EVERY 6 HOURS PRN
Status: DISCONTINUED | OUTPATIENT
Start: 2025-07-09 | End: 2025-07-09

## 2025-07-09 RX ORDER — HALOPERIDOL 2 MG/ML
2 SOLUTION ORAL
Status: DISCONTINUED | OUTPATIENT
Start: 2025-07-09 | End: 2025-07-10 | Stop reason: HOSPADM

## 2025-07-09 RX ORDER — GLYCOPYRROLATE 0.2 MG/ML
0.2 INJECTION, SOLUTION INTRAMUSCULAR; INTRAVENOUS EVERY 4 HOURS PRN
Status: DISCONTINUED | OUTPATIENT
Start: 2025-07-09 | End: 2025-07-10 | Stop reason: HOSPADM

## 2025-07-09 RX ORDER — SALIVA STIMULANT COMB. NO.3
2 SPRAY, NON-AEROSOL (ML) MUCOUS MEMBRANE
Status: DISCONTINUED | OUTPATIENT
Start: 2025-07-09 | End: 2025-07-10 | Stop reason: HOSPADM

## 2025-07-09 RX ORDER — POTASSIUM CHLORIDE 20MEQ/15ML
40 LIQUID (ML) ORAL ONCE
Status: DISCONTINUED | OUTPATIENT
Start: 2025-07-09 | End: 2025-07-09

## 2025-07-09 RX ADMIN — HYDROMORPHONE HYDROCHLORIDE 2 MG: 2 TABLET ORAL at 08:40

## 2025-07-09 RX ADMIN — OLANZAPINE 5 MG: 5 TABLET, ORALLY DISINTEGRATING ORAL at 12:58

## 2025-07-09 RX ADMIN — OXYCODONE HYDROCHLORIDE 10 MG: 100 SOLUTION ORAL at 17:43

## 2025-07-09 RX ADMIN — DEXTROSE MONOHYDRATE: 50 INJECTION, SOLUTION INTRAVENOUS at 03:55

## 2025-07-09 RX ADMIN — HEPARIN SODIUM 5000 UNITS: 5000 INJECTION, SOLUTION INTRAVENOUS; SUBCUTANEOUS at 05:32

## 2025-07-09 RX ADMIN — HYDROMORPHONE HYDROCHLORIDE 0.4 MG: 0.2 INJECTION, SOLUTION INTRAMUSCULAR; INTRAVENOUS; SUBCUTANEOUS at 12:58

## 2025-07-09 RX ADMIN — SENNOSIDES AND DOCUSATE SODIUM 2 TABLET: 50; 8.6 TABLET ORAL at 08:40

## 2025-07-09 RX ADMIN — DEXTROSE MONOHYDRATE: 50 INJECTION, SOLUTION INTRAVENOUS at 11:10

## 2025-07-09 RX ADMIN — HYDROMORPHONE HYDROCHLORIDE 0.4 MG: 0.2 INJECTION, SOLUTION INTRAMUSCULAR; INTRAVENOUS; SUBCUTANEOUS at 11:11

## 2025-07-09 ASSESSMENT — ACTIVITIES OF DAILY LIVING (ADL)
ADLS_ACUITY_SCORE: 54
ADLS_ACUITY_SCORE: 55
ADLS_ACUITY_SCORE: 54
ADLS_ACUITY_SCORE: 55
ADLS_ACUITY_SCORE: 54
ADLS_ACUITY_SCORE: 55
ADLS_ACUITY_SCORE: 54
ADLS_ACUITY_SCORE: 54

## 2025-07-09 NOTE — PROGRESS NOTES
Oncology/Hematology Follow Up Note:    Assessment and Plan:  #1 Metastatic cholangiocarcinoma  - with liver and possible lung mets  - s/p stent placement for biliary obstruction  - CA 19-9 WNL, but CEA elevated to 43  - Pancreatic tail mass noted on MRI but nothing visualized during EUS.    - Biopsy showed adenocarcinoma, c/w cholangiocarcinoma  - Liver function tests have not improved at all since stent placement.     7/2- Repeat ERCP  - Biliary stent exchange. 15 cm x 10 [place] plastic stent was placed into the dilated segment of the  left intrahepatics. Previously placed biliary stent was removed.   - Liver function tests still have not improved.  Patient is also getting more lethargic and less interactive.  Not eating or drinking much  - Not a good candidate for percutaneous drain placement per IR  - Patient will most likely never be a good candidate for palliative systemic therapy for his metastatic cholangiocarcinoma.  Recommended hospice/comfort care to patient and his family     PLAN:  - Again emphasized my recommendation for hospice/comfort care.  Per review of records, the patient has decided to enroll in hospice.        Paul Roberto M.D.  [facility]  [phone]    Subjective:    Saw patient this morning.  He complained of right upper quadrant abdominal pain.  Not eating or drinking much.  Patient was very lethargic and not very interactive.  Was not able to answer most of my questions.      Labs:  All labs reviewed    CBC  Recent Labs   Lab 07/09/25  0727 07/08/25  0716 07/07/25  0632   WBC 14.4* 16.4* 15.9*   HGB 9.6* 9.2* 9.0*   * 102* 101*   PLT 96* 122* 144*       CMP  Recent Labs   Lab 07/09/25  0727 07/08/25  1947 07/08/25  1346 07/08/25  0716 07/07/25 2017 07/07/25  1624 07/07/25  0632 07/06/25  1722 07/06/25  0658   * 148* 149* 152* 156*   < > 156*  --  156*   POTASSIUM 3.4 3.8 3.3* 3.1*  --   --  3.7  3.7   < > 3.2*   CHLORIDE 117*  --   --  122*  --   --  126*  --  127*  "  CO2 17*  --   --  19*  --   --  20*  --  20*   ANIONGAP 14  --   --  11  --   --  10  --  9   *  --   --  153*  --   --  151*  --  158*   BUN 44.1*  --   --  41.9*  --   --  41.6*  --  47.7*   CR 1.75*  --   --  1.61*  --   --  1.56*  --  1.74*   GFRESTIMATED 44*  --   --  48*  --   --  50*  --  44*   SANDRA 10.2  --   --  10.3  --   --  10.8*  --  10.9*   MAG 2.0  --   --  2.1  --   --  2.3  --  2.4*   PHOS 2.7  --   --  2.5 2.5  --  1.8*  --  2.1*   PROTTOTAL 3.9*  --   --  4.2*  --   --  4.3*  --  4.1*   ALBUMIN 2.4*  --   --  2.4*  --   --  2.6*  --  2.6*   BILITOTAL 24.0*  --   --  23.1*  --   --  24.6*  --  25.0*   ALKPHOS 257*  --   --  286*  --   --  273*  --  278*   AST 82*  --   --  88*  --   --  82*  --  83*   *  --   --  152*  --   --  141*  --  142*    < > = values in this interval not displayed.       INRNo lab results found in last 7 days.    Blood CultureNo results for input(s): \"CULT\" in the last 168 hours.      Paul Roberto MD  [facility]  7/9/2025 5:15 PM        "

## 2025-07-09 NOTE — PROGRESS NOTES
[facility]    Medicine Progress Note - Hospitalist Service    Date of Admission:  6/24/2025    Assessment & Plan   Summary of Stay:   Zoltan Dalton is a 60 year old male with PMH of tobacco use who presented with abdominal pain and was admitted on 6/24/2025 admitted for obstructive jaundice with metastatic malignancy hepatobiliary versus pancreatic source.      He does not interact with the healthcare system and does not have a primary care doctor or health insurance.  Here upon arrival he was found to have bilirubin of 33.4 with alkaline phosphatase of 489, AST of 135 and ALT of 140.  Creatinine was 2.24 with white blood count elevated at 14.5.  Abdominal ultrasound showed moderate to severe intrahepatic biliary ductal dilatation as well as common duct dilated to 12 mm and heterogenous liver.  CT showed concern for hepatic masses and pulmonary nodules as well as small ascites.      CEA was elevated at 43.1, CA 19-9 was 31 and AFP was less than 1.8.  Hepatitis panel was negative.  MRCP showed massive intrahepatic ductal dilatation with concern for intrahepatic obstructing cholangiocarcinoma versus pancreatic neoplasm with mets.     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  ERCP repeated on 7/2/25 with stent exchange    We have been discussing goals of care regularly.  Oncology has been following.  Unfortunately Mr. Dalton has not been able to eat or drink reliably and functional status is extremely poor to the point that he would not tolerate any systemic therapy.  After further discussion with the patient, his son and ex-wife he has decided to pursue comfort care status with hospice enrollment.    Ascites does seem to be worsening significantly, planning to place a Pleurx catheter and pursue hospice placement.     Metastatic cholangiocarcinoma now  comfort care status with plan to pursue hospice.  Multiple liver lesions and intrahepatic biliary ductal dilatation s/p stenting 6/26 and repositioning of stent on 7/ 2  Elevated liver transaminase levels, obstructive jaundice    -- Status post MRCP, then on 6/26 ERCP and EUS as above.  -- ERCP repeated 7/2 with stent exchange.    Path returned as adenocarcinoma  -- Oncology consulted and following.  Very poor functional status and felt not to be a candidate for chemotherapy.  -- Comfort care status, pursuing hospice facility placement.    Hypernatremia (poor oral intake in setting of liver failure and third spacing)  PIYUSH with worsening renal functions  -- Improved with hypotonic fluids  --Stopping lab checks etc.    Hypercalcemia  --Stop lab checks.    Failure to thrive  Severe malnutrition in context of acute illness    Leukocytosis : Slowly improving  --CT CAP done and showed moderate bilateral pleural effusions and atelectasis, interlobular septal thickening and groundglass attenuation in lower lobes, suggestive of edema, atelectasis, and/or pneumonitis.   --Suspect leukocytosis is related to malignancy and not acute infection.     Pleural effusions  Possible pulmonary edema  Noted on CT 6/30. No respiratory symptoms, no hypoxia.     Colonic mass, ruled out  Previous colonoscopy '21 unremarkable, though had 4mm tubular adenoma in transverse colon on colonoscopy '15.   Colonoscopy on 6/26 showed a normal colon.      Bilateral pulmonary nodules  Possible metastatic disease vs infectious or inflammatory.      Tobacco use disorder - daily smoker             Diet: Snacks/Supplements Adult: Ensure Plus High Protein; With Meals  Regular Diet Adult    DVT Prophylaxis: Heparin SQ  Maki Catheter: Not present  Lines: None     Cardiac Monitoring: None  Code Status: No CPR- Do NOT Intubate      Clinically Significant Risk Factors        # Hypokalemia: Lowest K = 3.1 mmol/L in last 2 days, will replace as needed  #  Hypernatremia: Highest Na = 156 mmol/L in last 2 days, will monitor as appropriate  # Hyperchloremia: Highest Cl = 122 mmol/L in last 2 days, will monitor as appropriate       # Hypercalcemia: corrected calcium is >10.1, will monitor as appropriate    # Hypoalbuminemia: Lowest albumin = 2.2 g/dL at 6/30/2025  7:12 AM, will monitor as appropriate   # Thrombocytopenia: Lowest platelets = 96 in last 2 days, will monitor for bleeding               # Severe Malnutrition: based on nutrition assessment and treatment provided per dietitian's recommendations.           Disposition Plan     Medically Ready for Discharge: Anticipated Tomorrow pending hospice enrollment             Akash Vazquez MD  Hospitalist Service  [facility]  Securely message with [employer] (more info)  Text page via [facility] Paging/Directory   ______________________________________________________________________    Interval History     Goals of care meetings today, his son Zoltan came to the hospital as well as his ex-wife  Mr. Dalton now plans to pursue hospice enrollment, patient's son is in agreement.  Planning on Pleurx catheter placement as able, IR consulted  Orders updated    Physical Exam   Vital Signs: Temp: 96.8  F (36  C) Temp src: Temporal BP: 100/52 Pulse: 65   Resp: 16 SpO2: 99 % O2 Device: None (Room air)    Weight: 148 lbs 5.91 oz      GEN:  Alert, awake, min conversant  HEENT: Temporal wasting, no scleral icterus, no nasal discharge  CV:  Regular rate and rhythm, no loud murmur or JVD.  S1 + S2 noted  LUNGS:  Clear to auscultation ant/lat bilaterally without rales/rhonchi/wheezing/retractions.  Symmetric chest rise on inhalation noted.  ABD:  + bowel sounds, soft, non-tender to light palpation throughout; mod distended.  No clear rebound/guarding/rigidity.  EXT:  1+ pitting pretibial edema bilaterally.  No cyanosis.    SKIN:  Dry to touch, no exanthems noted in the visualized areas.    Medical Decision  Making       54 MINUTES SPENT BY ME on the date of service doing chart review, history, exam, documentation & further activities per the note.      This total time includes time spent on the phone discussing case and plan of care with oncology and IR this am  Data   Medications   Current Facility-Administered Medications   Medication Dose Route Frequency Provider Last Rate Last Admin     Current Facility-Administered Medications   Medication Dose Route Frequency Provider Last Rate Last Admin    mirtazapine (REMERON) tablet 15 mg  15 mg Oral At Bedtime Jane Rhodes DO   15 mg at 07/08/25 2140    senna-docusate (SENOKOT-S/PERICOLACE) 8.6-50 MG per tablet 1 tablet  1 tablet Oral BID Ham Nuñez APRN CNP   1 tablet at 07/08/25 2140    Or    senna-docusate (SENOKOT-S/PERICOLACE) 8.6-50 MG per tablet 2 tablet  2 tablet Oral BID Ham Nuñez APRN CNP   2 tablet at 07/09/25 0840    sodium chloride (PF) 0.9% PF flush 3 mL  3 mL Intracatheter Q8H [facility] Ham Nuñez APRN CNP   3 mL at 07/03/25 7790

## 2025-07-09 NOTE — PROGRESS NOTES
PALLIATIVE CARE PROGRESS NOTE  [facility]     Patient Name: Zoltan Dalton  Date of Admission: 6/24/2025   Today the patient was seen for: Goals of care     Recommendations & Counseling     GOALS OF CARE:   Comfort focused plan of care with interest in [facility] if bed is available.  Social Work is consulted for hospice dismissal.     Surrogate decision maker previously noted by patient to be his son Zoltan Jarvis. - patient states he would want Zoltan Jarvis to decide, with the medical team, when discontinuation of life-sustaining treatments should be sought.      ADVANCE CARE PLANNING:  No health care directive on file. Per system policy, Surrogate Decision-makers for Patients With Diminished Decision-making Capacity offers guidance on possible decision-makers. Zoltan Dalton Jr. has been identified as a surrogate decision maker.   New POLST completed today.   Code status: No CPR- Do NOT Intubate     MEDICAL MANAGEMENT:   Comfort Care   Below are common symptoms routinely seen in patients with comfort focused goals and at the end of life. This patient may not currently exhibit all of these symptoms; however, if these were to occur our recommendations are as follows:    #Pain and #Air hunger/Dyspnea   Do Not recommend Morphine due to PIYUSH (Creatine Clearance 42.2 mL/min)   Reasonable to offer Roxicodone SL 5-10 mg q 1 hour as needed     #Anxiety    1st choice: Lorazepam PO/SL 1 mg  q 3 hour as needed (do not order IV  Lorazepam as it is in short supply    #Secretion burden   Robinul 0.1 mg (PO/IV) q 4 hours as needed. If ineffective, increase up to 0.3 mg   Consider atropine if Robinul ineffective after dose increase      #Nausea   Zofran 4 mg q 6 hours as needed. Can increase to 8 mg   Zyprexa 5 mg q 6 hours as needed     #Agitation  Aggressive control of other symptoms first, then  1st choice: Zyprexa 5 mg ODT or IM   2nd choice: Increase dose of Zyprexa to 10 mg or consider  "switch to Haldol   3rd choice: Lorazepam as above      PSYCHOSOCIAL/SPIRITUAL SUPPORT:  Family : Spouse, son and there are step-children     Palliative Care will continue to follow. Thank you for the consult and allowing us to aid in the care of Zoltan Dalton.    These recommendations have been discussed with Hospitalist Akash Vazquez MD, bedside nurse ALEX Sterling and  SULEMA Em.    GUILHERME Manriquez CNS  [facility], Palliative Care  Securely message with the [employer]oc[employer] Web Console (learn more here) or  Text page via [facility] Paging/Directory        Assessment          Zoltan Dalton is a 60 year old male with a past medical history of tobacco use who presented on 6/24/25 with multiple days of abdominal pain.  Associated symptoms of early satiety, 40 lb weight loss, epigastric pain/reflux.     Patient does not follow with primary care provider at baseline, no known medical history.       Imaging obtained for pain evaluation, found to have lesions consistent with hepatic malignancy. Liver enzymes elevated, acute kidney injury, colonic mass, intrahepatic biliary ductal dilatation, pulmonary nodules all appreciate on work up.  FNA obtained for tumor work up and oncology consulted.  GI consulted ERCP completed with biliary stenting.  [place]-rectal surgery do not recommend surgical intervention. Oncology discussed with son \"patient will most likely never be a good candidate for palliative systemic therapy for his metastatic cholangiocarcinoma.\"       Today, the patient was seen for:  #Goals of care  #Patient support      Interval History:     Multidisciplinary collaboration:  Appreciate input of bedside nursing staff as patient has been hesitant to take oral medications.     Patient/family narrative  Joined Hospitalist Akash Vazquez MD as he met with Zoltna (who was sitting up in a chair) and his wife, Syphapdimitri \"Liliana\" Sha. Dr. Vazquez explained the current situation Zoltan is faced with " and Liliana appropriately request all medical decisions be made with Zoltan's son (Zoltan Anne) as they have been  for five years (yet legally are ). She acknowledged no one is able to care for the patient at home and hospice will be needed, so I offered the option of [facility], which Liliana agreed.     When the patient's son arrived we discussed the patient's difficult situation. The patient's son acknowledged the conversation he had with Oncologist Paul Roberto MD. We explored the consideration of a comfort approach which Liliana and Zoltan  agree. The family requested to speak with Hospitalist Akash Vazquez MD who arrived shortly thereafter.       Review of Systems:     Besides above, ROS was reviewed and is unremarkable        Physical Exam:   Temp:  [96.8  F (36  C)-98.3  F (36.8  C)] 96.8  F (36  C)  Pulse:  [65-83] 65  Resp:  [16-18] 16  BP: (100-124)/(48-58) 100/52  SpO2:  [96 %-100 %] 99 %  148 lbs 5.91 oz    Physical Exam  GEN:  Alert, oriented to self only, appears uncomfortable and tachypnic.  HEENT:  Normocephalic/atraumatic, no scleral icterus, no nasal discharge, mouth moist.  RESP:  Poor inspiratory effort with respirations at 26.  ABD:  Rounded, soft, non-tender/non-distended.  +BS  PAIN BEHAVIOR: Cooperative  Psych:  Normal affect.  Calm, cooperative, conversant but does not recall recent events.       Data Reviewed:     Recent Results (from the past 24 hours)   Potassium   Result Value Ref Range    Potassium 3.3 (L) 3.4 - 5.3 mmol/L   Sodium   Result Value Ref Range    Sodium 149 (H) 135 - 145 mmol/L   Potassium   Result Value Ref Range    Potassium 3.8 3.4 - 5.3 mmol/L   Sodium   Result Value Ref Range    Sodium 148 (H) 135 - 145 mmol/L   CBC with Platelets & Differential    Narrative    The following orders were created for panel order CBC with Platelets & Differential.  Procedure                               Abnormality         Status                      ---------                               -----------         ------                     CBC with platelets and ...[[id number]]  Abnormal            Final result                 Please view results for these tests on the individual orders.   Comprehensive metabolic panel   Result Value Ref Range    Sodium 148 (H) 135 - 145 mmol/L    Potassium 3.4 3.4 - 5.3 mmol/L    Carbon Dioxide (CO2) 17 (L) 22 - 29 mmol/L    Anion Gap 14 7 - 15 mmol/L    Urea Nitrogen 44.1 (H) 8.0 - 23.0 mg/dL    Creatinine 1.75 (H) 0.67 - 1.17 mg/dL    GFR Estimate 44 (L) >60 mL/min/1.73m2    Calcium 10.2 8.8 - 10.4 mg/dL    Chloride 117 (H) 98 - 107 mmol/L    Glucose 162 (H) 70 - 99 mg/dL    Alkaline Phosphatase 257 (H) 40 - 150 U/L    AST 82 (H) 0 - 45 U/L     (H) 0 - 70 U/L    Protein Total 3.9 (L) 6.4 - 8.3 g/dL    Albumin 2.4 (L) 3.5 - 5.2 g/dL    Bilirubin Total 24.0 (HH) <=1.2 mg/dL   Magnesium   Result Value Ref Range    Magnesium 2.0 1.7 - 2.3 mg/dL   Phosphorus   Result Value Ref Range    Phosphorus 2.7 2.5 - 4.5 mg/dL   CBC with platelets and differential   Result Value Ref Range    WBC Count 14.4 (H) 4.0 - 11.0 10e3/uL    RBC Count 2.72 (L) 4.40 - 5.90 10e6/uL    Hemoglobin 9.6 (L) 13.3 - 17.7 g/dL    Hematocrit 27.6 (L) 40.0 - 53.0 %     (H) 78 - 100 fL    MCH 35.3 (H) 26.5 - 33.0 pg    MCHC 34.8 31.5 - 36.5 g/dL    RDW 26.7 (H) 10.0 - 15.0 %    Platelet Count 96 (L) 150 - 450 10e3/uL    % Neutrophils 87 %    % Lymphocytes 10 %    % Monocytes 2 %    % Eosinophils 0 %    % Basophils 0 %    % Immature Granulocytes 1 %    NRBCs per 100 WBC 1 (H) <1 /100    Absolute Neutrophils 12.5 (H) 1.6 - 8.3 10e3/uL    Absolute Lymphocytes 1.4 0.8 - 5.3 10e3/uL    Absolute Monocytes 0.3 0.0 - 1.3 10e3/uL    Absolute Eosinophils 0.0 0.0 - 0.7 10e3/uL    Absolute Basophils 0.0 0.0 - 0.2 10e3/uL    Absolute Immature Granulocytes 0.1 <=0.4 10e3/uL    Absolute NRBCs 0.1 10e3/uL         MANAGEMENT DISCUSSED with the following over the past  24 hours: Hospitalist Akash Vazquez MD and bedside nurse ALEX Sterling.   100 MINUTES SPENT BY ME on the date of service doing chart review, history, exam, documentation & further activities per the note.

## 2025-07-09 NOTE — PROGRESS NOTES
Pt feels a little better today.      AF-VSS  Na 148 p getting IV D5.  Sitting in chair, NAD  Cachectic    1.  Follow labs.    2.  Continue D5.  3.  Agree c hospice discussion.    I will sign off.  Please call c ?s.  Thank you for this consultation.  D/w Dr. Vazquez.

## 2025-07-09 NOTE — CONSULTS
Interventional Radiology - Progress Note  Inpatient - [facility]  7/9/2025    IR Brief Note    IR consulted for tunneled peritoneal drain placement. Reviewed by Dr Sneed. Will schedule for tomorrow in IR. Patient enrolling in hospice care. Labs and medications reviewed, appropriate for procedure. NPO at MN as ordered.    Hamzah Lei PA-C  Interventional Radiology  [phone] (IR)  [phone] ([facility] Office)

## 2025-07-09 NOTE — PLAN OF CARE
"Pertinent assessments: pt alert, intermittent confusion, on RA, denies any pain. LE edema. Incont of urine x 1    Major Shift Events: pt refused medications     Treatment Plan: IVF, pain management    Problem: Adult Inpatient Plan of Care  Goal: Patient-Specific Goal (Individualized)  Description: You can add care plan individualizations to a care plan. Examples of Individualization might be:  \"Parent requests to be called daily at 9am for status\", \"I have a hard time hearing out of my right ear\", or \"Do not touch me to wake me up as it startles  me\".  Outcome: Not Progressing  Goal: Absence of Hospital-Acquired Illness or Injury  Outcome: Not Progressing  Intervention: Identify and Manage Fall Risk  Recent Flowsheet Documentation  Taken 7/8/2025 2103 by Sveta Abernathy, RN  Safety Promotion/Fall Prevention:   activity supervised   assistive device/personal items within reach   lighting adjusted   nonskid shoes/slippers when out of bed   safety round/check completed  Intervention: Prevent Skin Injury  Recent Flowsheet Documentation  Taken 7/8/2025 2005 by Sveta Abernathy, RN  Body Position: position changed independently  Intervention: Prevent and Manage VTE (Venous Thromboembolism) Risk  Recent Flowsheet Documentation  Taken 7/8/2025 2103 by Sveta Abernathy, RN  VTE Prevention/Management:   SCDs off (sequential compression devices)   patient refused intervention  Goal: Optimal Comfort and Wellbeing  Outcome: Not Progressing  Goal: Readiness for Transition of Care  Outcome: Not Progressing     Problem: Liver Failure  Goal: Optimal Coping with Liver Failure  Outcome: Not Progressing  Goal: Absence of Bleeding  Outcome: Not Progressing  Goal: Fluid and Electrolyte Balance  Outcome: Not Progressing  Goal: Optimal Gastrointestinal Function  Outcome: Not Progressing  Goal: Blood Glucose Level Within Target Range  Outcome: Not Progressing  Goal: Hemodynamic Stability  Outcome: Not Progressing  Goal: Absence of Infection " Signs and Symptoms  Outcome: Not Progressing  Goal: Optimal Neurologic Function  Outcome: Not Progressing  Intervention: Monitor and Optimize Neurologic Status  Recent Flowsheet Documentation  Taken 7/8/2025 2103 by Sveta Abernathy RN  Head of Bed (HOB) Positioning: HOB lowered  Taken 7/8/2025 2005 by Sveta Abernathy RN  Head of Bed (HOB) Positioning: HOB at 20-30 degrees  Goal: Improved Oral Intake  Outcome: Not Progressing  Goal: Optimal Pain Control, Comfort and Function  Outcome: Not Progressing  Goal: Effective Oxygenation and Ventilation  Outcome: Not Progressing  Intervention: Promote Airway Secretion Clearance  Recent Flowsheet Documentation  Taken 7/9/2025 0538 by Sveta Abernathy RN  Activity Management: ambulated to bathroom  Taken 7/8/2025 2103 by Sveta Abernathy RN  Cough And Deep Breathing: done with encouragement  Activity Management: back to bed  Taken 7/8/2025 2054 by Sveta Abernathy RN  Activity Management: ambulated to bathroom  Taken 7/8/2025 2005 by Sveta Abernathy RN  Activity Management: activity adjusted per tolerance  Intervention: Optimize Oxygenation and Ventilation  Recent Flowsheet Documentation  Taken 7/8/2025 2103 by Sveta Abernathy, RN  Head of Bed (HOB) Positioning: HOB lowered  Taken 7/8/2025 2005 by Sveta Abernathy RN  Head of Bed (HOB) Positioning: HOB at 20-30 degrees   Goal Outcome Evaluation:

## 2025-07-09 NOTE — CONSULTS
SPIRITUAL HEALTH SERVICES - Consult Note  [facility] Med/Surg 5    Referral Source: SHS consult; Palliative Care Nurse Marie Loza requested  support for pt's family.    Called pt's son Zoltan and introduced SHS.  Zoltan denied having any needs at this time.  He shared that he is Spiritism, but his father is more spiritual than Anglican.      Plan: Informed pt's son how he can request  support, in case his needs change.  This author and other chaplains remain available per pt/family request.     David Garcia M.Div., [facility]  Staff     [facility]S available 24/7 for emergent requests/referrals, either by paging the on-call  or by entering an ASAP/STAT consult in [employer], which will also page the on-call .

## 2025-07-10 VITALS
BODY MASS INDEX: 22.49 KG/M2 | HEIGHT: 68 IN | OXYGEN SATURATION: 95 % | WEIGHT: 148.37 LBS | DIASTOLIC BLOOD PRESSURE: 52 MMHG | SYSTOLIC BLOOD PRESSURE: 100 MMHG | TEMPERATURE: 96.8 F | HEART RATE: 65 BPM | RESPIRATION RATE: 16 BRPM

## 2025-07-10 PROCEDURE — 99238 HOSP IP/OBS DSCHRG MGMT 30/<: CPT | Performed by: INTERNAL MEDICINE

## 2025-07-10 PROCEDURE — 250N000013 HC RX MED GY IP 250 OP 250 PS 637: Performed by: CLINICAL NURSE SPECIALIST

## 2025-07-10 PROCEDURE — 250N000011 HC RX IP 250 OP 636: Performed by: NURSE PRACTITIONER

## 2025-07-10 PROCEDURE — 250N000011 HC RX IP 250 OP 636: Performed by: CLINICAL NURSE SPECIALIST

## 2025-07-10 RX ADMIN — GLYCOPYRROLATE 0.2 MG: 0.2 INJECTION, SOLUTION INTRAMUSCULAR; INTRAVENOUS at 02:18

## 2025-07-10 RX ADMIN — HYDROMORPHONE HYDROCHLORIDE 0.4 MG: 0.2 INJECTION, SOLUTION INTRAMUSCULAR; INTRAVENOUS; SUBCUTANEOUS at 00:58

## 2025-07-10 RX ADMIN — HALOPERIDOL 2 MG: 2 SOLUTION ORAL at 02:16

## 2025-07-10 ASSESSMENT — ACTIVITIES OF DAILY LIVING (ADL)
ADLS_ACUITY_SCORE: 55
ADLS_ACUITY_SCORE: 59
ADLS_ACUITY_SCORE: 59
ADLS_ACUITY_SCORE: 55
ADLS_ACUITY_SCORE: 59
ADLS_ACUITY_SCORE: 55
ADLS_ACUITY_SCORE: 59
ADLS_ACUITY_SCORE: 55
ADLS_ACUITY_SCORE: 54
ADLS_ACUITY_SCORE: 59
ADLS_ACUITY_SCORE: 54

## 2025-07-10 NOTE — DISCHARGE SUMMARY
[facility]  Discharge Summary  Name: Zoltan Dalton    MRN: [id number]  YOB: 1965    Age: 60 year old  Date of Discharge:  7/10/2025  Date of Admission: 6/24/2025  Primary Care Provider: No Ref-Primary, Physician  Discharge Physician:  Carlo Vazquez MD  Discharging Service:  Hospitalist      Hospital Course/Discharge Diagnoses:  Zoltan Dalton is a 60 year old male with PMH of tobacco use who presented with abdominal pain and was admitted on 6/24/2025 admitted for obstructive jaundice with metastatic malignancy hepatobiliary versus pancreatic source.       He does not interact with the healthcare system and does not have a primary care doctor or health insurance.  Here upon arrival he was found to have bilirubin of 33.4 with alkaline phosphatase of 489, AST of 135 and ALT of 140.  Creatinine was 2.24 with white blood count elevated at 14.5.  Abdominal ultrasound showed moderate to severe intrahepatic biliary ductal dilatation as well as common duct dilated to 12 mm and heterogenous liver.  CT showed concern for hepatic masses and pulmonary nodules as well as small ascites.      CEA was elevated at 43.1, CA 19-9 was 31 and AFP was less than 1.8.  Hepatitis panel was negative.  MRCP showed massive intrahepatic ductal dilatation with concern for intrahepatic obstructing cholangiocarcinoma versus pancreatic neoplasm with mets.     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  ERCP repeated on 7/2/25 with stent exchange     We have been discussing goals of care regularly.  Oncology has been following.  Unfortunately Mr. Dalton has not been able to eat or drink reliably and functional status is extremely poor to the point that he would not tolerate any systemic therapy.  After further discussion with the patient, his son and ex-wife he has  decided to pursue comfort care status.     Over the past 24 hours he decompensated significantly and passed away with family present on 7/10/2025.    Preliminary cause of death metastatic cholangiocarcinoma.    Metastatic cholangiocarcinoma now comfort care status   Multiple liver lesions and intrahepatic biliary ductal dilatation s/p stenting  and repositioning of stent on   Elevated liver transaminase levels, obstructive jaundice     Hypernatremia (poor oral intake in setting of liver failure and third spacing)  PIYUSH with worsening renal functions     Hypercalcemia       Failure to thrive  Severe malnutrition in context of acute illness     Leukocytosis :  --CT CAP done and showed moderate bilateral pleural effusions and atelectasis, interlobular septal thickening and groundglass attenuation in lower lobes, suggestive of edema, atelectasis, and/or pneumonitis.   --Suspect leukocytosis is related to malignancy and not acute infection.     Pleural effusions  Possible pulmonary edema     Colonic mass, ruled out  Previous colonoscopy ' unremarkable, though had 4mm tubular adenoma in transverse colon on colonoscopy '15.   Colonoscopy on  showed a normal colon.      Bilateral pulmonary nodules  Possible metastatic disease vs infectious or inflammatory.      Tobacco use disorder - daily smoker        Discharge Disposition:  Discharged to [facility]     Allergies:  No Known Allergies     Discharge Medications:        Review of your medicines      You have not been prescribed any medications.         Condition on Discharge:  Discharge condition:        Code status on discharge: Comfort Care     History of Illness:  See detailed admission note for full details.    Physical Exam:  See death pronouncement  Total time spent in face to face contact with the patient and coordinating discharge was:  50 Minutes.

## 2025-07-10 NOTE — PLAN OF CARE
Physical Therapy Discharge Summary    Reason for therapy discharge:    Change in medical status. Transitioned to comfort cares.    Progress towards therapy goal(s). See goals on Care Plan in [facility] electronic health record for goal details.  Goals not met.  Barriers to achieving goals:   limited tolerance for therapy.    Therapy recommendation(s):    No further therapy is recommended.

## 2025-07-10 NOTE — DEATH PRONOUNCEMENT
MD DEATH PRONOUNCEMENT    Called to pronounce Zoltan Dalton dead.    Physical Exam: Unresponsive to noxious stimuli, Spontaneous respirations absent, Breath sounds absent, Carotid pulse absent, Heart sounds absent, and Pupillary light reflex absent    Patient was pronounced dead at 12:00 PM, July 10, 2025.    Preliminary Cause of Death: Metastatic Adenocarcnoma

## 2025-07-10 NOTE — PLAN OF CARE
"Cared for the Pt from 2300 to 0700. Pt Alert to self. A2. Cont comfort cares. PIV infusing Nacl at 10ml/hr TKO. Pt was restless and holding a fist to his chest and removing the oxygen mask off. PRN haldol given and IV dilaudid. Pt was breathing heavily and struggling to clear his secretions, PRN Robinul given. At the start of my care Pt was on 15L of O2 on a oxy mask. Currently at 4L NC, continuous Pulse oximeter. NPO@MN. Plan for IR to place a Pleurx catheter today per MD notes.  Pt fall asleep around 5AM and breathing improved, pulse oximeter still on.   Addendum:  Pt declining around 07AM and breathing heavily. Temporal pulse oximeter in place. TKO was stopped and family was notified of the Pt current stage.      Problem: Adult Inpatient Plan of Care  Goal: Patient-Specific Goal (Individualized)  Description: You can add care plan individualizations to a care plan. Examples of Individualization might be:  \"Parent requests to be called daily at 9am for status\", \"I have a hard time hearing out of my right ear\", or \"Do not touch me to wake me up as it startles  me\".  7/10/2025 0353 by Anastasia Randall RN  Outcome: Progressing  7/10/2025 0353 by Anastasia Randall RN  Outcome: Progressing  Flowsheets (Taken 7/10/2025 0353)  Patient/Family-Specific Goals (Include Timeframe): Plan for IR today.  Goal: Absence of Hospital-Acquired Illness or Injury  7/10/2025 0353 by Anastasia Randall RN  Outcome: Progressing  7/10/2025 0353 by Anastasia Randall RN  Outcome: Progressing  Intervention: Identify and Manage Fall Risk  Recent Flowsheet Documentation  Taken 7/10/2025 0100 by Anastasia Randall RN  Safety Promotion/Fall Prevention:   activity supervised   supervised activity   safety round/check completed  Goal: Optimal Comfort and Wellbeing  7/10/2025 0353 by Anastasia Randall RN  Outcome: Progressing  7/10/2025 0353 by Anastasia Randall RN  Outcome: Progressing  Goal: Readiness for Transition of Care  7/10/2025 0353 " by Anastasia Randall RN  Outcome: Progressing  7/10/2025 0353 by Anastasia Randall RN  Outcome: Progressing  Goal: Plan of Care Review  Description: The Plan of Care Review/Shift note should be completed every shift.  The Outcome Evaluation is a brief statement about your assessment that the patient is improving, declining, or no change.  This information will be displayed automatically on your shift  note.  7/10/2025 0353 by Anastasia Randall RN  Outcome: Progressing  7/10/2025 0353 by Anastasia Randall RN  Outcome: Progressing     Problem: Liver Failure  Goal: Optimal Coping with Liver Failure  7/10/2025 0353 by Anastasia Randall RN  Outcome: Progressing  7/10/2025 0353 by Anastasia Randall RN  Outcome: Progressing  Goal: Absence of Bleeding  7/10/2025 0353 by Anastasia Randall RN  Outcome: Progressing  7/10/2025 0353 by Anastasia Randall RN  Outcome: Progressing  Goal: Fluid and Electrolyte Balance  7/10/2025 0353 by Anastasia Randall RN  Outcome: Progressing  7/10/2025 0353 by Anastasia Randall RN  Outcome: Progressing  Goal: Optimal Gastrointestinal Function  7/10/2025 0353 by Anastasia Randall RN  Outcome: Progressing  7/10/2025 0353 by Anastasia Randall RN  Outcome: Progressing  Goal: Blood Glucose Level Within Target Range  7/10/2025 0353 by Anastasia Randall RN  Outcome: Progressing  7/10/2025 0353 by Anastasia Randall RN  Outcome: Progressing  Goal: Hemodynamic Stability  7/10/2025 0353 by Anastasia Randall RN  Outcome: Progressing  7/10/2025 0353 by Anastasia Randall RN  Outcome: Progressing  Goal: Absence of Infection Signs and Symptoms  7/10/2025 0353 by Anastasia Randall RN  Outcome: Progressing  7/10/2025 0353 by Anastasia Randall RN  Outcome: Progressing  Goal: Optimal Neurologic Function  7/10/2025 0353 by Anastasia Randall RN  Outcome: Progressing  7/10/2025 0353 by Anastasia Randall, RN  Outcome: Progressing  Goal: Improved Oral Intake  7/10/2025 0353 by Anastasia Randall,  RN  Outcome: Progressing  7/10/2025 0353 by Anastasia Randall RN  Outcome: Progressing  Goal: Optimal Pain Control, Comfort and Function  7/10/2025 0353 by Anastasia Randall RN  Outcome: Progressing  7/10/2025 0353 by Anastasia Randall RN  Outcome: Progressing  Goal: Effective Oxygenation and Ventilation  7/10/2025 0353 by Anastasia Randall RN  Outcome: Progressing  7/10/2025 0353 by Anastasia Randall RN  Outcome: Progressing

## 2025-07-10 NOTE — PROGRESS NOTES
SPIRITUAL HEALTH SERVICES Progress Note  MS 5    Responded to patient's room regarding on-call Vocera message for family support following patient's death.    Seven family members were gathered around patient, including significant other and adult son.    Family stated that he  of cancer.    Significant other is Yazidi.  She asked for a prayer of commendation and blessing.    I gathered family around patient; then prayed to Our Lord Jabier, Our Mother Lida and [employer] the [employer]; offering gratitude for patient's life and legacy.    Thanks Be to God for the life of Zoltan Dalton. May Eternal Light shine on him and may he Rest in Peace.    Rev. Kristina Mclean M.Div.  Staff   Phone  [phone]

## 2025-07-10 NOTE — PROGRESS NOTES
CLINICAL NUTRITION SERVICES    Informed decision made to change pt s status to comfort care. Nutrition interventions discontinued and no further interventions planned at this time. RD can be consulted if needed.    RD signing off on 7/10/2025.

## 2025-07-10 NOTE — PLAN OF CARE
"Goal Outcome Evaluation:         Pt is A&Oself. Comfort cares. Repositions for comfort. VSS on RA. No SOB. Denies nausea. Assist x2 w/g. Incontinent of B&B at times. PIV R forearm running 10 mL/hr. Tolerating PO. Pain controlled with scheduled regimen. CMS: intact. Discharge plans TBD         Problem: Adult Inpatient Plan of Care  Goal: Patient-Specific Goal (Individualized)  Description: You can add care plan individualizations to a care plan. Examples of Individualization might be:  \"Parent requests to be called daily at 9am for status\", \"I have a hard time hearing out of my right ear\", or \"Do not touch me to wake me up as it startles  me\".  7/9/2025 2219 by Bassem Pepper, RN  Outcome: Progressing  Flowsheets (Taken 7/9/2025 2219)  Patient/Family-Specific Goals (Include Timeframe): Pt is A&Ox4. VSS on RA. LS Clear. No SOB. Denies nausea. Bowel sounds normoactive. Assist x1 w/g. Voiding via external catheter. Bladder scan: XXXmL. LBM: X/XX. IV abx. PIV R forearm SL. Tolerating PO. Glucose checks. K, Mg, Phos protocols, no replacements w/recheck in the morning. Pain controlled with scheduled regimen and PRN. CMS: intact. Weightbearing as tolerated. R or L drsg CDI. Tele: XX HR: XX. Discharge plans to TCU, time&date TBD  7/9/2025 2219 by Bassem Pepper, RN  Outcome: Progressing  Flowsheets (Taken 7/9/2025 2219)  Patient/Family-Specific Goals (Include Timeframe): Pt is A&Ox4. VSS on RA. LS Clear. No SOB. Denies nausea. Bowel sounds normoactive. Assist x1 w/g. Voiding via external catheter. Bladder scan: XXXmL. LBM: X/XX. IV abx. PIV R forearm SL. Tolerating PO. Glucose checks. K, Mg, Phos protocols, no replacements w/recheck in the morning. Pain controlled with scheduled regimen and PRN. CMS: intact. Weightbearing as tolerated. R or L drsg CDI. Tele: XX HR: XX. Discharge plans to TCU, time&date TBD  7/9/2025 2218 by Bassem Pepper, RN  Outcome: Progressing  7/9/2025 2218 by Bassem Pepper, " RN  Outcome: Progressing  Goal: Absence of Hospital-Acquired Illness or Injury  7/9/2025 2219 by Bassem Pepper RN  Outcome: Progressing  7/9/2025 2219 by Bassem Pepper RN  Outcome: Progressing  7/9/2025 2218 by Bassem Pepper RN  Outcome: Progressing  7/9/2025 2218 by Bassem Pepper RN  Outcome: Progressing  Intervention: Identify and Manage Fall Risk  Recent Flowsheet Documentation  Taken 7/9/2025 2145 by Bassem Pepper RN  Safety Promotion/Fall Prevention:   activity supervised   assistive device/personal items within reach   lighting adjusted   nonskid shoes/slippers when out of bed   safety round/check completed  Intervention: Prevent Skin Injury  Recent Flowsheet Documentation  Taken 7/9/2025 2145 by Bassem Pepper RN  Body Position:   turned   left  Skin Protection: adhesive use limited  Intervention: Prevent and Manage VTE (Venous Thromboembolism) Risk  Recent Flowsheet Documentation  Taken 7/9/2025 2145 by Bassem Pepper RN  VTE Prevention/Management:   SCDs off (sequential compression devices)   patient refused intervention  Intervention: Prevent Infection  Recent Flowsheet Documentation  Taken 7/9/2025 2145 by Bassem Pepper RN  Infection Prevention:   rest/sleep promoted   hand hygiene promoted  Goal: Optimal Comfort and Wellbeing  7/9/2025 2219 by Bassem Pepper RN  Outcome: Progressing  7/9/2025 2219 by Bassem Pepper RN  Outcome: Progressing  7/9/2025 2218 by Bassem Pepper RN  Outcome: Progressing  7/9/2025 2218 by Bassem Pepper RN  Outcome: Progressing  Intervention: Monitor Pain and Promote Comfort  Recent Flowsheet Documentation  Taken 7/9/2025 2145 by Bassem Pepper RN  Pain Management Interventions: medication offered but refused  Goal: Readiness for Transition of Care  7/9/2025 2219 by Bassem Pepper RN  Outcome: Progressing  7/9/2025 2219 by Bassem Pepper RN  Outcome: Progressing  7/9/2025 2218 by Bassem Pepper  RN  Outcome: Progressing  7/9/2025 2218 by Bassem Pepper RN  Outcome: Progressing  Goal: Plan of Care Review  Description: The Plan of Care Review/Shift note should be completed every shift.  The Outcome Evaluation is a brief statement about your assessment that the patient is improving, declining, or no change.  This information will be displayed automatically on your shift  note.  7/9/2025 2219 by Bassem Pepper RN  Outcome: Progressing  7/9/2025 2219 by Bassem Pepper RN  Outcome: Progressing     Problem: Liver Failure  Goal: Optimal Coping with Liver Failure  7/9/2025 2219 by Bassem Pepper RN  Outcome: Progressing  7/9/2025 2219 by Bassem Pepper RN  Outcome: Progressing  7/9/2025 2218 by Bassem Pepper RN  Outcome: Progressing  7/9/2025 2218 by Bassem Pepper RN  Outcome: Progressing  Goal: Absence of Bleeding  7/9/2025 2219 by Bassem Pepper RN  Outcome: Progressing  7/9/2025 2219 by Bassem Pepper RN  Outcome: Progressing  7/9/2025 2218 by Bassem Pepper RN  Outcome: Progressing  7/9/2025 2218 by Bassem Pepper RN  Outcome: Progressing  Goal: Fluid and Electrolyte Balance  7/9/2025 2219 by Bassem Pepper RN  Outcome: Progressing  7/9/2025 2219 by Bassem Pepper RN  Outcome: Progressing  7/9/2025 2218 by Bassem Pepper RN  Outcome: Progressing  7/9/2025 2218 by Bassem Pepper RN  Outcome: Progressing  Goal: Optimal Gastrointestinal Function  7/9/2025 2219 by Bassem Pepper RN  Outcome: Progressing  7/9/2025 2219 by Bassem Pepper RN  Outcome: Progressing  7/9/2025 2218 by Bassem Pepper RN  Outcome: Progressing  7/9/2025 2218 by Bassem Pepper RN  Outcome: Progressing  Goal: Blood Glucose Level Within Target Range  7/9/2025 2219 by Bassem Pepper RN  Outcome: Progressing  7/9/2025 2219 by Bassem Pepper, RN  Outcome: Progressing  7/9/2025 2218 by Bassem Pepper RN  Outcome: Progressing  7/9/2025 2218  by Bassem Pepper RN  Outcome: Progressing  Goal: Hemodynamic Stability  7/9/2025 2219 by Bassem Pepper RN  Outcome: Progressing  7/9/2025 2219 by Bassem Pepper RN  Outcome: Progressing  7/9/2025 2218 by Bassem Pepper RN  Outcome: Progressing  7/9/2025 2218 by Bassem Pepper, RN  Outcome: Progressing  Goal: Absence of Infection Signs and Symptoms  7/9/2025 2219 by Bassem Pepper RN  Outcome: Progressing  7/9/2025 2219 by Bassem Pepper RN  Outcome: Progressing  7/9/2025 2218 by Bassem Pepper RN  Outcome: Progressing  7/9/2025 2218 by Bassem Pepper RN  Outcome: Progressing  Goal: Optimal Neurologic Function  7/9/2025 2219 by Bassem Pepper, RN  Outcome: Progressing  7/9/2025 2219 by Bassem Pepper RN  Outcome: Progressing  7/9/2025 2218 by Bassem Pepper RN  Outcome: Progressing  7/9/2025 2218 by Bassem Pepper RN  Outcome: Progressing  Intervention: Monitor and Optimize Neurologic Status  Recent Flowsheet Documentation  Taken 7/9/2025 2145 by Bassem Pepper RN  Head of Bed (HOB) Positioning: HOB lowered  Goal: Improved Oral Intake  7/9/2025 2219 by Bassem Pepper, RN  Outcome: Progressing  7/9/2025 2219 by Bassem Pepper RN  Outcome: Progressing  7/9/2025 2218 by Bassem Pepper RN  Outcome: Progressing  7/9/2025 2218 by Bassem Pepper, RN  Outcome: Progressing  Goal: Optimal Pain Control, Comfort and Function  7/9/2025 2219 by Bassem Pepper RN  Outcome: Progressing  7/9/2025 2219 by Bassem Pepper RN  Outcome: Progressing  7/9/2025 2218 by Bassem Pepper, RN  Outcome: Progressing  7/9/2025 2218 by Maldonado King, Bassem, RN  Outcome: Progressing  Intervention: Prevent or Manage Pain  Recent Flowsheet Documentation  Taken 7/9/2025 2145 by Bassem Pepper, RN  Pain Management Interventions: medication offered but refused  Goal: Effective Oxygenation and Ventilation  7/9/2025 2219 by Bassem Pepper, RN  Outcome:  Progressing  7/9/2025 2219 by Bassem Pepper, RN  Outcome: Progressing  7/9/2025 2218 by Bassem Pepper, RN  Outcome: Progressing  7/9/2025 2218 by Bassem Pepper, RN  Outcome: Progressing  Intervention: Promote Airway Secretion Clearance  Recent Flowsheet Documentation  Taken 7/9/2025 2145 by Bassem Pepper, RN  Cough And Deep Breathing: done with encouragement  Activity Management:   activity adjusted per tolerance   activity encouraged  Intervention: Optimize Oxygenation and Ventilation  Recent Flowsheet Documentation  Taken 7/9/2025 2145 by Bassem Pepper, RN  Head of Bed (HOB) Positioning: HOB lowered

## 2025-07-10 NOTE — PROGRESS NOTES
Pt oxygen stats drop from 95% on RA to low 70s, then up to 85-89% on 15L This event happened after vomiting blood. Pt is in comfort cares. MD is aware and contacting family.

## 2025-07-10 NOTE — PROGRESS NOTES
1200 Pt w/o breath or heart beat for 2 minutes.  Notified provider.  Family at bedside.  Hallie Valentine RN

## (undated) DEVICE — WIRE GUIDE 0.035"X450CM JAGWIRE HP STR TIP M00556580

## (undated) DEVICE — Device

## (undated) DEVICE — BAG CLEAR TRASH 1.3M 39X33" P4040C

## (undated) DEVICE — TUBING SUCTION 12"X1/4" N612

## (undated) DEVICE — LINEN FULL SHEET 5511

## (undated) DEVICE — KIT ENDO TURNOVER/PROCEDURE W/CLEAN A SCOPE LINERS 103888

## (undated) DEVICE — KIT PROCEDURE W/CLEAN-A-SCOPE LINERS V2 200800

## (undated) DEVICE — WIRE GUIDE 0.035"X260CM DREAMWIRE M00556100

## (undated) DEVICE — SPHINCTEROTOME 7FRX25MM TRITOME G22555

## (undated) DEVICE — ENDO CATH BALLOON EXTRACTOR PRO RX 09-12MM 4700

## (undated) DEVICE — SOLUTION IV 0.9% NACL 250ML L8002

## (undated) DEVICE — LINEN HALF SHEET 5512

## (undated) DEVICE — SUCTION MANIFOLD NEPTUNE 2 SYS 4 PORT 0702-020-000

## (undated) DEVICE — STENT GEENEN PANCREA SOF-FLEX 5FRX3CM G49668 GPSOS-SF-5-3: Type: IMPLANTABLE DEVICE | Site: PANCREATIC DUCT | Status: NON-FUNCTIONAL

## (undated) DEVICE — ENDO PROBE COVER ULTRASOUND BALLOON LATEX  MAJ-249

## (undated) DEVICE — SOLUTION WATER 1000ML BOTTLE R5000-01

## (undated) DEVICE — CATH BALLOON DILATING BIL FUSION TITAN 12FRX6MM FS-BDB-6X4

## (undated) DEVICE — ESU GROUND PAD ADULT W/CORD E7507

## (undated) DEVICE — TUBING SUCTION MEDI-VAC SOFT 3/16"X20' N520A

## (undated) DEVICE — COTTON-LEUNG BILIARY STENT
Type: IMPLANTABLE DEVICE | Site: BILE DUCT | Status: NON-FUNCTIONAL
Brand: COTTON-LEUNG

## (undated) DEVICE — ENDO NDL ASPIRATION ULTRASOUND 25GA ACQUIRE M00555660

## (undated) DEVICE — SOL WATER IRRIG 500ML BOTTLE 2F7113

## (undated) DEVICE — CONTRAST ISOVUE 300 61% IOPAMIDOL 10X30ML VIAL 131525

## (undated) DEVICE — GOWN XLG DISP 9545

## (undated) DEVICE — PACK ERCP CUSTOM RIDGES

## (undated) DEVICE — INFLATION DEVICE BIG 60 ENDO-AN6012

## (undated) DEVICE — UNDERPAD 36X30 PREMIERPRO MAX ABS NS LF 676111

## (undated) DEVICE — INTR ENDOSCOPIC STENT FUSION OASIS 09.0FRX200CM

## (undated) DEVICE — ENDO ENDO DISTAL MAJ-2315

## (undated) RX ORDER — FENTANYL CITRATE 50 UG/ML
INJECTION, SOLUTION INTRAMUSCULAR; INTRAVENOUS
Status: DISPENSED
Start: 2025-06-26

## (undated) RX ORDER — ONDANSETRON 2 MG/ML
INJECTION INTRAMUSCULAR; INTRAVENOUS
Status: DISPENSED
Start: 2025-07-02

## (undated) RX ORDER — FENTANYL CITRATE 50 UG/ML
INJECTION, SOLUTION INTRAMUSCULAR; INTRAVENOUS
Status: DISPENSED
Start: 2021-04-06

## (undated) RX ORDER — PROPOFOL 10 MG/ML
INJECTION, EMULSION INTRAVENOUS
Status: DISPENSED
Start: 2025-07-02

## (undated) RX ORDER — LIDOCAINE HYDROCHLORIDE 10 MG/ML
INJECTION, SOLUTION EPIDURAL; INFILTRATION; INTRACAUDAL; PERINEURAL
Status: DISPENSED
Start: 2025-07-02

## (undated) RX ORDER — ONDANSETRON 2 MG/ML
INJECTION INTRAMUSCULAR; INTRAVENOUS
Status: DISPENSED
Start: 2025-06-26

## (undated) RX ORDER — LIDOCAINE HYDROCHLORIDE 10 MG/ML
INJECTION, SOLUTION EPIDURAL; INFILTRATION; INTRACAUDAL; PERINEURAL
Status: DISPENSED
Start: 2025-06-26

## (undated) RX ORDER — ACETAMINOPHEN 325 MG/1
TABLET ORAL
Status: DISPENSED
Start: 2025-06-26

## (undated) RX ORDER — GLYCOPYRROLATE 0.2 MG/ML
INJECTION, SOLUTION INTRAMUSCULAR; INTRAVENOUS
Status: DISPENSED
Start: 2025-07-02

## (undated) RX ORDER — FENTANYL CITRATE-0.9 % NACL/PF 10 MCG/ML
PLASTIC BAG, INJECTION (ML) INTRAVENOUS
Status: DISPENSED
Start: 2025-06-26

## (undated) RX ORDER — VASOPRESSIN 20 [USP'U]/ML
INJECTION, SOLUTION INTRAVENOUS
Status: DISPENSED
Start: 2025-06-26